# Patient Record
Sex: MALE | Race: WHITE | Employment: OTHER | URBAN - METROPOLITAN AREA
[De-identification: names, ages, dates, MRNs, and addresses within clinical notes are randomized per-mention and may not be internally consistent; named-entity substitution may affect disease eponyms.]

---

## 2017-05-08 ENCOUNTER — OFFICE VISIT (OUTPATIENT)
Dept: FAMILY MEDICINE CLINIC | Age: 67
End: 2017-05-08
Payer: MEDICARE

## 2017-05-08 ENCOUNTER — HOSPITAL ENCOUNTER (OUTPATIENT)
Age: 67
Setting detail: SPECIMEN
Discharge: HOME | End: 2017-05-08
Payer: MEDICARE

## 2017-05-08 VITALS
HEART RATE: 85 BPM | DIASTOLIC BLOOD PRESSURE: 82 MMHG | TEMPERATURE: 98.1 F | BODY MASS INDEX: 31.95 KG/M2 | SYSTOLIC BLOOD PRESSURE: 141 MMHG | WEIGHT: 192 LBS

## 2017-05-08 DIAGNOSIS — Z23 NEED FOR PNEUMOCOCCAL VACCINATION: ICD-10-CM

## 2017-05-08 DIAGNOSIS — E61.1 IRON DEFICIENCY: ICD-10-CM

## 2017-05-08 DIAGNOSIS — I10 ESSENTIAL HYPERTENSION: ICD-10-CM

## 2017-05-08 DIAGNOSIS — Z00.00 WELL ADULT EXAM: ICD-10-CM

## 2017-05-08 DIAGNOSIS — E03.9 HYPOTHYROIDISM, UNSPECIFIED TYPE: ICD-10-CM

## 2017-05-08 DIAGNOSIS — I10 ESSENTIAL HYPERTENSION: Primary | ICD-10-CM

## 2017-05-08 LAB
-: NORMAL
ABSOLUTE EOS #: 0.03 K/UL (ref 0–0.4)
ABSOLUTE LYMPH #: 1.56 K/UL (ref 1–4.8)
ABSOLUTE MONO #: 0.81 K/UL (ref 0.1–0.8)
ALBUMIN SERPL-MCNC: 3.8 G/DL (ref 3.5–5.2)
ALBUMIN/GLOBULIN RATIO: 1 (ref 1–2.5)
ALP BLD-CCNC: 88 U/L (ref 40–129)
ALT SERPL-CCNC: 13 U/L (ref 5–41)
AMORPHOUS: NORMAL
ANION GAP SERPL CALCULATED.3IONS-SCNC: 14 MMOL/L (ref 9–17)
AST SERPL-CCNC: 15 U/L
BACTERIA: NORMAL
BASOPHILS # BLD: 0 %
BASOPHILS ABSOLUTE: 0 K/UL (ref 0–0.2)
BILIRUB SERPL-MCNC: 0.49 MG/DL (ref 0.3–1.2)
BILIRUBIN URINE: NEGATIVE
BUN BLDV-MCNC: 16 MG/DL (ref 8–23)
BUN/CREAT BLD: ABNORMAL (ref 9–20)
CALCIUM SERPL-MCNC: 9.1 MG/DL (ref 8.6–10.4)
CASTS UA: NORMAL /LPF (ref 0–2)
CHLORIDE BLD-SCNC: 97 MMOL/L (ref 98–107)
CHOLESTEROL/HDL RATIO: 3.4
CHOLESTEROL: 128 MG/DL
CO2: 26 MMOL/L (ref 20–31)
COLOR: YELLOW
COMMENT UA: ABNORMAL
CREAT SERPL-MCNC: 0.74 MG/DL (ref 0.7–1.2)
CRYSTALS, UA: NORMAL /HPF
DIFFERENTIAL TYPE: ABNORMAL
EOSINOPHILS RELATIVE PERCENT: 1 %
EPITHELIAL CELLS UA: NORMAL /HPF (ref 0–5)
GFR AFRICAN AMERICAN: >60 ML/MIN
GFR NON-AFRICAN AMERICAN: >60 ML/MIN
GFR SERPL CREATININE-BSD FRML MDRD: ABNORMAL ML/MIN/{1.73_M2}
GFR SERPL CREATININE-BSD FRML MDRD: ABNORMAL ML/MIN/{1.73_M2}
GLUCOSE BLD-MCNC: 163 MG/DL (ref 70–99)
GLUCOSE URINE: NEGATIVE
HCT VFR BLD CALC: 37.5 % (ref 41–53)
HDLC SERPL-MCNC: 38 MG/DL
HEMOGLOBIN: 13 G/DL (ref 13.5–17.5)
KETONES, URINE: NEGATIVE
LDL CHOLESTEROL: 65 MG/DL (ref 0–130)
LEUKOCYTE ESTERASE, URINE: NEGATIVE
LYMPHOCYTES # BLD: 52 %
MCH RBC QN AUTO: 27.6 PG (ref 26–34)
MCHC RBC AUTO-ENTMCNC: 34.7 G/DL (ref 31–37)
MCV RBC AUTO: 79.5 FL (ref 80–100)
MONOCYTES # BLD: 27 %
MORPHOLOGY: ABNORMAL
MUCUS: NORMAL
NITRITE, URINE: NEGATIVE
OTHER OBSERVATIONS UA: NORMAL
PDW BLD-RTO: 17.1 % (ref 12.5–15.4)
PH UA: 7 (ref 5–8)
PLATELET # BLD: 169 K/UL (ref 140–450)
PLATELET ESTIMATE: ABNORMAL
PMV BLD AUTO: 9.2 FL (ref 6–12)
POTASSIUM SERPL-SCNC: 4.1 MMOL/L (ref 3.7–5.3)
PROSTATE SPECIFIC ANTIGEN: 1.37 UG/L
PROTEIN UA: ABNORMAL
RBC # BLD: 4.71 M/UL (ref 4.5–5.9)
RBC # BLD: ABNORMAL 10*6/UL
RBC UA: NORMAL /HPF (ref 0–2)
RENAL EPITHELIAL, UA: NORMAL /HPF
SEG NEUTROPHILS: 20 %
SEGMENTED NEUTROPHILS ABSOLUTE COUNT: 0.6 K/UL (ref 1.8–7.7)
SODIUM BLD-SCNC: 137 MMOL/L (ref 135–144)
SPECIFIC GRAVITY UA: <1.005 (ref 1–1.03)
THYROXINE, FREE: 1.61 NG/DL (ref 0.93–1.7)
TOTAL PROTEIN: 7.8 G/DL (ref 6.4–8.3)
TRICHOMONAS: NORMAL
TRIGL SERPL-MCNC: 125 MG/DL
TSH SERPL DL<=0.05 MIU/L-ACNC: 3.79 MIU/L (ref 0.3–5)
TURBIDITY: CLEAR
URINE HGB: NEGATIVE
UROBILINOGEN, URINE: NORMAL
VLDLC SERPL CALC-MCNC: ABNORMAL MG/DL (ref 1–30)
WBC # BLD: 3 K/UL (ref 3.5–11)
WBC # BLD: ABNORMAL 10*3/UL
WBC UA: NORMAL /HPF (ref 0–5)
YEAST: NORMAL

## 2017-05-08 PROCEDURE — 4040F PNEUMOC VAC/ADMIN/RCVD: CPT | Performed by: FAMILY MEDICINE

## 2017-05-08 PROCEDURE — G0009 ADMIN PNEUMOCOCCAL VACCINE: HCPCS | Performed by: FAMILY MEDICINE

## 2017-05-08 PROCEDURE — G8427 DOCREV CUR MEDS BY ELIG CLIN: HCPCS | Performed by: FAMILY MEDICINE

## 2017-05-08 PROCEDURE — 90670 PCV13 VACCINE IM: CPT | Performed by: FAMILY MEDICINE

## 2017-05-08 PROCEDURE — 4004F PT TOBACCO SCREEN RCVD TLK: CPT | Performed by: FAMILY MEDICINE

## 2017-05-08 PROCEDURE — 1123F ACP DISCUSS/DSCN MKR DOCD: CPT | Performed by: FAMILY MEDICINE

## 2017-05-08 PROCEDURE — 3017F COLORECTAL CA SCREEN DOC REV: CPT | Performed by: FAMILY MEDICINE

## 2017-05-08 PROCEDURE — G8417 CALC BMI ABV UP PARAM F/U: HCPCS | Performed by: FAMILY MEDICINE

## 2017-05-08 PROCEDURE — 99213 OFFICE O/P EST LOW 20 MIN: CPT | Performed by: FAMILY MEDICINE

## 2017-05-08 RX ORDER — ENALAPRIL MALEATE 20 MG/1
TABLET ORAL
Qty: 180 TABLET | Refills: 1 | Status: SHIPPED | OUTPATIENT
Start: 2017-05-08 | End: 2018-02-26 | Stop reason: SDUPTHER

## 2017-05-08 RX ORDER — HYDROCHLOROTHIAZIDE 25 MG/1
TABLET ORAL
Qty: 90 TABLET | Refills: 1 | Status: SHIPPED | OUTPATIENT
Start: 2017-05-08 | End: 2018-02-26 | Stop reason: SDUPTHER

## 2017-05-08 RX ORDER — LEVOTHYROXINE SODIUM 0.15 MG/1
TABLET ORAL
Qty: 100 TABLET | Refills: 1 | Status: SHIPPED | OUTPATIENT
Start: 2017-05-08 | End: 2018-02-26 | Stop reason: SDUPTHER

## 2017-05-09 DIAGNOSIS — D12.6 TUBULAR ADENOMA OF COLON: ICD-10-CM

## 2017-05-09 DIAGNOSIS — D64.9 ANEMIA, UNSPECIFIED TYPE: Primary | ICD-10-CM

## 2017-05-09 DIAGNOSIS — R73.9 HYPERGLYCEMIA: ICD-10-CM

## 2017-05-10 ENCOUNTER — HOSPITAL ENCOUNTER (OUTPATIENT)
Age: 67
Setting detail: SPECIMEN
Discharge: HOME | End: 2017-05-10
Payer: MEDICARE

## 2017-05-10 DIAGNOSIS — R73.9 HYPERGLYCEMIA: ICD-10-CM

## 2017-05-10 LAB
ESTIMATED AVERAGE GLUCOSE: 128 MG/DL
GLUCOSE BLD-MCNC: 172 MG/DL (ref 70–99)
HBA1C MFR BLD: 6.1 % (ref 4–6)

## 2017-05-11 RX ORDER — BLOOD-GLUCOSE METER
KIT MISCELLANEOUS
Qty: 1 KIT | Refills: 0 | Status: SHIPPED | OUTPATIENT
Start: 2017-05-11 | End: 2018-06-26 | Stop reason: ALTCHOICE

## 2017-05-11 RX ORDER — GLUCOSAMINE HCL/CHONDROITIN SU 500-400 MG
CAPSULE ORAL
Qty: 100 STRIP | Refills: 5 | Status: SHIPPED | OUTPATIENT
Start: 2017-05-11 | End: 2017-06-12 | Stop reason: SDUPTHER

## 2017-05-11 RX ORDER — LANCETS 30 GAUGE
EACH MISCELLANEOUS
Qty: 100 EACH | Refills: 5 | Status: SHIPPED | OUTPATIENT
Start: 2017-05-11 | End: 2018-05-29 | Stop reason: SDUPTHER

## 2017-05-13 ASSESSMENT — ENCOUNTER SYMPTOMS
DIARRHEA: 0
VOMITING: 0
WHEEZING: 0
TROUBLE SWALLOWING: 0
SHORTNESS OF BREATH: 0
SORE THROAT: 0
ABDOMINAL PAIN: 0

## 2017-06-05 ENCOUNTER — HOSPITAL ENCOUNTER (OUTPATIENT)
Age: 67
Discharge: HOME | End: 2017-06-05
Payer: MEDICARE

## 2017-06-05 ENCOUNTER — OFFICE VISIT (OUTPATIENT)
Dept: GASTROENTEROLOGY | Age: 67
End: 2017-06-05
Payer: MEDICARE

## 2017-06-05 VITALS
BODY MASS INDEX: 30.12 KG/M2 | WEIGHT: 180.8 LBS | HEIGHT: 65 IN | TEMPERATURE: 99.3 F | HEART RATE: 83 BPM | DIASTOLIC BLOOD PRESSURE: 78 MMHG | SYSTOLIC BLOOD PRESSURE: 132 MMHG | OXYGEN SATURATION: 98 %

## 2017-06-05 DIAGNOSIS — E61.1 IRON DEFICIENCY: Primary | ICD-10-CM

## 2017-06-05 DIAGNOSIS — E61.1 IRON DEFICIENCY: ICD-10-CM

## 2017-06-05 DIAGNOSIS — Z86.010 HISTORY OF COLON POLYPS: ICD-10-CM

## 2017-06-05 LAB
ABSOLUTE RETIC #: 0.12 M/UL (ref 0.02–0.1)
IRON: 42 UG/DL (ref 59–158)
RETIC %: 2.5 % (ref 0.5–2)

## 2017-06-05 PROCEDURE — G8417 CALC BMI ABV UP PARAM F/U: HCPCS | Performed by: INTERNAL MEDICINE

## 2017-06-05 PROCEDURE — 36415 COLL VENOUS BLD VENIPUNCTURE: CPT

## 2017-06-05 PROCEDURE — G8427 DOCREV CUR MEDS BY ELIG CLIN: HCPCS | Performed by: INTERNAL MEDICINE

## 2017-06-05 PROCEDURE — 4040F PNEUMOC VAC/ADMIN/RCVD: CPT | Performed by: INTERNAL MEDICINE

## 2017-06-05 PROCEDURE — 3017F COLORECTAL CA SCREEN DOC REV: CPT | Performed by: INTERNAL MEDICINE

## 2017-06-05 PROCEDURE — 99214 OFFICE O/P EST MOD 30 MIN: CPT | Performed by: INTERNAL MEDICINE

## 2017-06-05 PROCEDURE — 85045 AUTOMATED RETICULOCYTE COUNT: CPT

## 2017-06-05 PROCEDURE — 83540 ASSAY OF IRON: CPT

## 2017-06-05 PROCEDURE — 83516 IMMUNOASSAY NONANTIBODY: CPT

## 2017-06-05 PROCEDURE — 4004F PT TOBACCO SCREEN RCVD TLK: CPT | Performed by: INTERNAL MEDICINE

## 2017-06-05 ASSESSMENT — ENCOUNTER SYMPTOMS
FACIAL SWELLING: 0
CONSTIPATION: 0
VOMITING: 0
RHINORRHEA: 0
COUGH: 0
WHEEZING: 0
RECTAL PAIN: 0
DIARRHEA: 0
BACK PAIN: 0
SORE THROAT: 0
ANAL BLEEDING: 0
VOICE CHANGE: 0
ALLERGIC/IMMUNOLOGIC NEGATIVE: 1
BLOOD IN STOOL: 0
NAUSEA: 0
SINUS PRESSURE: 0
RESPIRATORY NEGATIVE: 1
GASTROINTESTINAL NEGATIVE: 1
ABDOMINAL PAIN: 0
ABDOMINAL DISTENTION: 0
TROUBLE SWALLOWING: 0
SHORTNESS OF BREATH: 0

## 2017-06-06 LAB — TISSUE TRANSGLUTAMINASE IGA: 0.6 U/ML

## 2017-06-12 ENCOUNTER — OFFICE VISIT (OUTPATIENT)
Dept: FAMILY MEDICINE CLINIC | Age: 67
End: 2017-06-12
Payer: MEDICARE

## 2017-06-12 VITALS
RESPIRATION RATE: 18 BRPM | SYSTOLIC BLOOD PRESSURE: 132 MMHG | HEART RATE: 76 BPM | TEMPERATURE: 98.6 F | BODY MASS INDEX: 29.99 KG/M2 | WEIGHT: 180 LBS | HEIGHT: 65 IN | OXYGEN SATURATION: 98 % | DIASTOLIC BLOOD PRESSURE: 64 MMHG

## 2017-06-12 DIAGNOSIS — R05.9 COUGH: ICD-10-CM

## 2017-06-12 DIAGNOSIS — E03.9 HYPOTHYROIDISM, UNSPECIFIED TYPE: ICD-10-CM

## 2017-06-12 DIAGNOSIS — E11.9 TYPE 2 DIABETES MELLITUS WITHOUT COMPLICATION, WITHOUT LONG-TERM CURRENT USE OF INSULIN (HCC): Primary | ICD-10-CM

## 2017-06-12 DIAGNOSIS — I10 ESSENTIAL HYPERTENSION: ICD-10-CM

## 2017-06-12 PROCEDURE — 3046F HEMOGLOBIN A1C LEVEL >9.0%: CPT | Performed by: FAMILY MEDICINE

## 2017-06-12 PROCEDURE — 4004F PT TOBACCO SCREEN RCVD TLK: CPT | Performed by: FAMILY MEDICINE

## 2017-06-12 PROCEDURE — 99213 OFFICE O/P EST LOW 20 MIN: CPT | Performed by: FAMILY MEDICINE

## 2017-06-12 PROCEDURE — 3017F COLORECTAL CA SCREEN DOC REV: CPT | Performed by: FAMILY MEDICINE

## 2017-06-12 PROCEDURE — G8417 CALC BMI ABV UP PARAM F/U: HCPCS | Performed by: FAMILY MEDICINE

## 2017-06-12 PROCEDURE — 4040F PNEUMOC VAC/ADMIN/RCVD: CPT | Performed by: FAMILY MEDICINE

## 2017-06-12 PROCEDURE — G8427 DOCREV CUR MEDS BY ELIG CLIN: HCPCS | Performed by: FAMILY MEDICINE

## 2017-06-12 PROCEDURE — 1123F ACP DISCUSS/DSCN MKR DOCD: CPT | Performed by: FAMILY MEDICINE

## 2017-06-12 RX ORDER — FERROUS SULFATE 325(65) MG
325 TABLET ORAL
COMMUNITY
End: 2019-02-25

## 2017-06-12 RX ORDER — GLUCOSAMINE HCL/CHONDROITIN SU 500-400 MG
CAPSULE ORAL
Qty: 100 STRIP | Refills: 2 | Status: SHIPPED | OUTPATIENT
Start: 2017-06-12 | End: 2018-05-29 | Stop reason: SDUPTHER

## 2017-06-12 RX ORDER — CLOBETASOL PROPIONATE 0.5 MG/G
EMULSION TOPICAL
COMMUNITY
Start: 2017-03-23 | End: 2018-07-05

## 2017-06-12 ASSESSMENT — ENCOUNTER SYMPTOMS
COUGH: 1
ABDOMINAL PAIN: 0
NAUSEA: 0
SHORTNESS OF BREATH: 0
CONSTIPATION: 0
SORE THROAT: 0

## 2017-06-12 ASSESSMENT — PATIENT HEALTH QUESTIONNAIRE - PHQ9
SUM OF ALL RESPONSES TO PHQ QUESTIONS 1-9: 0
SUM OF ALL RESPONSES TO PHQ9 QUESTIONS 1 & 2: 0
2. FEELING DOWN, DEPRESSED OR HOPELESS: 0
1. LITTLE INTEREST OR PLEASURE IN DOING THINGS: 0

## 2017-06-15 ENCOUNTER — TELEPHONE (OUTPATIENT)
Dept: FAMILY MEDICINE CLINIC | Age: 67
End: 2017-06-15

## 2017-07-11 PROBLEM — D17.5 PROMINENT ILEOCECAL VALVE: Status: ACTIVE | Noted: 2017-07-03

## 2017-07-17 ENCOUNTER — OFFICE VISIT (OUTPATIENT)
Dept: FAMILY MEDICINE CLINIC | Age: 67
End: 2017-07-17
Payer: MEDICARE

## 2017-07-17 VITALS
WEIGHT: 169 LBS | TEMPERATURE: 98.3 F | RESPIRATION RATE: 16 BRPM | OXYGEN SATURATION: 98 % | HEIGHT: 65 IN | DIASTOLIC BLOOD PRESSURE: 78 MMHG | HEART RATE: 97 BPM | BODY MASS INDEX: 28.16 KG/M2 | SYSTOLIC BLOOD PRESSURE: 128 MMHG

## 2017-07-17 DIAGNOSIS — E11.9 TYPE 2 DIABETES MELLITUS WITHOUT COMPLICATION, WITHOUT LONG-TERM CURRENT USE OF INSULIN (HCC): ICD-10-CM

## 2017-07-17 DIAGNOSIS — J02.9 PHARYNGITIS, UNSPECIFIED ETIOLOGY: Primary | ICD-10-CM

## 2017-07-17 PROCEDURE — G8417 CALC BMI ABV UP PARAM F/U: HCPCS | Performed by: FAMILY MEDICINE

## 2017-07-17 PROCEDURE — 1123F ACP DISCUSS/DSCN MKR DOCD: CPT | Performed by: FAMILY MEDICINE

## 2017-07-17 PROCEDURE — 99213 OFFICE O/P EST LOW 20 MIN: CPT | Performed by: FAMILY MEDICINE

## 2017-07-17 PROCEDURE — G8427 DOCREV CUR MEDS BY ELIG CLIN: HCPCS | Performed by: FAMILY MEDICINE

## 2017-07-17 PROCEDURE — 4004F PT TOBACCO SCREEN RCVD TLK: CPT | Performed by: FAMILY MEDICINE

## 2017-07-17 PROCEDURE — 4040F PNEUMOC VAC/ADMIN/RCVD: CPT | Performed by: FAMILY MEDICINE

## 2017-07-17 PROCEDURE — 3046F HEMOGLOBIN A1C LEVEL >9.0%: CPT | Performed by: FAMILY MEDICINE

## 2017-07-17 PROCEDURE — 3017F COLORECTAL CA SCREEN DOC REV: CPT | Performed by: FAMILY MEDICINE

## 2017-07-17 RX ORDER — AMOXICILLIN 500 MG/1
500 CAPSULE ORAL 3 TIMES DAILY
Qty: 30 CAPSULE | Refills: 0 | Status: SHIPPED | OUTPATIENT
Start: 2017-07-17 | End: 2017-07-27

## 2017-07-17 ASSESSMENT — ENCOUNTER SYMPTOMS
ABDOMINAL PAIN: 0
NAUSEA: 0
SORE THROAT: 0
CONSTIPATION: 0
SHORTNESS OF BREATH: 0

## 2017-08-29 ENCOUNTER — TELEPHONE (OUTPATIENT)
Dept: GASTROENTEROLOGY | Age: 67
End: 2017-08-29

## 2017-09-07 PROBLEM — K63.5 COLON POLYPS: Status: ACTIVE | Noted: 2017-07-03

## 2017-09-11 ENCOUNTER — OFFICE VISIT (OUTPATIENT)
Dept: GASTROENTEROLOGY | Age: 67
End: 2017-09-11
Payer: MEDICARE

## 2017-09-11 VITALS
BODY MASS INDEX: 27.32 KG/M2 | DIASTOLIC BLOOD PRESSURE: 78 MMHG | OXYGEN SATURATION: 98 % | HEIGHT: 65 IN | SYSTOLIC BLOOD PRESSURE: 114 MMHG | TEMPERATURE: 98.9 F | HEART RATE: 85 BPM | WEIGHT: 164 LBS

## 2017-09-11 DIAGNOSIS — E61.1 IRON DEFICIENCY: ICD-10-CM

## 2017-09-11 DIAGNOSIS — Z86.010 HISTORY OF COLON POLYPS: ICD-10-CM

## 2017-09-11 DIAGNOSIS — K63.5 POLYP OF TRANSVERSE COLON, UNSPECIFIED TYPE: Primary | ICD-10-CM

## 2017-09-11 PROCEDURE — 3017F COLORECTAL CA SCREEN DOC REV: CPT | Performed by: INTERNAL MEDICINE

## 2017-09-11 PROCEDURE — 1123F ACP DISCUSS/DSCN MKR DOCD: CPT | Performed by: INTERNAL MEDICINE

## 2017-09-11 PROCEDURE — 99213 OFFICE O/P EST LOW 20 MIN: CPT | Performed by: INTERNAL MEDICINE

## 2017-09-11 PROCEDURE — 4040F PNEUMOC VAC/ADMIN/RCVD: CPT | Performed by: INTERNAL MEDICINE

## 2017-09-11 PROCEDURE — 4004F PT TOBACCO SCREEN RCVD TLK: CPT | Performed by: INTERNAL MEDICINE

## 2017-09-11 PROCEDURE — G8417 CALC BMI ABV UP PARAM F/U: HCPCS | Performed by: INTERNAL MEDICINE

## 2017-09-11 PROCEDURE — G8427 DOCREV CUR MEDS BY ELIG CLIN: HCPCS | Performed by: INTERNAL MEDICINE

## 2017-09-11 ASSESSMENT — ENCOUNTER SYMPTOMS
ABDOMINAL DISTENTION: 0
VOICE CHANGE: 0
SHORTNESS OF BREATH: 0
NAUSEA: 0
FACIAL SWELLING: 0
DIARRHEA: 0
TROUBLE SWALLOWING: 0
SORE THROAT: 0
BACK PAIN: 0
CONSTIPATION: 0
RECTAL PAIN: 0
SINUS PRESSURE: 0
ABDOMINAL PAIN: 0
RESPIRATORY NEGATIVE: 1
VOMITING: 0
BLOOD IN STOOL: 0
GASTROINTESTINAL NEGATIVE: 1
RHINORRHEA: 0
ANAL BLEEDING: 0
WHEEZING: 0
COUGH: 0
ALLERGIC/IMMUNOLOGIC NEGATIVE: 1

## 2017-10-23 ENCOUNTER — OFFICE VISIT (OUTPATIENT)
Dept: FAMILY MEDICINE CLINIC | Age: 67
End: 2017-10-23
Payer: MEDICARE

## 2017-10-23 ENCOUNTER — HOSPITAL ENCOUNTER (OUTPATIENT)
Age: 67
Setting detail: SPECIMEN
Discharge: HOME | End: 2017-10-23
Payer: MEDICARE

## 2017-10-23 VITALS
OXYGEN SATURATION: 98 % | WEIGHT: 161.4 LBS | TEMPERATURE: 98.4 F | HEART RATE: 82 BPM | SYSTOLIC BLOOD PRESSURE: 138 MMHG | BODY MASS INDEX: 26.89 KG/M2 | DIASTOLIC BLOOD PRESSURE: 78 MMHG | HEIGHT: 65 IN

## 2017-10-23 DIAGNOSIS — Z23 NEED FOR INFLUENZA VACCINATION: ICD-10-CM

## 2017-10-23 DIAGNOSIS — E11.9 TYPE 2 DIABETES MELLITUS WITHOUT COMPLICATION, WITHOUT LONG-TERM CURRENT USE OF INSULIN (HCC): ICD-10-CM

## 2017-10-23 DIAGNOSIS — E11.9 TYPE 2 DIABETES MELLITUS WITHOUT COMPLICATION, WITHOUT LONG-TERM CURRENT USE OF INSULIN (HCC): Primary | ICD-10-CM

## 2017-10-23 DIAGNOSIS — I10 ESSENTIAL HYPERTENSION: ICD-10-CM

## 2017-10-23 DIAGNOSIS — E03.9 HYPOTHYROIDISM, UNSPECIFIED TYPE: ICD-10-CM

## 2017-10-23 DIAGNOSIS — E61.1 IRON DEFICIENCY: ICD-10-CM

## 2017-10-23 LAB
CREATININE URINE: 40 MG/DL (ref 39–259)
HBA1C MFR BLD: 4.8 %
MICROALBUMIN/CREAT 24H UR: <12 MG/L
MICROALBUMIN/CREAT UR-RTO: 30 MCG/MG CREAT

## 2017-10-23 PROCEDURE — 99214 OFFICE O/P EST MOD 30 MIN: CPT | Performed by: FAMILY MEDICINE

## 2017-10-23 PROCEDURE — 83036 HEMOGLOBIN GLYCOSYLATED A1C: CPT | Performed by: FAMILY MEDICINE

## 2017-10-23 PROCEDURE — 1123F ACP DISCUSS/DSCN MKR DOCD: CPT | Performed by: FAMILY MEDICINE

## 2017-10-23 PROCEDURE — 3017F COLORECTAL CA SCREEN DOC REV: CPT | Performed by: FAMILY MEDICINE

## 2017-10-23 PROCEDURE — 3044F HG A1C LEVEL LT 7.0%: CPT | Performed by: FAMILY MEDICINE

## 2017-10-23 PROCEDURE — G0008 ADMIN INFLUENZA VIRUS VAC: HCPCS | Performed by: FAMILY MEDICINE

## 2017-10-23 PROCEDURE — G8417 CALC BMI ABV UP PARAM F/U: HCPCS | Performed by: FAMILY MEDICINE

## 2017-10-23 PROCEDURE — 4040F PNEUMOC VAC/ADMIN/RCVD: CPT | Performed by: FAMILY MEDICINE

## 2017-10-23 PROCEDURE — 4004F PT TOBACCO SCREEN RCVD TLK: CPT | Performed by: FAMILY MEDICINE

## 2017-10-23 PROCEDURE — 90688 IIV4 VACCINE SPLT 0.5 ML IM: CPT | Performed by: FAMILY MEDICINE

## 2017-10-23 PROCEDURE — G8484 FLU IMMUNIZE NO ADMIN: HCPCS | Performed by: FAMILY MEDICINE

## 2017-10-23 PROCEDURE — G8427 DOCREV CUR MEDS BY ELIG CLIN: HCPCS | Performed by: FAMILY MEDICINE

## 2017-10-23 RX ORDER — ATORVASTATIN CALCIUM 10 MG/1
10 TABLET, FILM COATED ORAL DAILY
Qty: 30 TABLET | Refills: 3 | Status: SHIPPED | OUTPATIENT
Start: 2017-10-23 | End: 2018-02-26 | Stop reason: SDUPTHER

## 2017-10-23 ASSESSMENT — ENCOUNTER SYMPTOMS
NAUSEA: 0
BACK PAIN: 0
ABDOMINAL PAIN: 0
SHORTNESS OF BREATH: 0
COUGH: 0
SORE THROAT: 0
DIARRHEA: 0
CONSTIPATION: 0

## 2017-10-23 NOTE — PROGRESS NOTES
 Lipid screen  05/08/2018    Pneumococcal low/med risk (2 of 2 - PPSV23) 05/08/2018    Diabetic hemoglobin A1C test  05/10/2018    Colon cancer screen colonoscopy  07/03/2020    Zostavax vaccine  Addressed    AAA screen  Completed    Hepatitis C screen  Addressed     HPI  50-year-old male is seen in the Office. today for follow-up for his diabetes and hypertension his blood sugars are running between 101 and1 20 he is on metformin he also watches his diet and has lost weight. I also told him being a diabetic  going to start him on statin, his blood pressure is controlled he is on Vasotec and is on Synthroid for his hypothyroidism denies of any chest pain or shortness of  breath patient states he is following his diet  Review of Systems   Constitutional: Negative for appetite change and unexpected weight change. HENT: Negative for ear pain, postnasal drip, sneezing and sore throat. Eyes: Negative for visual disturbance. Respiratory: Negative for cough and shortness of breath. Cardiovascular: Negative for chest pain and leg swelling. Gastrointestinal: Negative for abdominal pain, constipation, diarrhea and nausea. Endocrine: Negative for polydipsia and polyuria. Genitourinary: Negative for frequency and urgency. Musculoskeletal: Negative for arthralgias and back pain. Neurological: Negative for dizziness, syncope and headaches. Objective:   Physical Exam   Constitutional: He is oriented to person, place, and time. He appears well-developed and well-nourished. /78   Pulse 82   Temp 98.4 °F (36.9 °C) (Oral)   Ht 5' 5\" (1.651 m)   Wt 161 lb 6.4 oz (73.2 kg)   SpO2 98%   BMI 26.86 kg/m²    HENT:   Head: Normocephalic. Mouth/Throat: Oropharynx is clear and moist.   Eyes: Conjunctivae are normal.   Cardiovascular: Normal rate and regular rhythm. Pulmonary/Chest: Breath sounds normal. He has no rales. Abdominal: Soft. There is no tenderness.    Musculoskeletal: He exhibits no edema. No pedal edema pedal pulses are palpable monofilament test is normal   Lymphadenopathy:     He has no cervical adenopathy. Neurological: He is alert and oriented to person, place, and time. Nursing note and vitals reviewed. hemoglobin A1c today was 4.8    Assessment:      1. Type 2 diabetes mellitus without complication, without long-term current use of insulin (HCC)  Controlled  POCT glycosylated hemoglobin (Hb A1C)    Microalbumin, Ur     DIABETES FOOT EXAM   2. Need for influenza vaccination  INFLUENZA, QUADV, 6-35 MO, IM, MDV, 0.25ML (Shoaib Becki)   3. Essential hypertension  Controlled  Hepatic Function Panel   4. Iron deficiency  On iron    5. Hypothyroidism, unspecified type  Controlled            Plan:        Orders Placed This Encounter   Procedures    INFLUENZA, QUADV, 6-35 MO, IM, MDV, 0.25ML (FLUZONE QUADV)    Microalbumin, Ur     Standing Status:   Future     Standing Expiration Date:   10/23/2018    Hepatic Function Panel     Standing Status:   Future     Standing Expiration Date:   10/23/2018    POCT glycosylated hemoglobin (Hb A1C)     DIABETES FOOT EXAM     Orders Placed This Encounter   Medications    atorvastatin (LIPITOR) 10 MG tablet     Sig: Take 1 tablet by mouth daily     Dispense:  30 tablet     Refill:  3     Return in about 4 months (around 2/23/2018) for diabetes.     Continue current medications reviewed from the chart     needs to see an ophthalmologist for his eye exam

## 2017-11-15 ENCOUNTER — HOSPITAL ENCOUNTER (OUTPATIENT)
Age: 67
Discharge: HOME | End: 2017-11-15
Payer: MEDICARE

## 2017-11-15 DIAGNOSIS — I10 ESSENTIAL HYPERTENSION: ICD-10-CM

## 2017-11-15 LAB
ALBUMIN SERPL-MCNC: 3.8 G/DL (ref 3.5–5.2)
ALBUMIN/GLOBULIN RATIO: NORMAL (ref 1–2.5)
ALP BLD-CCNC: 99 U/L (ref 40–129)
ALT SERPL-CCNC: 22 U/L (ref 5–41)
AST SERPL-CCNC: 22 U/L
BILIRUB SERPL-MCNC: 0.44 MG/DL (ref 0.3–1.2)
BILIRUBIN DIRECT: 0.13 MG/DL
BILIRUBIN, INDIRECT: 0.31 MG/DL (ref 0–1)
GLOBULIN: NORMAL G/DL (ref 1.5–3.8)
TOTAL PROTEIN: 8.3 G/DL (ref 6.4–8.3)

## 2017-11-15 PROCEDURE — 36415 COLL VENOUS BLD VENIPUNCTURE: CPT

## 2017-11-15 PROCEDURE — 80076 HEPATIC FUNCTION PANEL: CPT

## 2018-02-26 ENCOUNTER — OFFICE VISIT (OUTPATIENT)
Dept: FAMILY MEDICINE CLINIC | Age: 68
End: 2018-02-26
Payer: MEDICARE

## 2018-02-26 VITALS
TEMPERATURE: 97.6 F | WEIGHT: 171.2 LBS | OXYGEN SATURATION: 97 % | DIASTOLIC BLOOD PRESSURE: 78 MMHG | SYSTOLIC BLOOD PRESSURE: 128 MMHG | HEART RATE: 78 BPM | BODY MASS INDEX: 28.49 KG/M2

## 2018-02-26 DIAGNOSIS — I10 ESSENTIAL HYPERTENSION: ICD-10-CM

## 2018-02-26 DIAGNOSIS — E03.9 HYPOTHYROIDISM, UNSPECIFIED TYPE: ICD-10-CM

## 2018-02-26 DIAGNOSIS — E11.9 TYPE 2 DIABETES MELLITUS WITHOUT COMPLICATION, WITHOUT LONG-TERM CURRENT USE OF INSULIN (HCC): Primary | ICD-10-CM

## 2018-02-26 PROCEDURE — 4040F PNEUMOC VAC/ADMIN/RCVD: CPT | Performed by: FAMILY MEDICINE

## 2018-02-26 PROCEDURE — G8484 FLU IMMUNIZE NO ADMIN: HCPCS | Performed by: FAMILY MEDICINE

## 2018-02-26 PROCEDURE — 3046F HEMOGLOBIN A1C LEVEL >9.0%: CPT | Performed by: FAMILY MEDICINE

## 2018-02-26 PROCEDURE — 4004F PT TOBACCO SCREEN RCVD TLK: CPT | Performed by: FAMILY MEDICINE

## 2018-02-26 PROCEDURE — 3017F COLORECTAL CA SCREEN DOC REV: CPT | Performed by: FAMILY MEDICINE

## 2018-02-26 PROCEDURE — 99213 OFFICE O/P EST LOW 20 MIN: CPT | Performed by: FAMILY MEDICINE

## 2018-02-26 PROCEDURE — 1123F ACP DISCUSS/DSCN MKR DOCD: CPT | Performed by: FAMILY MEDICINE

## 2018-02-26 PROCEDURE — G8417 CALC BMI ABV UP PARAM F/U: HCPCS | Performed by: FAMILY MEDICINE

## 2018-02-26 PROCEDURE — G8427 DOCREV CUR MEDS BY ELIG CLIN: HCPCS | Performed by: FAMILY MEDICINE

## 2018-02-26 RX ORDER — HYDROCHLOROTHIAZIDE 25 MG/1
TABLET ORAL
Qty: 90 TABLET | Refills: 1 | Status: SHIPPED | OUTPATIENT
Start: 2018-02-26 | End: 2018-08-28 | Stop reason: SDUPTHER

## 2018-02-26 RX ORDER — ATORVASTATIN CALCIUM 10 MG/1
10 TABLET, FILM COATED ORAL DAILY
Qty: 90 TABLET | Refills: 3 | Status: SHIPPED | OUTPATIENT
Start: 2018-02-26 | End: 2018-08-28 | Stop reason: SDUPTHER

## 2018-02-26 RX ORDER — LEVOTHYROXINE SODIUM 0.15 MG/1
TABLET ORAL
Qty: 120 TABLET | Refills: 1 | Status: SHIPPED | OUTPATIENT
Start: 2018-02-26 | End: 2018-08-28 | Stop reason: SDUPTHER

## 2018-02-26 RX ORDER — ENALAPRIL MALEATE 20 MG/1
TABLET ORAL
Qty: 180 TABLET | Refills: 1 | Status: SHIPPED | OUTPATIENT
Start: 2018-02-26 | End: 2018-08-28 | Stop reason: SDUPTHER

## 2018-02-26 ASSESSMENT — ENCOUNTER SYMPTOMS
COUGH: 0
NAUSEA: 0
DIARRHEA: 0
ABDOMINAL PAIN: 0
SORE THROAT: 0
CONSTIPATION: 0
SHORTNESS OF BREATH: 0

## 2018-02-26 NOTE — PROGRESS NOTES
Subjective:      Patient ID: Sander York is a 78 y.o. male. Diabetes  Visit Information    Have you changed or started any medications since your last visit including any over-the-counter medicines, vitamins, or herbal medicines? no   Have you stopped taking any of your medications? Is so, why? -  no  Are you having any side effects from any of your medications? - no    Have you seen any other physician or provider since your last visit?  no   Have you had any other diagnostic tests since your last visit?  no   Have you been seen in the emergency room and/or had an admission in a hospital since we last saw you?  no   Have you had your routine dental cleaning in the past 6 months?  no     Do you have an active MyChart account? If no, what is the barrier? Yes    Patient Care Team:  Di Lombardo MD as PCP - General (Family Medicine)  iD Lombardo MD as PCP - S Attributed Provider  Paula Moreno MD as Consulting Physician (Gastroenterology)  Tila De La Cruz MD as Consulting Physician (Hematology and Oncology)    Medical History Review  Past Medical, Family, and Social History reviewed and does contribute to the patient presenting condition    Health Maintenance   Topic Date Due    Diabetic retinal exam  08/30/1961    Shingles Vaccine (1 of 2 - 2 Dose Series) 08/30/2001    DTaP/Tdap/Td vaccine (1 - Tdap) 12/05/2026 (Originally 8/30/1970)    Lipid screen  05/08/2018    Pneumococcal low/med risk (2 of 2 - PPSV23) 05/08/2018    TSH testing  05/08/2018    Potassium monitoring  05/08/2018    Creatinine monitoring  05/08/2018    Diabetic foot exam  10/23/2018    A1C test (Diabetic or Prediabetic)  10/23/2018    Diabetic microalbuminuria test  10/23/2018    Colon cancer screen colonoscopy  07/03/2020    Flu vaccine  Completed    AAA screen  Completed    Hepatitis C screen  Addressed             HPI  This 79-year-old male is seen in the Office.  today for follow-up for his  hypertension diabetes

## 2018-04-23 ENCOUNTER — HOSPITAL ENCOUNTER (OUTPATIENT)
Age: 68
Discharge: HOME | End: 2018-04-23
Payer: MEDICARE

## 2018-04-23 DIAGNOSIS — I10 ESSENTIAL HYPERTENSION: ICD-10-CM

## 2018-04-23 DIAGNOSIS — E03.9 HYPOTHYROIDISM, UNSPECIFIED TYPE: ICD-10-CM

## 2018-04-23 LAB
ANION GAP SERPL CALCULATED.3IONS-SCNC: 10 MMOL/L (ref 9–17)
BUN BLDV-MCNC: 20 MG/DL (ref 8–23)
BUN/CREAT BLD: ABNORMAL (ref 9–20)
CALCIUM SERPL-MCNC: 8.8 MG/DL (ref 8.6–10.4)
CHLORIDE BLD-SCNC: 100 MMOL/L (ref 98–107)
CHOLESTEROL/HDL RATIO: 2.5
CHOLESTEROL: 103 MG/DL
CO2: 28 MMOL/L (ref 20–31)
CREAT SERPL-MCNC: 0.82 MG/DL (ref 0.7–1.2)
GFR AFRICAN AMERICAN: >60 ML/MIN
GFR NON-AFRICAN AMERICAN: >60 ML/MIN
GFR SERPL CREATININE-BSD FRML MDRD: ABNORMAL ML/MIN/{1.73_M2}
GFR SERPL CREATININE-BSD FRML MDRD: ABNORMAL ML/MIN/{1.73_M2}
GLUCOSE BLD-MCNC: 114 MG/DL (ref 70–99)
HDLC SERPL-MCNC: 41 MG/DL
LDL CHOLESTEROL: 36 MG/DL (ref 0–130)
POTASSIUM SERPL-SCNC: 4 MMOL/L (ref 3.7–5.3)
SODIUM BLD-SCNC: 138 MMOL/L (ref 135–144)
TRIGL SERPL-MCNC: 132 MG/DL
TSH SERPL DL<=0.05 MIU/L-ACNC: 1.12 MIU/L (ref 0.3–5)
VLDLC SERPL CALC-MCNC: NORMAL MG/DL (ref 1–30)

## 2018-04-23 PROCEDURE — 80061 LIPID PANEL: CPT

## 2018-04-23 PROCEDURE — 36415 COLL VENOUS BLD VENIPUNCTURE: CPT

## 2018-04-23 PROCEDURE — 80048 BASIC METABOLIC PNL TOTAL CA: CPT

## 2018-04-23 PROCEDURE — 84443 ASSAY THYROID STIM HORMONE: CPT

## 2018-05-14 ENCOUNTER — OFFICE VISIT (OUTPATIENT)
Dept: FAMILY MEDICINE CLINIC | Age: 68
End: 2018-05-14
Payer: MEDICARE

## 2018-05-14 VITALS
DIASTOLIC BLOOD PRESSURE: 70 MMHG | OXYGEN SATURATION: 98 % | HEART RATE: 83 BPM | TEMPERATURE: 97.9 F | BODY MASS INDEX: 27.96 KG/M2 | WEIGHT: 168 LBS | SYSTOLIC BLOOD PRESSURE: 130 MMHG

## 2018-05-14 DIAGNOSIS — J06.9 UPPER RESPIRATORY TRACT INFECTION, UNSPECIFIED TYPE: Primary | ICD-10-CM

## 2018-05-14 DIAGNOSIS — D70.4 CYCLICAL NEUTROPENIA (HCC): ICD-10-CM

## 2018-05-14 DIAGNOSIS — E11.9 TYPE 2 DIABETES MELLITUS WITHOUT COMPLICATION, WITHOUT LONG-TERM CURRENT USE OF INSULIN (HCC): ICD-10-CM

## 2018-05-14 PROCEDURE — 3017F COLORECTAL CA SCREEN DOC REV: CPT | Performed by: FAMILY MEDICINE

## 2018-05-14 PROCEDURE — 4040F PNEUMOC VAC/ADMIN/RCVD: CPT | Performed by: FAMILY MEDICINE

## 2018-05-14 PROCEDURE — 2022F DILAT RTA XM EVC RTNOPTHY: CPT | Performed by: FAMILY MEDICINE

## 2018-05-14 PROCEDURE — G8417 CALC BMI ABV UP PARAM F/U: HCPCS | Performed by: FAMILY MEDICINE

## 2018-05-14 PROCEDURE — G8427 DOCREV CUR MEDS BY ELIG CLIN: HCPCS | Performed by: FAMILY MEDICINE

## 2018-05-14 PROCEDURE — 99213 OFFICE O/P EST LOW 20 MIN: CPT | Performed by: FAMILY MEDICINE

## 2018-05-14 PROCEDURE — 1123F ACP DISCUSS/DSCN MKR DOCD: CPT | Performed by: FAMILY MEDICINE

## 2018-05-14 PROCEDURE — 4004F PT TOBACCO SCREEN RCVD TLK: CPT | Performed by: FAMILY MEDICINE

## 2018-05-14 PROCEDURE — 3046F HEMOGLOBIN A1C LEVEL >9.0%: CPT | Performed by: FAMILY MEDICINE

## 2018-05-14 RX ORDER — AMOXICILLIN 500 MG/1
500 CAPSULE ORAL 3 TIMES DAILY
Qty: 30 CAPSULE | Refills: 0 | Status: SHIPPED | OUTPATIENT
Start: 2018-05-14 | End: 2018-05-24

## 2018-05-14 ASSESSMENT — ENCOUNTER SYMPTOMS
COUGH: 1
DIARRHEA: 0
CONSTIPATION: 0
NAUSEA: 0
ABDOMINAL PAIN: 0
WHEEZING: 1
SHORTNESS OF BREATH: 0
SORE THROAT: 1
SINUS PRESSURE: 0

## 2018-05-29 DIAGNOSIS — E11.9 TYPE 2 DIABETES MELLITUS WITHOUT COMPLICATION, WITHOUT LONG-TERM CURRENT USE OF INSULIN (HCC): ICD-10-CM

## 2018-05-29 RX ORDER — GLUCOSAMINE HCL/CHONDROITIN SU 500-400 MG
CAPSULE ORAL
Qty: 100 STRIP | Refills: 0 | Status: SHIPPED | OUTPATIENT
Start: 2018-05-29 | End: 2018-08-06 | Stop reason: SDUPTHER

## 2018-05-29 RX ORDER — LANCETS 30 GAUGE
EACH MISCELLANEOUS
Qty: 100 EACH | Refills: 0 | Status: SHIPPED | OUTPATIENT
Start: 2018-05-29 | End: 2018-08-12 | Stop reason: SDUPTHER

## 2018-05-30 ENCOUNTER — OFFICE VISIT (OUTPATIENT)
Dept: FAMILY MEDICINE CLINIC | Age: 68
End: 2018-05-30
Payer: MEDICARE

## 2018-05-30 ENCOUNTER — TELEPHONE (OUTPATIENT)
Dept: FAMILY MEDICINE CLINIC | Age: 68
End: 2018-05-30

## 2018-05-30 VITALS
OXYGEN SATURATION: 98 % | RESPIRATION RATE: 16 BRPM | DIASTOLIC BLOOD PRESSURE: 78 MMHG | HEART RATE: 76 BPM | WEIGHT: 165 LBS | TEMPERATURE: 98.8 F | HEIGHT: 63 IN | SYSTOLIC BLOOD PRESSURE: 134 MMHG | BODY MASS INDEX: 29.23 KG/M2

## 2018-05-30 DIAGNOSIS — J02.9 SORE THROAT: ICD-10-CM

## 2018-05-30 DIAGNOSIS — J36 PERITONSILLAR ABSCESS: Primary | ICD-10-CM

## 2018-05-30 PROCEDURE — 87880 STREP A ASSAY W/OPTIC: CPT | Performed by: PHYSICIAN ASSISTANT

## 2018-05-30 PROCEDURE — 99214 OFFICE O/P EST MOD 30 MIN: CPT | Performed by: PHYSICIAN ASSISTANT

## 2018-05-30 RX ORDER — PREDNISONE 50 MG/1
50 TABLET ORAL DAILY
Qty: 3 TABLET | Refills: 0 | Status: SHIPPED | OUTPATIENT
Start: 2018-05-30 | End: 2018-06-02

## 2018-05-30 RX ORDER — CLINDAMYCIN PHOSPHATE 150 MG/ML
600 INJECTION, SOLUTION INTRAVENOUS ONCE
Status: COMPLETED | OUTPATIENT
Start: 2018-05-30 | End: 2018-05-30

## 2018-05-30 RX ORDER — CLINDAMYCIN HYDROCHLORIDE 150 MG/1
150 CAPSULE ORAL 3 TIMES DAILY
Qty: 30 CAPSULE | Refills: 0 | Status: SHIPPED | OUTPATIENT
Start: 2018-05-30 | End: 2018-06-09

## 2018-05-30 RX ORDER — IBUPROFEN AND FAMOTIDINE 26.6; 8 MG/1; MG/1
1 TABLET, FILM COATED ORAL 2 TIMES DAILY
Qty: 90 TABLET | Refills: 2 | Status: SHIPPED | OUTPATIENT
Start: 2018-05-30 | End: 2018-08-06

## 2018-05-30 RX ADMIN — CLINDAMYCIN PHOSPHATE 600 MG: 150 INJECTION, SOLUTION INTRAVENOUS at 15:12

## 2018-05-30 ASSESSMENT — ENCOUNTER SYMPTOMS
SORE THROAT: 1
RESPIRATORY NEGATIVE: 1
EYES NEGATIVE: 1

## 2018-06-16 ENCOUNTER — OFFICE VISIT (OUTPATIENT)
Dept: FAMILY MEDICINE CLINIC | Age: 68
End: 2018-06-16
Payer: MEDICARE

## 2018-06-16 VITALS
HEART RATE: 86 BPM | BODY MASS INDEX: 28.34 KG/M2 | DIASTOLIC BLOOD PRESSURE: 72 MMHG | TEMPERATURE: 98 F | RESPIRATION RATE: 16 BRPM | OXYGEN SATURATION: 95 % | HEIGHT: 64 IN | SYSTOLIC BLOOD PRESSURE: 118 MMHG | WEIGHT: 166 LBS

## 2018-06-16 DIAGNOSIS — J02.9 ACUTE PHARYNGITIS, UNSPECIFIED ETIOLOGY: Primary | ICD-10-CM

## 2018-06-16 PROCEDURE — 4040F PNEUMOC VAC/ADMIN/RCVD: CPT | Performed by: INTERNAL MEDICINE

## 2018-06-16 PROCEDURE — G8427 DOCREV CUR MEDS BY ELIG CLIN: HCPCS | Performed by: INTERNAL MEDICINE

## 2018-06-16 PROCEDURE — 3017F COLORECTAL CA SCREEN DOC REV: CPT | Performed by: INTERNAL MEDICINE

## 2018-06-16 PROCEDURE — 4004F PT TOBACCO SCREEN RCVD TLK: CPT | Performed by: INTERNAL MEDICINE

## 2018-06-16 PROCEDURE — G8510 SCR DEP NEG, NO PLAN REQD: HCPCS | Performed by: INTERNAL MEDICINE

## 2018-06-16 PROCEDURE — 1123F ACP DISCUSS/DSCN MKR DOCD: CPT | Performed by: INTERNAL MEDICINE

## 2018-06-16 PROCEDURE — G8417 CALC BMI ABV UP PARAM F/U: HCPCS | Performed by: INTERNAL MEDICINE

## 2018-06-16 PROCEDURE — 99213 OFFICE O/P EST LOW 20 MIN: CPT | Performed by: INTERNAL MEDICINE

## 2018-06-16 RX ORDER — AZITHROMYCIN 250 MG/1
TABLET, FILM COATED ORAL
Qty: 1 PACKET | Refills: 0 | Status: SHIPPED | OUTPATIENT
Start: 2018-06-16 | End: 2018-06-20

## 2018-06-16 ASSESSMENT — ENCOUNTER SYMPTOMS
COUGH: 0
SORE THROAT: 1
SHORTNESS OF BREATH: 0

## 2018-06-16 ASSESSMENT — PATIENT HEALTH QUESTIONNAIRE - PHQ9
1. LITTLE INTEREST OR PLEASURE IN DOING THINGS: 0
SUM OF ALL RESPONSES TO PHQ QUESTIONS 1-9: 0
2. FEELING DOWN, DEPRESSED OR HOPELESS: 0
SUM OF ALL RESPONSES TO PHQ9 QUESTIONS 1 & 2: 0

## 2018-06-18 ENCOUNTER — HOSPITAL ENCOUNTER (OUTPATIENT)
Facility: CLINIC | Age: 68
Discharge: HOME | End: 2018-06-20
Payer: MEDICARE

## 2018-06-18 ENCOUNTER — HOSPITAL ENCOUNTER (OUTPATIENT)
Dept: GENERAL RADIOLOGY | Facility: CLINIC | Age: 68
Discharge: HOME | End: 2018-06-20
Payer: MEDICARE

## 2018-06-18 ENCOUNTER — OFFICE VISIT (OUTPATIENT)
Dept: FAMILY MEDICINE CLINIC | Age: 68
End: 2018-06-18
Payer: MEDICARE

## 2018-06-18 ENCOUNTER — HOSPITAL ENCOUNTER (OUTPATIENT)
Age: 68
Discharge: HOME | End: 2018-06-18
Payer: MEDICARE

## 2018-06-18 VITALS
HEART RATE: 73 BPM | SYSTOLIC BLOOD PRESSURE: 126 MMHG | TEMPERATURE: 98.8 F | RESPIRATION RATE: 16 BRPM | DIASTOLIC BLOOD PRESSURE: 79 MMHG | HEIGHT: 65 IN | WEIGHT: 166 LBS | BODY MASS INDEX: 27.66 KG/M2 | OXYGEN SATURATION: 98 %

## 2018-06-18 DIAGNOSIS — R49.0 HOARSENESS OF VOICE: ICD-10-CM

## 2018-06-18 DIAGNOSIS — R70.0 ELEVATED SEDIMENTATION RATE: ICD-10-CM

## 2018-06-18 DIAGNOSIS — J35.1 ENLARGED TONSILS: ICD-10-CM

## 2018-06-18 DIAGNOSIS — J02.9 PHARYNGITIS, UNSPECIFIED ETIOLOGY: ICD-10-CM

## 2018-06-18 DIAGNOSIS — R79.89 ABNORMAL C-REACTIVE PROTEIN: ICD-10-CM

## 2018-06-18 DIAGNOSIS — J02.9 PHARYNGITIS, UNSPECIFIED ETIOLOGY: Primary | ICD-10-CM

## 2018-06-18 DIAGNOSIS — R79.89 ABNORMAL CBC: ICD-10-CM

## 2018-06-18 LAB
C-REACTIVE PROTEIN: 93 MG/L (ref 0–5)
HCT VFR BLD CALC: 34 % (ref 41–53)
HEMOGLOBIN: 11.3 G/DL (ref 13.5–17.5)
MCH RBC QN AUTO: 27.5 PG (ref 26–34)
MCHC RBC AUTO-ENTMCNC: 33.3 G/DL (ref 31–37)
MCV RBC AUTO: 82.7 FL (ref 80–100)
NRBC AUTOMATED: ABNORMAL PER 100 WBC
PATHOLOGIST REVIEW: NORMAL
PDW BLD-RTO: 15.6 % (ref 11.5–14.9)
PLATELET # BLD: 109 K/UL (ref 150–450)
PMV BLD AUTO: 8.8 FL (ref 6–12)
RBC # BLD: 4.11 M/UL (ref 4.5–5.9)
SEDIMENTATION RATE, ERYTHROCYTE: 60 MM (ref 0–15)
WBC # BLD: 1.8 K/UL (ref 3.5–11)

## 2018-06-18 PROCEDURE — 4040F PNEUMOC VAC/ADMIN/RCVD: CPT | Performed by: PHYSICIAN ASSISTANT

## 2018-06-18 PROCEDURE — 85027 COMPLETE CBC AUTOMATED: CPT

## 2018-06-18 PROCEDURE — 99213 OFFICE O/P EST LOW 20 MIN: CPT | Performed by: PHYSICIAN ASSISTANT

## 2018-06-18 PROCEDURE — 85651 RBC SED RATE NONAUTOMATED: CPT

## 2018-06-18 PROCEDURE — 1123F ACP DISCUSS/DSCN MKR DOCD: CPT | Performed by: PHYSICIAN ASSISTANT

## 2018-06-18 PROCEDURE — 86140 C-REACTIVE PROTEIN: CPT

## 2018-06-18 PROCEDURE — 36415 COLL VENOUS BLD VENIPUNCTURE: CPT

## 2018-06-18 PROCEDURE — 3017F COLORECTAL CA SCREEN DOC REV: CPT | Performed by: PHYSICIAN ASSISTANT

## 2018-06-18 PROCEDURE — G8427 DOCREV CUR MEDS BY ELIG CLIN: HCPCS | Performed by: PHYSICIAN ASSISTANT

## 2018-06-18 PROCEDURE — 70360 X-RAY EXAM OF NECK: CPT

## 2018-06-18 PROCEDURE — G8417 CALC BMI ABV UP PARAM F/U: HCPCS | Performed by: PHYSICIAN ASSISTANT

## 2018-06-18 PROCEDURE — 4004F PT TOBACCO SCREEN RCVD TLK: CPT | Performed by: PHYSICIAN ASSISTANT

## 2018-06-18 RX ORDER — IBUPROFEN 800 MG/1
800 TABLET ORAL EVERY 6 HOURS PRN
Qty: 28 TABLET | Refills: 0 | Status: SHIPPED | OUTPATIENT
Start: 2018-06-18 | End: 2018-06-26 | Stop reason: SDUPTHER

## 2018-06-18 RX ORDER — METHYLPREDNISOLONE 4 MG/1
TABLET ORAL
Qty: 1 KIT | Refills: 0 | Status: SHIPPED | OUTPATIENT
Start: 2018-06-18 | End: 2018-06-26 | Stop reason: ALTCHOICE

## 2018-06-18 RX ORDER — CLINDAMYCIN HYDROCHLORIDE 150 MG/1
150 CAPSULE ORAL 3 TIMES DAILY
Qty: 30 CAPSULE | Refills: 0 | Status: SHIPPED | OUTPATIENT
Start: 2018-06-18 | End: 2018-06-28

## 2018-06-18 ASSESSMENT — PATIENT HEALTH QUESTIONNAIRE - PHQ9
1. LITTLE INTEREST OR PLEASURE IN DOING THINGS: 0
SUM OF ALL RESPONSES TO PHQ9 QUESTIONS 1 & 2: 0
2. FEELING DOWN, DEPRESSED OR HOPELESS: 0
SUM OF ALL RESPONSES TO PHQ QUESTIONS 1-9: 0

## 2018-06-18 ASSESSMENT — ENCOUNTER SYMPTOMS
GASTROINTESTINAL NEGATIVE: 1
EYES NEGATIVE: 1
RESPIRATORY NEGATIVE: 1
SORE THROAT: 1

## 2018-06-25 ENCOUNTER — OFFICE VISIT (OUTPATIENT)
Dept: FAMILY MEDICINE CLINIC | Age: 68
End: 2018-06-25
Payer: MEDICARE

## 2018-06-25 ENCOUNTER — HOSPITAL ENCOUNTER (OUTPATIENT)
Age: 68
Setting detail: SPECIMEN
Discharge: HOME | End: 2018-06-25
Payer: MEDICARE

## 2018-06-25 VITALS
SYSTOLIC BLOOD PRESSURE: 122 MMHG | WEIGHT: 157.8 LBS | DIASTOLIC BLOOD PRESSURE: 74 MMHG | HEIGHT: 64 IN | TEMPERATURE: 98.6 F | OXYGEN SATURATION: 94 % | HEART RATE: 98 BPM | BODY MASS INDEX: 26.94 KG/M2

## 2018-06-25 DIAGNOSIS — R53.83 FATIGUE, UNSPECIFIED TYPE: ICD-10-CM

## 2018-06-25 DIAGNOSIS — D70.4 CYCLICAL NEUTROPENIA (HCC): ICD-10-CM

## 2018-06-25 DIAGNOSIS — E61.1 IRON DEFICIENCY: ICD-10-CM

## 2018-06-25 DIAGNOSIS — D61.818 PANCYTOPENIA (HCC): Primary | ICD-10-CM

## 2018-06-25 DIAGNOSIS — D61.818 PANCYTOPENIA (HCC): ICD-10-CM

## 2018-06-25 LAB
FOLATE: 9.7 NG/ML
TSH SERPL DL<=0.05 MIU/L-ACNC: 1.87 MIU/L (ref 0.3–5)
VITAMIN B-12: 1088 PG/ML (ref 232–1245)

## 2018-06-25 PROCEDURE — 3017F COLORECTAL CA SCREEN DOC REV: CPT | Performed by: FAMILY MEDICINE

## 2018-06-25 PROCEDURE — 4004F PT TOBACCO SCREEN RCVD TLK: CPT | Performed by: FAMILY MEDICINE

## 2018-06-25 PROCEDURE — G8417 CALC BMI ABV UP PARAM F/U: HCPCS | Performed by: FAMILY MEDICINE

## 2018-06-25 PROCEDURE — 3288F FALL RISK ASSESSMENT DOCD: CPT | Performed by: FAMILY MEDICINE

## 2018-06-25 PROCEDURE — 4040F PNEUMOC VAC/ADMIN/RCVD: CPT | Performed by: FAMILY MEDICINE

## 2018-06-25 PROCEDURE — 1123F ACP DISCUSS/DSCN MKR DOCD: CPT | Performed by: FAMILY MEDICINE

## 2018-06-25 PROCEDURE — G8427 DOCREV CUR MEDS BY ELIG CLIN: HCPCS | Performed by: FAMILY MEDICINE

## 2018-06-25 PROCEDURE — 99214 OFFICE O/P EST MOD 30 MIN: CPT | Performed by: FAMILY MEDICINE

## 2018-06-25 ASSESSMENT — ENCOUNTER SYMPTOMS
VOMITING: 0
SORE THROAT: 1
SWOLLEN GLANDS: 1

## 2018-06-26 ENCOUNTER — APPOINTMENT (OUTPATIENT)
Dept: CT IMAGING | Age: 68
End: 2018-06-26
Payer: MEDICARE

## 2018-06-26 ENCOUNTER — HOSPITAL ENCOUNTER (EMERGENCY)
Age: 68
Discharge: HOME | End: 2018-06-26
Attending: EMERGENCY MEDICINE
Payer: MEDICARE

## 2018-06-26 ENCOUNTER — TELEPHONE (OUTPATIENT)
Dept: ONCOLOGY | Age: 68
End: 2018-06-26

## 2018-06-26 ENCOUNTER — OFFICE VISIT (OUTPATIENT)
Dept: ONCOLOGY | Age: 68
End: 2018-06-26
Payer: MEDICARE

## 2018-06-26 VITALS
BODY MASS INDEX: 25.96 KG/M2 | DIASTOLIC BLOOD PRESSURE: 77 MMHG | HEART RATE: 102 BPM | TEMPERATURE: 99.9 F | OXYGEN SATURATION: 99 % | RESPIRATION RATE: 14 BRPM | WEIGHT: 156 LBS | SYSTOLIC BLOOD PRESSURE: 120 MMHG

## 2018-06-26 VITALS
HEIGHT: 65 IN | HEART RATE: 100 BPM | DIASTOLIC BLOOD PRESSURE: 87 MMHG | WEIGHT: 156.44 LBS | TEMPERATURE: 99 F | SYSTOLIC BLOOD PRESSURE: 143 MMHG | BODY MASS INDEX: 26.06 KG/M2

## 2018-06-26 DIAGNOSIS — R07.0 THROAT PAIN: ICD-10-CM

## 2018-06-26 DIAGNOSIS — J02.9 SORE THROAT: Primary | ICD-10-CM

## 2018-06-26 DIAGNOSIS — D61.818 PANCYTOPENIA (HCC): Primary | ICD-10-CM

## 2018-06-26 LAB
ABSOLUTE EOS #: 0 K/UL (ref 0–0.4)
ABSOLUTE IMMATURE GRANULOCYTE: ABNORMAL K/UL (ref 0–0.3)
ABSOLUTE LYMPH #: 1.21 K/UL (ref 1–4.8)
ABSOLUTE MONO #: 0.63 K/UL (ref 0.1–1.3)
ANION GAP SERPL CALCULATED.3IONS-SCNC: 13 MMOL/L (ref 9–17)
BASOPHILS # BLD: 0 % (ref 0–2)
BASOPHILS ABSOLUTE: 0 K/UL (ref 0–0.2)
BUN BLDV-MCNC: 12 MG/DL (ref 8–23)
BUN/CREAT BLD: ABNORMAL (ref 9–20)
CALCIUM SERPL-MCNC: 9.1 MG/DL (ref 8.6–10.4)
CHLORIDE BLD-SCNC: 96 MMOL/L (ref 98–107)
CO2: 26 MMOL/L (ref 20–31)
CREAT SERPL-MCNC: 0.75 MG/DL (ref 0.7–1.2)
DIFFERENTIAL TYPE: ABNORMAL
EOSINOPHILS RELATIVE PERCENT: 0 % (ref 0–4)
GFR AFRICAN AMERICAN: >60 ML/MIN
GFR NON-AFRICAN AMERICAN: >60 ML/MIN
GFR SERPL CREATININE-BSD FRML MDRD: ABNORMAL ML/MIN/{1.73_M2}
GFR SERPL CREATININE-BSD FRML MDRD: ABNORMAL ML/MIN/{1.73_M2}
GLUCOSE BLD-MCNC: 112 MG/DL (ref 70–99)
HCT VFR BLD CALC: 37.2 % (ref 41–53)
HEMOGLOBIN: 12.8 G/DL (ref 13.5–17.5)
IMMATURE GRANULOCYTES: ABNORMAL %
LYMPHOCYTES # BLD: 64 % (ref 24–44)
MCH RBC QN AUTO: 27.8 PG (ref 26–34)
MCHC RBC AUTO-ENTMCNC: 34.4 G/DL (ref 31–37)
MCV RBC AUTO: 80.8 FL (ref 80–100)
MONOCYTES # BLD: 33 % (ref 1–7)
MORPHOLOGY: ABNORMAL
NRBC AUTOMATED: ABNORMAL PER 100 WBC
PATHOLOGIST REVIEW: NORMAL
PDW BLD-RTO: 15.8 % (ref 11.5–14.9)
PLATELET # BLD: 153 K/UL (ref 150–450)
PLATELET ESTIMATE: ABNORMAL
PMV BLD AUTO: 8 FL (ref 6–12)
POTASSIUM SERPL-SCNC: 3.4 MMOL/L (ref 3.7–5.3)
RBC # BLD: 4.6 M/UL (ref 4.5–5.9)
RBC # BLD: ABNORMAL 10*6/UL
SEG NEUTROPHILS: 3 % (ref 36–66)
SEGMENTED NEUTROPHILS ABSOLUTE COUNT: 0.06 K/UL (ref 1.3–9.1)
SODIUM BLD-SCNC: 135 MMOL/L (ref 135–144)
WBC # BLD: 1.9 K/UL (ref 3.5–11)
WBC # BLD: ABNORMAL 10*3/UL

## 2018-06-26 PROCEDURE — 85025 COMPLETE CBC W/AUTO DIFF WBC: CPT

## 2018-06-26 PROCEDURE — 99283 EMERGENCY DEPT VISIT LOW MDM: CPT

## 2018-06-26 PROCEDURE — 36415 COLL VENOUS BLD VENIPUNCTURE: CPT

## 2018-06-26 PROCEDURE — 99214 OFFICE O/P EST MOD 30 MIN: CPT | Performed by: INTERNAL MEDICINE

## 2018-06-26 PROCEDURE — 2580000003 HC RX 258: Performed by: EMERGENCY MEDICINE

## 2018-06-26 PROCEDURE — 80048 BASIC METABOLIC PNL TOTAL CA: CPT

## 2018-06-26 PROCEDURE — 4040F PNEUMOC VAC/ADMIN/RCVD: CPT | Performed by: INTERNAL MEDICINE

## 2018-06-26 PROCEDURE — 1036F TOBACCO NON-USER: CPT | Performed by: INTERNAL MEDICINE

## 2018-06-26 PROCEDURE — 70491 CT SOFT TISSUE NECK W/DYE: CPT

## 2018-06-26 PROCEDURE — 6360000004 HC RX CONTRAST MEDICATION: Performed by: EMERGENCY MEDICINE

## 2018-06-26 PROCEDURE — 1123F ACP DISCUSS/DSCN MKR DOCD: CPT | Performed by: INTERNAL MEDICINE

## 2018-06-26 PROCEDURE — G8417 CALC BMI ABV UP PARAM F/U: HCPCS | Performed by: INTERNAL MEDICINE

## 2018-06-26 PROCEDURE — G8427 DOCREV CUR MEDS BY ELIG CLIN: HCPCS | Performed by: INTERNAL MEDICINE

## 2018-06-26 PROCEDURE — 3017F COLORECTAL CA SCREEN DOC REV: CPT | Performed by: INTERNAL MEDICINE

## 2018-06-26 RX ORDER — 0.9 % SODIUM CHLORIDE 0.9 %
80 INTRAVENOUS SOLUTION INTRAVENOUS ONCE
Status: COMPLETED | OUTPATIENT
Start: 2018-06-26 | End: 2018-06-26

## 2018-06-26 RX ORDER — 0.9 % SODIUM CHLORIDE 0.9 %
1000 INTRAVENOUS SOLUTION INTRAVENOUS ONCE
Status: DISCONTINUED | OUTPATIENT
Start: 2018-06-26 | End: 2018-06-26 | Stop reason: HOSPADM

## 2018-06-26 RX ORDER — SODIUM CHLORIDE 0.9 % (FLUSH) 0.9 %
10 SYRINGE (ML) INJECTION 2 TIMES DAILY
Status: DISCONTINUED | OUTPATIENT
Start: 2018-06-26 | End: 2018-06-26 | Stop reason: HOSPADM

## 2018-06-26 RX ADMIN — Medication 10 ML: at 14:12

## 2018-06-26 RX ADMIN — IOPAMIDOL 75 ML: 755 INJECTION, SOLUTION INTRAVENOUS at 14:12

## 2018-06-26 RX ADMIN — SODIUM CHLORIDE 80 ML: 9 INJECTION, SOLUTION INTRAVENOUS at 14:12

## 2018-06-26 ASSESSMENT — ENCOUNTER SYMPTOMS
EYE REDNESS: 0
ABDOMINAL PAIN: 0
EYE PAIN: 0
COUGH: 0
SORE THROAT: 1
BACK PAIN: 0
SHORTNESS OF BREATH: 0
EYE DISCHARGE: 0
RHINORRHEA: 0
DIARRHEA: 0
VOMITING: 0

## 2018-06-26 ASSESSMENT — PAIN SCALES - GENERAL: PAINLEVEL_OUTOF10: 8

## 2018-06-26 ASSESSMENT — PAIN SCALES - WONG BAKER: WONGBAKER_NUMERICALRESPONSE: 8

## 2018-06-26 ASSESSMENT — PAIN DESCRIPTION - LOCATION: LOCATION: THROAT

## 2018-06-26 ASSESSMENT — PAIN DESCRIPTION - PAIN TYPE: TYPE: ACUTE PAIN

## 2018-07-05 PROBLEM — J02.9 PHARYNGITIS: Status: ACTIVE | Noted: 2018-07-05

## 2018-07-09 ENCOUNTER — HOSPITAL ENCOUNTER (OUTPATIENT)
Dept: GENERAL RADIOLOGY | Age: 68
Discharge: HOME | End: 2018-07-11
Payer: MEDICARE

## 2018-07-09 DIAGNOSIS — K21.00 GASTROESOPHAGEAL REFLUX DISEASE WITH ESOPHAGITIS: ICD-10-CM

## 2018-07-09 PROCEDURE — 2500000003 HC RX 250 WO HCPCS: Performed by: NURSE PRACTITIONER

## 2018-07-09 PROCEDURE — 74220 X-RAY XM ESOPHAGUS 1CNTRST: CPT

## 2018-07-09 RX ADMIN — BARIUM SULFATE 160 ML: 960 POWDER, FOR SUSPENSION ORAL at 09:30

## 2018-07-09 RX ADMIN — BARIUM SULFATE 140 ML: 980 POWDER, FOR SUSPENSION ORAL at 09:29

## 2018-07-20 ENCOUNTER — TELEPHONE (OUTPATIENT)
Dept: ONCOLOGY | Age: 68
End: 2018-07-20

## 2018-07-27 RX ORDER — SODIUM CHLORIDE 9 MG/ML
INJECTION, SOLUTION INTRAVENOUS CONTINUOUS
Status: CANCELLED | OUTPATIENT
Start: 2018-07-27 | End: 2019-07-27

## 2018-07-27 RX ORDER — SODIUM CHLORIDE 0.9 % (FLUSH) 0.9 %
10 SYRINGE (ML) INJECTION PRN
Status: CANCELLED | OUTPATIENT
Start: 2018-07-27 | End: 2019-07-27

## 2018-07-30 ENCOUNTER — HOSPITAL ENCOUNTER (OUTPATIENT)
Dept: CT IMAGING | Age: 68
Discharge: HOME | End: 2018-08-01
Payer: MEDICARE

## 2018-07-30 VITALS
OXYGEN SATURATION: 97 % | TEMPERATURE: 98.6 F | SYSTOLIC BLOOD PRESSURE: 128 MMHG | DIASTOLIC BLOOD PRESSURE: 68 MMHG | HEART RATE: 82 BPM | RESPIRATION RATE: 16 BRPM | HEIGHT: 65 IN | WEIGHT: 153 LBS | BODY MASS INDEX: 25.49 KG/M2

## 2018-07-30 DIAGNOSIS — D61.818 PANCYTOPENIA (HCC): ICD-10-CM

## 2018-07-30 LAB
ABSOLUTE EOS #: 0.03 K/UL (ref 0–0.4)
ABSOLUTE IMMATURE GRANULOCYTE: ABNORMAL K/UL (ref 0–0.3)
ABSOLUTE LYMPH #: 1.08 K/UL (ref 1–4.8)
ABSOLUTE MONO #: 0.2 K/UL (ref 0.1–1.3)
ABSOLUTE RETIC #: 0.12 M/UL (ref 0.02–0.1)
ATYPICAL LYMPHOCYTE ABSOLUTE COUNT: 0.03 K/UL
ATYPICAL LYMPHOCYTES: 2 %
BASOPHILS # BLD: 0 % (ref 0–2)
BASOPHILS ABSOLUTE: 0 K/UL (ref 0–0.2)
CELLS COUNTED: 50
DIFFERENTIAL TYPE: ABNORMAL
EOSINOPHILS RELATIVE PERCENT: 2 % (ref 0–4)
HCT VFR BLD CALC: 32.3 % (ref 41–53)
HEMOGLOBIN: 10.7 G/DL (ref 13.5–17.5)
IMMATURE GRANULOCYTES: ABNORMAL %
IMMATURE RETIC FRACT: ABNORMAL %
INR BLD: 1
LYMPHOCYTES # BLD: 78 % (ref 24–44)
MCH RBC QN AUTO: 27 PG (ref 26–34)
MCHC RBC AUTO-ENTMCNC: 33.3 G/DL (ref 31–37)
MCV RBC AUTO: 81.1 FL (ref 80–100)
MONOCYTES # BLD: 14 % (ref 1–7)
MORPHOLOGY: ABNORMAL
NRBC AUTOMATED: ABNORMAL PER 100 WBC
PARTIAL THROMBOPLASTIN TIME: 28.6 SEC (ref 23–31)
PATHOLOGIST REVIEW: NORMAL
PDW BLD-RTO: 17.2 % (ref 11.5–14.9)
PLATELET # BLD: 115 K/UL (ref 150–450)
PLATELET ESTIMATE: ABNORMAL
PMV BLD AUTO: 9.1 FL (ref 6–12)
PROTHROMBIN TIME: 10.4 SEC (ref 9.7–12)
RBC # BLD: 3.98 M/UL (ref 4.5–5.9)
RBC # BLD: ABNORMAL 10*6/UL
RETIC %: 3.1 % (ref 0.5–2)
RETIC HEMOGLOBIN: ABNORMAL PG (ref 28.2–35.7)
SEG NEUTROPHILS: 4 % (ref 36–66)
SEGMENTED NEUTROPHILS ABSOLUTE COUNT: 0.06 K/UL (ref 1.3–9.1)
WBC # BLD: 1.4 K/UL (ref 3.5–11)
WBC # BLD: ABNORMAL 10*3/UL

## 2018-07-30 PROCEDURE — 88185 FLOWCYTOMETRY/TC ADD-ON: CPT

## 2018-07-30 PROCEDURE — 81342 TRG GENE REARRANGEMENT ANAL: CPT

## 2018-07-30 PROCEDURE — 88275 CYTOGENETICS 100-300: CPT

## 2018-07-30 PROCEDURE — 88184 FLOWCYTOMETRY/ TC 1 MARKER: CPT

## 2018-07-30 PROCEDURE — 7100000030 HC ASPR PHASE II RECOVERY - FIRST 15 MIN

## 2018-07-30 PROCEDURE — 88305 TISSUE EXAM BY PATHOLOGIST: CPT

## 2018-07-30 PROCEDURE — 7100000031 HC ASPR PHASE II RECOVERY - ADDTL 15 MIN

## 2018-07-30 PROCEDURE — 36415 COLL VENOUS BLD VENIPUNCTURE: CPT

## 2018-07-30 PROCEDURE — 88264 CHROMOSOME ANALYSIS 20-25: CPT

## 2018-07-30 PROCEDURE — 88342 IMHCHEM/IMCYTCHM 1ST ANTB: CPT

## 2018-07-30 PROCEDURE — 88271 CYTOGENETICS DNA PROBE: CPT

## 2018-07-30 PROCEDURE — 88311 DECALCIFY TISSUE: CPT

## 2018-07-30 PROCEDURE — 38221 DX BONE MARROW BIOPSIES: CPT

## 2018-07-30 PROCEDURE — 88313 SPECIAL STAINS GROUP 2: CPT

## 2018-07-30 PROCEDURE — 85025 COMPLETE CBC W/AUTO DIFF WBC: CPT

## 2018-07-30 PROCEDURE — 85610 PROTHROMBIN TIME: CPT

## 2018-07-30 PROCEDURE — 85045 AUTOMATED RETICULOCYTE COUNT: CPT

## 2018-07-30 PROCEDURE — 2500000003 HC RX 250 WO HCPCS: Performed by: RADIOLOGY

## 2018-07-30 PROCEDURE — 2580000003 HC RX 258: Performed by: RADIOLOGY

## 2018-07-30 PROCEDURE — 6360000002 HC RX W HCPCS: Performed by: RADIOLOGY

## 2018-07-30 PROCEDURE — 88237 TISSUE CULTURE BONE MARROW: CPT

## 2018-07-30 PROCEDURE — 88341 IMHCHEM/IMCYTCHM EA ADD ANTB: CPT

## 2018-07-30 PROCEDURE — 88280 CHROMOSOME KARYOTYPE STUDY: CPT

## 2018-07-30 PROCEDURE — 85730 THROMBOPLASTIN TIME PARTIAL: CPT

## 2018-07-30 RX ORDER — SODIUM CHLORIDE 0.9 % (FLUSH) 0.9 %
10 SYRINGE (ML) INJECTION PRN
Status: DISCONTINUED | OUTPATIENT
Start: 2018-07-30 | End: 2018-08-02 | Stop reason: HOSPADM

## 2018-07-30 RX ORDER — ACETAMINOPHEN 325 MG/1
650 TABLET ORAL EVERY 4 HOURS PRN
Status: DISCONTINUED | OUTPATIENT
Start: 2018-07-30 | End: 2018-08-02 | Stop reason: HOSPADM

## 2018-07-30 RX ORDER — FENTANYL CITRATE 50 UG/ML
INJECTION, SOLUTION INTRAMUSCULAR; INTRAVENOUS
Status: COMPLETED | OUTPATIENT
Start: 2018-07-30 | End: 2018-07-30

## 2018-07-30 RX ORDER — SODIUM CHLORIDE 9 MG/ML
INJECTION, SOLUTION INTRAVENOUS CONTINUOUS
Status: DISCONTINUED | OUTPATIENT
Start: 2018-07-30 | End: 2018-08-02 | Stop reason: HOSPADM

## 2018-07-30 RX ORDER — MIDAZOLAM HYDROCHLORIDE 1 MG/ML
INJECTION INTRAMUSCULAR; INTRAVENOUS
Status: COMPLETED | OUTPATIENT
Start: 2018-07-30 | End: 2018-07-30

## 2018-07-30 RX ORDER — LIDOCAINE HYDROCHLORIDE 20 MG/ML
INJECTION, SOLUTION INFILTRATION; PERINEURAL
Status: COMPLETED | OUTPATIENT
Start: 2018-07-30 | End: 2018-07-30

## 2018-07-30 RX ADMIN — MIDAZOLAM 1 MG: 1 INJECTION INTRAMUSCULAR; INTRAVENOUS at 09:23

## 2018-07-30 RX ADMIN — LIDOCAINE HYDROCHLORIDE 5 ML: 20 INJECTION, SOLUTION INFILTRATION; PERINEURAL at 09:27

## 2018-07-30 RX ADMIN — SODIUM CHLORIDE: 9 INJECTION, SOLUTION INTRAVENOUS at 09:10

## 2018-07-30 RX ADMIN — FENTANYL CITRATE 50 MCG: 50 INJECTION, SOLUTION INTRAMUSCULAR; INTRAVENOUS at 09:29

## 2018-07-30 ASSESSMENT — PAIN SCALES - GENERAL
PAINLEVEL_OUTOF10: 0

## 2018-07-30 ASSESSMENT — PAIN - FUNCTIONAL ASSESSMENT: PAIN_FUNCTIONAL_ASSESSMENT: 0-10

## 2018-07-30 NOTE — H&P
Prior to Encounter   Medication Sig Dispense Refill    omeprazole (PRILOSEC) 40 MG delayed release capsule Take 1 capsule by mouth daily 30 capsule 0    famotidine (PEPCID) 20 MG tablet Take 1 tablet by mouth 2 times daily 60 tablet 3    fluticasone (FLONASE) 50 MCG/ACT nasal spray 2 sprays by Nasal route daily 1 Bottle 0    enalapril (VASOTEC) 20 MG tablet TAKE ONE TABLET BY MOUTH TWICE DAILY 180 tablet 1    hydrochlorothiazide (HYDRODIURIL) 25 MG tablet TAKE ONE TABLET BY MOUTH DAILY 90 tablet 1    levothyroxine (SYNTHROID) 150 MCG tablet TAKE ONE TABLET BY MOUTH DAILY EXCEPT ON MONDAY AND THURSDAY TAKE ONE AND ONE-HALF TABLETS 120 tablet 1    metFORMIN (GLUCOPHAGE) 500 MG tablet Take 1 tablet by mouth 2 times daily (with meals) 180 tablet 1    ferrous sulfate 325 (65 FE) MG tablet Take 325 mg by mouth daily (with breakfast)      ibuprofen-famotidine 800-26.6 MG TABS Take 1 tablet by mouth 2 times daily 90 tablet 2    Glucose Blood (BLOOD GLUCOSE TEST STRIPS) STRP monitor blood sugars daily. Fasting blood sugars and pre dinner blood sugars on alternate days. DX:  E11.9  Please fill for One Touch Ultra 100 strip 0    Lancets MISC monitor blood sugars daily. Fasting blood sugars and pre dinner blood sugars on alternate days. DX:  E11.9 100 each 0    atorvastatin (LIPITOR) 10 MG tablet Take 1 tablet by mouth daily 90 tablet 3     No current facility-administered medications on file prior to encounter. Negative except for what is mentioned in the HPI. GENERAL PHYSICAL EXAM     Vitals: /74   Pulse 82   Temp 97.9 °F (36.6 °C)   Resp 16   Ht 5' 5\" (1.651 m)   Wt 153 lb (69.4 kg)   SpO2 100%   BMI 25.46 kg/m²  Body mass index is 25.46 kg/m². GENERAL APPEARANCE:   Faina Silver is 78 y.o.,  male, mildly obese, nourished, conscious, alert. Does not appear to be distress or pain at this time. SKIN:  Warm, dry, no cyanosis or jaundice. HEAD:  Normocephalic, atraumatic, no swelling or tenderness. EYES:  Pupils equal, reactive to light. EARS:  No discharge, no marked hearing loss. NOSE:  No rhinorrhea, epistaxis or septal deformity. THROAT:  Not congested. No ulceration bleeding or discharge. NECK:  No stiffness, trachea central.  No palpable masses or L.N.                 CHEST:  Symmetrical and equal on expansion. HEART:  RRR S1 > S2. No audible murmurs or gallops. LUNGS:  Equal on expansion, normal breath sounds. No adventitious sounds. ABDOMEN:  Obese. Soft on palpation. No localized tenderness. No guarding or rigidity. No palpable hepatosplenomegaly. LYMPHATICS:  No palpable cervical lymphadenopathy. LOCOMOTOR, BACK AND SPINE:  No tenderness or deformities. EXTREMITIES:  Symmetrical, no pretibial edema. Oralias sign negative. No discoloration or ulcerations. NEUROLOGIC:  The patient is conscious, alert, oriented, No apparent focal sensory or motor deficits. PROVISIONAL DIAGNOSES / SURGERY:      Bone marrow Bx. Pancytopenia, Low WBC count.     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

## 2018-07-30 NOTE — PRE SEDATION
Sedation Pre-Procedure Note    Patient Name: Kody Vasquez   YOB: 1950  Room/Bed: Room/bed info not found  Medical Record Number: [id number]  Date: 7/30/2018   Time: 9:06 AM       Indication:  Neutropenia    Consent: I have discussed with the patient and/or the patient representative the indication, alternatives, and the possible risks and/or complications of the planned procedure and the anesthesia methods. The patient and/or patient representative appear to understand and agree to proceed. Vital Signs:   Vitals:    07/30/18 0817   BP: 135/74   Pulse: 82   Resp: 16   Temp: 97.9 °F (36.6 °C)   SpO2: 100%       Past Medical History:   has a past medical history of Arthritis; Colon polyps; History of colon polyps; Hypothyroidism; Iron deficiency; Neutropenia ([address] Osteoarthritis; Prediabetes; Prominent ileocecal valve; and Unspecified essential hypertension. Past Surgical History:   has a past surgical history that includes Colonoscopy (06-11-14); Upper gastrointestinal endoscopy (6/11/2014); and Colonoscopy (07/03/2017). Medications:   Scheduled Meds:   Continuous Infusions:    sodium chloride       PRN Meds: sodium chloride flush  Home Meds:   Prior to Admission medications    Medication Sig Start Date End Date Taking?  Authorizing Provider   omeprazole (PRILOSEC) 40 MG delayed release capsule Take 1 capsule by mouth daily 7/5/18 8/4/18 Yes MELISSA Schuster CNP   famotidine (PEPCID) 20 MG tablet Take 1 tablet by mouth 2 times daily 7/5/18  Yes MELISSA Schuster CNP   fluticasone [facility]) 50 MCG/ACT nasal spray 2 sprays by Nasal route daily 7/5/18  Yes MELISSA Schuster CNP   enalapril (VASOTEC) 20 MG tablet TAKE ONE TABLET BY MOUTH TWICE DAILY 2/26/18  Yes Vitaly Tran MD   hydrochlorothiazide (HYDRODIURIL) 25 MG tablet TAKE ONE TABLET BY MOUTH DAILY 2/26/18  Yes Vitaly Tran MD   levothyroxine (SYNTHROID) 150 MCG tablet TAKE ONE TABLET BY MOUTH DAILY EXCEPT ON MONDAY AND THURSDAY

## 2018-07-30 NOTE — POST SEDATION
Sedation Post Procedure Note    Patient Name: Rex Coats   YOB: 1950  Room/Bed: Room/bed info not found  Medical Record Number: [id number]  Date: 7/30/2018   Time: 9:44 AM         Physicians/Assistants: Daniel Smith MD    Procedure Performed:  CT guided random left iliac bone marrow biopsy and aspiration    Post-Sedation Vital Signs:  Vitals:    07/30/18 0940   BP: 115/70   Pulse: 88   Resp: 18   Temp:    SpO2: 94%      Vital signs were reviewed and were stable after the procedure (see flow sheet for vitals)            Complications: none    Electronically signed by Daniel Smith MD on 7/30/2018 at 9:44 AM

## 2018-08-01 LAB
FLOW CYTOMETRY, BM: NORMAL
SURGICAL PATHOLOGY REPORT: NORMAL

## 2018-08-03 LAB — CHROMOSOME STUDY: NORMAL

## 2018-08-06 ENCOUNTER — HOSPITAL ENCOUNTER (OUTPATIENT)
Age: 68
Setting detail: SPECIMEN
Discharge: HOME | End: 2018-08-06
Payer: MEDICARE

## 2018-08-06 DIAGNOSIS — R17 JAUNDICE: ICD-10-CM

## 2018-08-06 LAB
ALBUMIN SERPL-MCNC: 3.7 G/DL (ref 3.5–5.2)
ALBUMIN/GLOBULIN RATIO: 0.8 (ref 1–2.5)
ALP BLD-CCNC: 84 U/L (ref 40–129)
ALT SERPL-CCNC: 10 U/L (ref 5–41)
ANION GAP SERPL CALCULATED.3IONS-SCNC: 9 MMOL/L (ref 9–17)
AST SERPL-CCNC: 13 U/L
BILIRUB SERPL-MCNC: 0.34 MG/DL (ref 0.3–1.2)
BUN BLDV-MCNC: 19 MG/DL (ref 8–23)
BUN/CREAT BLD: ABNORMAL (ref 9–20)
CALCIUM SERPL-MCNC: 9.3 MG/DL (ref 8.6–10.4)
CHLORIDE BLD-SCNC: 98 MMOL/L (ref 98–107)
CO2: 28 MMOL/L (ref 20–31)
CREAT SERPL-MCNC: 0.81 MG/DL (ref 0.7–1.2)
GFR AFRICAN AMERICAN: >60 ML/MIN
GFR NON-AFRICAN AMERICAN: >60 ML/MIN
GFR SERPL CREATININE-BSD FRML MDRD: ABNORMAL ML/MIN/{1.73_M2}
GFR SERPL CREATININE-BSD FRML MDRD: ABNORMAL ML/MIN/{1.73_M2}
GLUCOSE BLD-MCNC: 108 MG/DL (ref 70–99)
LIPASE: 39 U/L (ref 13–60)
POTASSIUM SERPL-SCNC: 4 MMOL/L (ref 3.7–5.3)
SODIUM BLD-SCNC: 135 MMOL/L (ref 135–144)
TOTAL PROTEIN: 8.3 G/DL (ref 6.4–8.3)

## 2018-08-09 ENCOUNTER — TELEPHONE (OUTPATIENT)
Dept: ONCOLOGY | Age: 68
End: 2018-08-09

## 2018-08-16 ENCOUNTER — TELEPHONE (OUTPATIENT)
Dept: ONCOLOGY | Age: 68
End: 2018-08-16

## 2018-08-16 ENCOUNTER — OFFICE VISIT (OUTPATIENT)
Dept: ONCOLOGY | Age: 68
End: 2018-08-16
Payer: MEDICARE

## 2018-08-16 VITALS
HEART RATE: 83 BPM | BODY MASS INDEX: 27.18 KG/M2 | DIASTOLIC BLOOD PRESSURE: 69 MMHG | SYSTOLIC BLOOD PRESSURE: 112 MMHG | WEIGHT: 163.31 LBS | TEMPERATURE: 98.3 F

## 2018-08-16 DIAGNOSIS — D75.89 BONE MARROW HYPERPLASIA: Primary | ICD-10-CM

## 2018-08-16 DIAGNOSIS — R59.9 LYMPHOID HYPERPLASIA: ICD-10-CM

## 2018-08-16 DIAGNOSIS — C88.9: ICD-10-CM

## 2018-08-16 DIAGNOSIS — E61.1 IRON DEFICIENCY: ICD-10-CM

## 2018-08-16 PROCEDURE — 99214 OFFICE O/P EST MOD 30 MIN: CPT | Performed by: INTERNAL MEDICINE

## 2018-08-16 PROCEDURE — 1036F TOBACCO NON-USER: CPT | Performed by: INTERNAL MEDICINE

## 2018-08-16 PROCEDURE — 4040F PNEUMOC VAC/ADMIN/RCVD: CPT | Performed by: INTERNAL MEDICINE

## 2018-08-16 PROCEDURE — G8427 DOCREV CUR MEDS BY ELIG CLIN: HCPCS | Performed by: INTERNAL MEDICINE

## 2018-08-16 PROCEDURE — G8417 CALC BMI ABV UP PARAM F/U: HCPCS | Performed by: INTERNAL MEDICINE

## 2018-08-16 PROCEDURE — 1123F ACP DISCUSS/DSCN MKR DOCD: CPT | Performed by: INTERNAL MEDICINE

## 2018-08-16 PROCEDURE — 1101F PT FALLS ASSESS-DOCD LE1/YR: CPT | Performed by: INTERNAL MEDICINE

## 2018-08-16 PROCEDURE — 3017F COLORECTAL CA SCREEN DOC REV: CPT | Performed by: INTERNAL MEDICINE

## 2018-08-16 PROCEDURE — 99211 OFF/OP EST MAY X REQ PHY/QHP: CPT | Performed by: INTERNAL MEDICINE

## 2018-08-16 NOTE — PROGRESS NOTES
 enalapril (VASOTEC) 20 MG tablet TAKE ONE TABLET BY MOUTH TWICE DAILY 180 tablet 1    hydrochlorothiazide (HYDRODIURIL) 25 MG tablet TAKE ONE TABLET BY MOUTH DAILY 90 tablet 1    levothyroxine (SYNTHROID) 150 MCG tablet TAKE ONE TABLET BY MOUTH DAILY EXCEPT ON MONDAY AND THURSDAY TAKE ONE AND ONE-HALF TABLETS 120 tablet 1    metFORMIN (GLUCOPHAGE) 500 MG tablet Take 1 tablet by mouth 2 times daily (with meals) 180 tablet 1    ferrous sulfate 325 (65 FE) MG tablet Take 325 mg by mouth daily (with breakfast)      meclizine (ANTIVERT) 12.5 MG tablet Take 1 tablet by mouth 3 times daily as needed for Dizziness 20 tablet 0     No current facility-administered medications for this visit. REVIEW OF SYSTEM:     Constitutional: No fever or chills. No night sweats, no weight loss, some fatigue late during the day  Eyes: No eye discharge, double vision, or eye pain   HEENT: negative for sore mouth, sore throat, hoarseness and voice change   Respiratory: negative for cough , sputum, dyspnea, wheezing, hemoptysis, chest pain   Cardiovascular: negative for chest pain, dyspnea, palpitations, orthopnea, PND   Gastrointestinal: negative for nausea, vomiting, diarrhea, constipation, abdominal pain, Dysphagia, hematemesis and hematochezia, +rectal pain.   Genitourinary: negative for frequency, dysuria, nocturia, urinary incontinence, and hematuria   Integument: ++ Edematous macular skin rash over torso, upper and lower extremity  Hematologic/Lymphatic: negative for easy bruising, bleeding, lymphadenopathy, petechiae and swelling/edema   Endocrine: negative for heat or cold intolerance, tremor, weight changes, change in bowel habits and hair loss   Musculoskeletal: negative for myalgias, arthralgias, pain, joint swelling,and bone pain   Neurological: negative for headaches, dizziness, seizures, weakness, numbness     OBJECTIVE:         Vitals:    08/16/18 0859   BP: 112/69   Pulse: 83   Temp: 98.3 °F (36.8 °C) PHYSICAL EXAM:   General appearance - well appearing, no in pain or distress   Mental status - alert and cooperative   Eyes - pupils equal and reactive, extraocular eye movements intact   Ears - bilateral TM's and external ear canals normal   Mouth - mucous membranes moist, pharynx normal without lesions   Neck - supple, no significant adenopathy   Lymphatics - no palpable lymphadenopathy, no hepatosplenomegaly   Chest - clear to auscultation, no wheezes, rales or rhonchi, symmetric air entry   Heart - normal rate, regular rhythm, normal S1, S2, no murmurs, rubs, clicks or gallops   Abdomen - soft, nontender, nondistended, no masses or organomegaly   Neurological - alert, oriented, normal speech, no focal findings or movement disorder noted   Musculoskeletal - no joint tenderness, deformity or swelling   Extremities - peripheral pulses normal, no pedal edema, no clubbing or cyanosis   Skin - ++ Edematous macular skin rash over torso, upper and lower extremity    LABORATORY DATA:     Lab Results   Component Value Date    WBC 1.4 (LL) 07/30/2018    HGB 10.7 (L) 07/30/2018    HCT 32.3 (L) 07/30/2018    MCV 81.1 07/30/2018     (L) 07/30/2018       Chemistry        Component Value Date/Time     08/06/2018 0959    K 4.0 08/06/2018 0959    CL 98 08/06/2018 0959    CO2 28 08/06/2018 0959    BUN 19 08/06/2018 0959    CREATININE 0.81 08/06/2018 0959        Component Value Date/Time    CALCIUM 9.3 08/06/2018 0959    ALKPHOS 84 08/06/2018 0959    AST 13 08/06/2018 0959    ALT 10 08/06/2018 0959    BILITOT 0.34 08/06/2018 0959        PATHOLOGY DATA:   Final Diagnosis  SPECIMEN \"A\": DUODENUM, BIOPSY:    DUODENAL TYPE MUCOSA DEMONSTRATING NO SIGNIFICANT HISTOPATHOLOGIC  FEATURES    THE FEATURES OF SPRUE ARE NOT IDENTIFIED   SPECIMEN \"B\": LOWER RIGHT COLON, BIOPSY:  TUBULAR ADENOMA   SPECIMEN \"C\": ILEOCECAL VALVE, BIOPSY:    BENIGN SMALL INTESTINAL TYPE MUCOSA WITH FEATURES OF  PSEUDOLIPOMATOSIS  SCANT FRAGMENT OF

## 2018-08-16 NOTE — TELEPHONE ENCOUNTER
Pet scan scheduled for 08-27-18 at [facility]. Pet scan order, demographics, office notes, pathology and Radiology reports faxed to [facility] at [phone]/SC[facility] confirmation received. Follow up scheduled for 09-06-18.

## 2018-08-17 PROBLEM — D75.89: Status: ACTIVE | Noted: 2018-08-17

## 2018-08-17 PROBLEM — R59.9 LYMPHOID HYPERPLASIA: Status: ACTIVE | Noted: 2018-08-17

## 2018-08-17 PROBLEM — C88.9: Status: ACTIVE | Noted: 2018-08-17

## 2018-08-17 PROBLEM — C88.90: Status: ACTIVE | Noted: 2018-08-17

## 2018-08-27 ENCOUNTER — HOSPITAL ENCOUNTER (OUTPATIENT)
Dept: NUCLEAR MEDICINE | Age: 68
Discharge: HOME | End: 2018-08-29
Payer: MEDICARE

## 2018-08-27 DIAGNOSIS — C88.9: ICD-10-CM

## 2018-08-27 DIAGNOSIS — D75.89 BONE MARROW HYPERPLASIA: ICD-10-CM

## 2018-08-27 DIAGNOSIS — R59.9 LYMPHOID HYPERPLASIA: ICD-10-CM

## 2018-08-27 PROCEDURE — 78815 PET IMAGE W/CT SKULL-THIGH: CPT

## 2018-08-27 PROCEDURE — 3430000000 HC RX DIAGNOSTIC RADIOPHARMACEUTICAL: Performed by: INTERNAL MEDICINE

## 2018-08-27 PROCEDURE — A9552 F18 FDG: HCPCS | Performed by: INTERNAL MEDICINE

## 2018-08-27 RX ADMIN — FLUDEOXYGLUCOSE F 18 15.68 MILLICURIE: 200 INJECTION, SOLUTION INTRAVENOUS at 16:10

## 2018-08-28 RX ORDER — FLUDEOXYGLUCOSE F 18 200 MCI/ML
15.68 INJECTION, SOLUTION INTRAVENOUS
Status: COMPLETED | OUTPATIENT
Start: 2018-08-28 | End: 2018-08-27

## 2018-09-06 ENCOUNTER — OFFICE VISIT (OUTPATIENT)
Dept: ONCOLOGY | Age: 68
End: 2018-09-06
Payer: MEDICARE

## 2018-09-06 VITALS
HEART RATE: 66 BPM | DIASTOLIC BLOOD PRESSURE: 76 MMHG | WEIGHT: 167.7 LBS | TEMPERATURE: 97.9 F | SYSTOLIC BLOOD PRESSURE: 133 MMHG | BODY MASS INDEX: 27.91 KG/M2

## 2018-09-06 DIAGNOSIS — D47.9 LYMPHOPROLIFERATIVE DISORDER (HCC): Primary | ICD-10-CM

## 2018-09-06 DIAGNOSIS — D49.89 NEOPLASM OF UNSPECIFIED BEHAVIOR OF OTHER SPECIFIED SITES: ICD-10-CM

## 2018-09-06 DIAGNOSIS — C83.80 OTHER NON-FOLLICULAR LYMPHOMA, UNSPECIFIED BODY REGION (HCC): ICD-10-CM

## 2018-09-06 DIAGNOSIS — R63.4 ABNORMAL WEIGHT LOSS: ICD-10-CM

## 2018-09-06 DIAGNOSIS — D75.89 BONE MARROW HYPERPLASIA: ICD-10-CM

## 2018-09-06 DIAGNOSIS — C84.90 MATURE NK/T-CELL LYMPHOMA, UNSPECIFIED BODY REGION, UNSPECIFIED MATURE NK/T-CELL LYMPHOMA TYPE (HCC): ICD-10-CM

## 2018-09-06 PROCEDURE — 99215 OFFICE O/P EST HI 40 MIN: CPT | Performed by: INTERNAL MEDICINE

## 2018-09-06 PROCEDURE — G8417 CALC BMI ABV UP PARAM F/U: HCPCS | Performed by: INTERNAL MEDICINE

## 2018-09-06 PROCEDURE — 4040F PNEUMOC VAC/ADMIN/RCVD: CPT | Performed by: INTERNAL MEDICINE

## 2018-09-06 PROCEDURE — 3017F COLORECTAL CA SCREEN DOC REV: CPT | Performed by: INTERNAL MEDICINE

## 2018-09-06 PROCEDURE — 1101F PT FALLS ASSESS-DOCD LE1/YR: CPT | Performed by: INTERNAL MEDICINE

## 2018-09-06 PROCEDURE — 99211 OFF/OP EST MAY X REQ PHY/QHP: CPT | Performed by: INTERNAL MEDICINE

## 2018-09-06 PROCEDURE — 1123F ACP DISCUSS/DSCN MKR DOCD: CPT | Performed by: INTERNAL MEDICINE

## 2018-09-06 PROCEDURE — G8427 DOCREV CUR MEDS BY ELIG CLIN: HCPCS | Performed by: INTERNAL MEDICINE

## 2018-09-06 PROCEDURE — 1036F TOBACCO NON-USER: CPT | Performed by: INTERNAL MEDICINE

## 2018-09-06 NOTE — PROGRESS NOTES
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 He reports that he was seen by his PCP in October 2013, for routine follow up visit. His Lab work at that time revealed low WBC 3.0K. So his lab work was repeated by his PCP in  Jan 2014. Which again revealed WBC 2.6 and ANC of 0.5, so he was referred to our hematology clinic for further evaluation and recommendations. His WBC have been stable for last few months, however his iron levels were mildly low so I started him on PO iron. INTERVAL HISTORY:  Perla Gandara is returning for follow up visit for his chronic neutropenia. His bone marrow biopsy showed lymphoid hyperplasia. She is here to discuss the PET scan results which was negative for any lymphadenopathy. He denied any fever chills, night sweats. His WBC and plts have dropped recently. No bleeding symptoms, easy bruising. During this visit patient's allergy, social, medical, surgical history and medications were reviewed and updated. Current Outpatient Prescriptions   Medication Sig Dispense Refill    blood glucose monitor strips Check BS 2-3 times daily 100 strip 0    atorvastatin (LIPITOR) 10 MG tablet Take 1 tablet by mouth daily 90 tablet 3    enalapril (VASOTEC) 20 MG tablet TAKE ONE TABLET BY MOUTH TWICE DAILY 180 tablet 1    fluticasone (FLONASE) 50 MCG/ACT nasal spray 2 sprays by Nasal route daily 1 Bottle 3    hydrochlorothiazide (HYDRODIURIL) 25 MG tablet TAKE ONE TABLET BY MOUTH DAILY 90 tablet 1    Lancets MISC monitor blood sugars daily. Fasting blood sugars and pre dinner blood sugars on alternate days.     DX:  E11.9 100 each 0    levothyroxine (SYNTHROID) 150 MCG tablet TAKE ONE mucous membranes moist, pharynx normal without lesions   Neck - supple, no significant adenopathy   Lymphatics - no palpable lymphadenopathy, no hepatosplenomegaly   Chest - clear to auscultation, no wheezes, rales or rhonchi, symmetric air entry   Heart - normal rate, regular rhythm, normal S1, S2, no murmurs, rubs, clicks or gallops   Abdomen - soft, nontender, nondistended, no masses or organomegaly   Neurological - alert, oriented, normal speech, no focal findings or movement disorder noted   Musculoskeletal - no joint tenderness, deformity or swelling   Extremities - peripheral pulses normal, no pedal edema, no clubbing or cyanosis   Skin - ++ Edematous macular skin rash over torso, upper and lower extremity    LABORATORY DATA:     Lab Results   Component Value Date    WBC 1.4 (LL) 07/30/2018    HGB 10.7 (L) 07/30/2018    HCT 32.3 (L) 07/30/2018    MCV 81.1 07/30/2018     (L) 07/30/2018       Chemistry        Component Value Date/Time     08/06/2018 0959    K 4.0 08/06/2018 0959    CL 98 08/06/2018 0959    CO2 28 08/06/2018 0959    BUN 19 08/06/2018 0959    CREATININE 0.81 08/06/2018 0959        Component Value Date/Time    CALCIUM 9.3 08/06/2018 0959    ALKPHOS 84 08/06/2018 0959    AST 13 08/06/2018 0959    ALT 10 08/06/2018 0959    BILITOT 0.34 08/06/2018 0959        PATHOLOGY DATA:   Final Diagnosis  SPECIMEN \"A\": DUODENUM, BIOPSY:    DUODENAL TYPE MUCOSA DEMONSTRATING NO SIGNIFICANT HISTOPATHOLOGIC  FEATURES    THE FEATURES OF SPRUE ARE NOT IDENTIFIED   SPECIMEN \"B\": LOWER RIGHT COLON, BIOPSY:  TUBULAR ADENOMA   SPECIMEN \"C\": ILEOCECAL VALVE, BIOPSY:    BENIGN SMALL INTESTINAL TYPE MUCOSA WITH FEATURES OF  PSEUDOLIPOMATOSIS  SCANT FRAGMENT OF BENIGN FIBROADIPOSE TISSUE     Final Diagnosis   7/30/18 LEFT POSTERIOR ILIAC CREST, BONE NEEDLE BIOPSY, SEDIMENT AND REED   STAINED SMEARS OF BONE MARROW ASPIRATE SHOWING:         PATCHY MARKED HYPERCELLULARITY WITH PRESENCE OF IRON STORES     RELATIVE LYMPHOCYTOSIS (46%) WITH T-LYMPHOCYTE HYPERPLASIA     ERYTHROID HYPERPLASIA (ME RATIO IS 1.2:1)     MARKED DECREASE IN BANDS AND SEGMENTED NEUTROPHILS (5 AND 1% RESPECTIVELY)     MEGAKARYOCYTES ARE PRESENT     NO FOREIGN CELLS OR GRANULOMATA IDENTIFIED   ADDENDUM COMMENT   Additional immunostains are performed (CD4, CD8, CD5 and CD7).  These   immunostains fail to demonstrate significant aberrant staining (controls adequate).  There is a slight increased number of CD8 positive T-cells as compared to CD4 positive cells.  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 This is of uncertain clinical significance. A portion of the bone marrow was sent to [facility] for cytogenetic studies including FISH. [facility], [facility] Sim results are as follows. (Please see their separate report [id number] for complete results). Modal # of Chromosomes:     46          No. of Cells Counted:     20   Staining Method:          GTG;FISH     No. of Cells Analyzed:     20   No. and/or Type of Cultures:     24MF;48CM     Hypomodal Cells:     2   No. of Karyograms:     6          Hypermodal Cells:     1     Karyotype:   46,XY[20]. nuc   tiffany(D5S23,EGR1)x2[200],(CEP7,M0L547)x2[200],(T0Z427,CEP8)x2[200],   (TEL20p,G80P825)x2[200]     Cytogenetic Diagnosis   A.     Normal male chromosome complement   B.      FISH: No evidence of -5/del(5q), -7/del(7q) or -20/del(20q)   C.      FISH: No evidence of +8     Interpretation:   Cytogenetic examination of twenty metaphase cells showed a normal   karyotype with no consistent numerical or structural chromosome   aberrations observed.       Fluorescence in situ hybridization (FISH) analysis was performed using   1) the LSI 5q EGR1 (5q31, SpectrumOrange)/D5S23,W7F412 (5p15.2,   SpectrumGreen) Dual Color DNA probe set (Vysis) which detects deletions of 5q31 containing the EGR1 locus and can be used to identify loss of chromosome 5; 2) the LSI C1N831 (7q31,

## 2018-09-17 ENCOUNTER — TELEPHONE (OUTPATIENT)
Dept: ONCOLOGY | Age: 68
End: 2018-09-17

## 2018-10-25 ENCOUNTER — HOSPITAL ENCOUNTER (OUTPATIENT)
Age: 68
Discharge: HOME | End: 2018-10-25
Payer: MEDICARE

## 2018-10-25 ENCOUNTER — TELEPHONE (OUTPATIENT)
Dept: ONCOLOGY | Age: 68
End: 2018-10-25

## 2018-10-25 DIAGNOSIS — R63.4 ABNORMAL WEIGHT LOSS: ICD-10-CM

## 2018-10-25 DIAGNOSIS — D47.9 LYMPHOPROLIFERATIVE DISORDER (HCC): ICD-10-CM

## 2018-10-25 DIAGNOSIS — C83.80 OTHER NON-FOLLICULAR LYMPHOMA, UNSPECIFIED BODY REGION (HCC): ICD-10-CM

## 2018-10-25 LAB
ABSOLUTE BANDS #: 0.04 K/UL (ref 0–1)
ABSOLUTE EOS #: 0.01 K/UL (ref 0–0.4)
ABSOLUTE IMMATURE GRANULOCYTE: ABNORMAL K/UL (ref 0–0.3)
ABSOLUTE LYMPH #: 0.96 K/UL (ref 1–4.8)
ABSOLUTE MONO #: 0.28 K/UL (ref 0.1–1.3)
ALBUMIN SERPL-MCNC: 3.7 G/DL (ref 3.5–5.2)
ALBUMIN/GLOBULIN RATIO: ABNORMAL (ref 1–2.5)
ALP BLD-CCNC: 95 U/L (ref 40–129)
ALT SERPL-CCNC: 15 U/L (ref 5–41)
ANION GAP SERPL CALCULATED.3IONS-SCNC: 9 MMOL/L (ref 9–17)
AST SERPL-CCNC: 15 U/L
ATYPICAL LYMPHOCYTE ABSOLUTE COUNT: 0.01 K/UL
ATYPICAL LYMPHOCYTES: 1 %
BANDS: 3 % (ref 0–10)
BASOPHILS # BLD: 2 % (ref 0–2)
BASOPHILS ABSOLUTE: 0.03 K/UL (ref 0–0.2)
BILIRUB SERPL-MCNC: 0.43 MG/DL (ref 0.3–1.2)
BUN BLDV-MCNC: 17 MG/DL (ref 8–23)
BUN/CREAT BLD: ABNORMAL (ref 9–20)
CALCIUM SERPL-MCNC: 9.4 MG/DL (ref 8.6–10.4)
CHLORIDE BLD-SCNC: 101 MMOL/L (ref 98–107)
CO2: 27 MMOL/L (ref 20–31)
CREAT SERPL-MCNC: 0.97 MG/DL (ref 0.7–1.2)
DIFFERENTIAL TYPE: ABNORMAL
EOSINOPHILS RELATIVE PERCENT: 1 % (ref 0–4)
GFR AFRICAN AMERICAN: >60 ML/MIN
GFR NON-AFRICAN AMERICAN: >60 ML/MIN
GFR SERPL CREATININE-BSD FRML MDRD: ABNORMAL ML/MIN/{1.73_M2}
GFR SERPL CREATININE-BSD FRML MDRD: ABNORMAL ML/MIN/{1.73_M2}
GLUCOSE BLD-MCNC: 115 MG/DL (ref 70–99)
HAV IGM SER IA-ACNC: NONREACTIVE
HCT VFR BLD CALC: 40.1 % (ref 41–53)
HEMOGLOBIN: 13.5 G/DL (ref 13.5–17.5)
HEPATITIS B CORE IGM ANTIBODY: NONREACTIVE
HEPATITIS B SURFACE ANTIGEN: NONREACTIVE
HEPATITIS C ANTIBODY: NONREACTIVE
HIV AG/AB: REACTIVE
IMMATURE GRANULOCYTES: ABNORMAL %
LACTATE DEHYDROGENASE: 179 U/L (ref 135–225)
LYMPHOCYTES # BLD: 68 % (ref 24–44)
MCH RBC QN AUTO: 27.3 PG (ref 26–34)
MCHC RBC AUTO-ENTMCNC: 33.7 G/DL (ref 31–37)
MCV RBC AUTO: 81 FL (ref 80–100)
MONOCYTES # BLD: 20 % (ref 1–7)
MORPHOLOGY: ABNORMAL
NRBC AUTOMATED: ABNORMAL PER 100 WBC
PATHOLOGIST REVIEW: NORMAL
PDW BLD-RTO: 16.9 % (ref 11.5–14.9)
PLATELET # BLD: 126 K/UL (ref 150–450)
PLATELET ESTIMATE: ABNORMAL
PMV BLD AUTO: 8.1 FL (ref 6–12)
POTASSIUM SERPL-SCNC: 4 MMOL/L (ref 3.7–5.3)
RBC # BLD: 4.95 M/UL (ref 4.5–5.9)
RBC # BLD: ABNORMAL 10*6/UL
SEG NEUTROPHILS: 5 % (ref 36–66)
SEGMENTED NEUTROPHILS ABSOLUTE COUNT: 0.07 K/UL (ref 1.3–9.1)
SODIUM BLD-SCNC: 137 MMOL/L (ref 135–144)
TOTAL PROTEIN: 7.4 G/DL (ref 6.4–8.3)
WBC # BLD: 1.4 K/UL (ref 3.5–11)
WBC # BLD: ABNORMAL 10*3/UL

## 2018-10-25 PROCEDURE — 83615 LACTATE (LD) (LDH) ENZYME: CPT

## 2018-10-25 PROCEDURE — 36415 COLL VENOUS BLD VENIPUNCTURE: CPT

## 2018-10-25 PROCEDURE — 87389 HIV-1 AG W/HIV-1&-2 AB AG IA: CPT

## 2018-10-25 PROCEDURE — 80053 COMPREHEN METABOLIC PANEL: CPT

## 2018-10-25 PROCEDURE — 80074 ACUTE HEPATITIS PANEL: CPT

## 2018-10-25 PROCEDURE — 87536 HIV-1 QUANT&REVRSE TRNSCRPJ: CPT

## 2018-10-25 PROCEDURE — 86702 HIV-2 ANTIBODY: CPT

## 2018-10-25 PROCEDURE — 86701 HIV-1ANTIBODY: CPT

## 2018-10-25 PROCEDURE — 85025 COMPLETE CBC W/AUTO DIFF WBC: CPT

## 2018-10-25 NOTE — TELEPHONE ENCOUNTER
[facility] lab called with critical wbc 1.4 and anc 0.07. ANC improved from last draw. Notified Dr. Jimena Castro; no orders given. He did request that pt's appt be moved from 0820 to 1120 on Nov 1. Pt agreeable and will come in at 1120. Notified  to change the time. Discussed neutropenic precautions with pt and advised to go to er right away if fever or other s/s of infection occur. Pt verbalized understanding. Pt states he is afebrile at this time and asymptomatic.

## 2018-10-27 LAB
HIV INTERPRETATION: NORMAL
HIV-1 ANTIBODY: NEGATIVE
HIV-1/HIV-2 ANTIBODY DIFFERENTATION: NORMAL
HIV-2 AB: NEGATIVE

## 2018-10-29 LAB
HIV-1 COPIES/ML: <20 CPY/ML
HIV-1 RNA BY PCR, QN: <1.3 LOG
INTERPRETATION:: NOT DETECTED

## 2018-11-01 ENCOUNTER — OFFICE VISIT (OUTPATIENT)
Dept: ONCOLOGY | Age: 68
End: 2018-11-01
Payer: MEDICARE

## 2018-11-01 ENCOUNTER — TELEPHONE (OUTPATIENT)
Dept: ONCOLOGY | Age: 68
End: 2018-11-01

## 2018-11-01 VITALS — TEMPERATURE: 98 F | HEART RATE: 72 BPM | DIASTOLIC BLOOD PRESSURE: 84 MMHG | SYSTOLIC BLOOD PRESSURE: 154 MMHG

## 2018-11-01 DIAGNOSIS — D61.818 PANCYTOPENIA (HCC): ICD-10-CM

## 2018-11-01 DIAGNOSIS — D47.9 LYMPHOPROLIFERATIVE DISORDER (HCC): ICD-10-CM

## 2018-11-01 DIAGNOSIS — C88.9: Primary | ICD-10-CM

## 2018-11-01 PROCEDURE — G8484 FLU IMMUNIZE NO ADMIN: HCPCS | Performed by: INTERNAL MEDICINE

## 2018-11-01 PROCEDURE — 1123F ACP DISCUSS/DSCN MKR DOCD: CPT | Performed by: INTERNAL MEDICINE

## 2018-11-01 PROCEDURE — 1036F TOBACCO NON-USER: CPT | Performed by: INTERNAL MEDICINE

## 2018-11-01 PROCEDURE — 99214 OFFICE O/P EST MOD 30 MIN: CPT | Performed by: INTERNAL MEDICINE

## 2018-11-01 PROCEDURE — G8417 CALC BMI ABV UP PARAM F/U: HCPCS | Performed by: INTERNAL MEDICINE

## 2018-11-01 PROCEDURE — 4040F PNEUMOC VAC/ADMIN/RCVD: CPT | Performed by: INTERNAL MEDICINE

## 2018-11-01 PROCEDURE — 1101F PT FALLS ASSESS-DOCD LE1/YR: CPT | Performed by: INTERNAL MEDICINE

## 2018-11-01 PROCEDURE — G8427 DOCREV CUR MEDS BY ELIG CLIN: HCPCS | Performed by: INTERNAL MEDICINE

## 2018-11-01 PROCEDURE — 99211 OFF/OP EST MAY X REQ PHY/QHP: CPT | Performed by: INTERNAL MEDICINE

## 2018-11-01 PROCEDURE — 3017F COLORECTAL CA SCREEN DOC REV: CPT | Performed by: INTERNAL MEDICINE

## 2018-11-01 NOTE — PROGRESS NOTES
ONE-HALF TABLETS 120 tablet 1    metFORMIN (GLUCOPHAGE) 500 MG tablet Take 1 tablet by mouth 2 times daily (with meals) 180 tablet 1    omeprazole (PRILOSEC) 40 MG delayed release capsule Take 1 capsule by mouth daily 30 capsule 3    ferrous sulfate 325 (65 FE) MG tablet Take 325 mg by mouth daily (with breakfast)      atorvastatin (LIPITOR) 10 MG tablet Take 1 tablet by mouth daily 90 tablet 3     No current facility-administered medications for this visit. REVIEW OF SYSTEM:     Constitutional: No fever or chills. No night sweats, no weight loss, some fatigue late during the day  Eyes: No eye discharge, double vision, or eye pain   HEENT: negative for sore mouth, sore throat, hoarseness and voice change   Respiratory: negative for cough , sputum, dyspnea, wheezing, hemoptysis, chest pain   Cardiovascular: negative for chest pain, dyspnea, palpitations, orthopnea, PND   Gastrointestinal: negative for nausea, vomiting, diarrhea, constipation, abdominal pain, Dysphagia, hematemesis and hematochezia, +rectal pain.   Genitourinary: negative for frequency, dysuria, nocturia, urinary incontinence, and hematuria   Integument: ++ Edematous macular skin rash over torso, upper and lower extremity  Hematologic/Lymphatic: negative for easy bruising, bleeding, lymphadenopathy, petechiae and swelling/edema   Endocrine: negative for heat or cold intolerance, tremor, weight changes, change in bowel habits and hair loss   Musculoskeletal: negative for myalgias, arthralgias, pain, joint swelling,and bone pain   Neurological: negative for headaches, dizziness, seizures, weakness, numbness     OBJECTIVE:         Vitals:    11/01/18 1150   BP: (!) 154/84   Pulse: 72   Temp:       PHYSICAL EXAM:   General appearance - well appearing, no in pain or distress   Mental status - alert and cooperative   Eyes - pupils equal and reactive, extraocular eye movements intact   Ears - bilateral TM's and external ear canals normal   Mouth - not have clinical B symptoms and his hemoglobin has improved now. At this point he is not clinically different than what he was 5 years ago. I will send him to [facility] for second opinion. I discussed the option of observation at this point. Patient's questions were sought and answered to his satisfaction. He agreed with the plan. PLAN:   We will refer him to [facility]  Continue to monitor without treatment At this point    I spent more than 25 minutes examining, evaluating, reviewing data and counseling the patient. Greater than 50% of that time was spent face-to-face with the patient in counseling and coordinating her care.     Ki Narayanan MD  Hematology/Oncology    CC: Sonny Agosto, MELISSA - CNP

## 2018-11-06 ENCOUNTER — TELEPHONE (OUTPATIENT)
Dept: ONCOLOGY | Age: 68
End: 2018-11-06

## 2018-11-12 ENCOUNTER — HOSPITAL ENCOUNTER (OUTPATIENT)
Age: 68
Setting detail: SPECIMEN
Discharge: HOME | End: 2018-11-12
Payer: MEDICARE

## 2018-11-12 DIAGNOSIS — E11.9 TYPE 2 DIABETES MELLITUS WITHOUT COMPLICATION, WITHOUT LONG-TERM CURRENT USE OF INSULIN (HCC): ICD-10-CM

## 2018-11-12 DIAGNOSIS — E03.9 HYPOTHYROIDISM, UNSPECIFIED TYPE: ICD-10-CM

## 2018-11-12 LAB
ALBUMIN SERPL-MCNC: 3.9 G/DL (ref 3.5–5.2)
ALBUMIN/GLOBULIN RATIO: 1.1 (ref 1–2.5)
ALP BLD-CCNC: 92 U/L (ref 40–129)
ALT SERPL-CCNC: 14 U/L (ref 5–41)
ANION GAP SERPL CALCULATED.3IONS-SCNC: 14 MMOL/L (ref 9–17)
AST SERPL-CCNC: 16 U/L
BILIRUB SERPL-MCNC: 0.33 MG/DL (ref 0.3–1.2)
BUN BLDV-MCNC: 22 MG/DL (ref 8–23)
BUN/CREAT BLD: ABNORMAL (ref 9–20)
CALCIUM SERPL-MCNC: 9.5 MG/DL (ref 8.6–10.4)
CHLORIDE BLD-SCNC: 105 MMOL/L (ref 98–107)
CHOLESTEROL/HDL RATIO: 2.8
CHOLESTEROL: 138 MG/DL
CO2: 26 MMOL/L (ref 20–31)
CREAT SERPL-MCNC: 0.87 MG/DL (ref 0.7–1.2)
CREATININE URINE: 85.5 MG/DL (ref 39–259)
ESTIMATED AVERAGE GLUCOSE: 91 MG/DL
GFR AFRICAN AMERICAN: >60 ML/MIN
GFR NON-AFRICAN AMERICAN: >60 ML/MIN
GFR SERPL CREATININE-BSD FRML MDRD: ABNORMAL ML/MIN/{1.73_M2}
GFR SERPL CREATININE-BSD FRML MDRD: ABNORMAL ML/MIN/{1.73_M2}
GLUCOSE BLD-MCNC: 102 MG/DL (ref 70–99)
HBA1C MFR BLD: 4.8 % (ref 4–6)
HDLC SERPL-MCNC: 50 MG/DL
LDL CHOLESTEROL: 71 MG/DL (ref 0–130)
MICROALBUMIN/CREAT 24H UR: <12 MG/L
MICROALBUMIN/CREAT UR-RTO: NORMAL MCG/MG CREAT
POTASSIUM SERPL-SCNC: 4.3 MMOL/L (ref 3.7–5.3)
SODIUM BLD-SCNC: 145 MMOL/L (ref 135–144)
TOTAL PROTEIN: 7.4 G/DL (ref 6.4–8.3)
TRIGL SERPL-MCNC: 83 MG/DL
TSH SERPL DL<=0.05 MIU/L-ACNC: 2.03 MIU/L (ref 0.3–5)
VLDLC SERPL CALC-MCNC: NORMAL MG/DL (ref 1–30)

## 2018-11-15 LAB — BONE MARROW REPORT: NORMAL

## 2018-11-26 ENCOUNTER — HOSPITAL ENCOUNTER (OUTPATIENT)
Age: 68
Setting detail: SPECIMEN
Discharge: HOME | End: 2018-11-26
Payer: MEDICARE

## 2018-11-26 LAB — PROSTATE SPECIFIC ANTIGEN: 1.47 UG/L

## 2019-01-03 ENCOUNTER — OFFICE VISIT (OUTPATIENT)
Dept: ONCOLOGY | Age: 69
End: 2019-01-03
Payer: MEDICARE

## 2019-01-03 ENCOUNTER — TELEPHONE (OUTPATIENT)
Dept: ONCOLOGY | Age: 69
End: 2019-01-03

## 2019-01-03 VITALS
HEART RATE: 68 BPM | DIASTOLIC BLOOD PRESSURE: 85 MMHG | SYSTOLIC BLOOD PRESSURE: 145 MMHG | TEMPERATURE: 98.1 F | WEIGHT: 189.5 LBS | BODY MASS INDEX: 31.53 KG/M2

## 2019-01-03 DIAGNOSIS — C91.10 CHRONIC LARGE GRANULAR LYMPHOCYTIC LEUKEMIA (HCC): Primary | ICD-10-CM

## 2019-01-03 DIAGNOSIS — D75.89 BONE MARROW HYPERPLASIA: ICD-10-CM

## 2019-01-03 PROCEDURE — G8427 DOCREV CUR MEDS BY ELIG CLIN: HCPCS | Performed by: INTERNAL MEDICINE

## 2019-01-03 PROCEDURE — 99215 OFFICE O/P EST HI 40 MIN: CPT | Performed by: INTERNAL MEDICINE

## 2019-01-03 PROCEDURE — 1123F ACP DISCUSS/DSCN MKR DOCD: CPT | Performed by: INTERNAL MEDICINE

## 2019-01-03 PROCEDURE — G8417 CALC BMI ABV UP PARAM F/U: HCPCS | Performed by: INTERNAL MEDICINE

## 2019-01-03 PROCEDURE — 99211 OFF/OP EST MAY X REQ PHY/QHP: CPT | Performed by: INTERNAL MEDICINE

## 2019-01-03 PROCEDURE — 4040F PNEUMOC VAC/ADMIN/RCVD: CPT | Performed by: INTERNAL MEDICINE

## 2019-01-03 PROCEDURE — 1036F TOBACCO NON-USER: CPT | Performed by: INTERNAL MEDICINE

## 2019-01-03 PROCEDURE — G8484 FLU IMMUNIZE NO ADMIN: HCPCS | Performed by: INTERNAL MEDICINE

## 2019-01-03 PROCEDURE — 3017F COLORECTAL CA SCREEN DOC REV: CPT | Performed by: INTERNAL MEDICINE

## 2019-01-03 PROCEDURE — 1101F PT FALLS ASSESS-DOCD LE1/YR: CPT | Performed by: INTERNAL MEDICINE

## 2019-01-03 RX ORDER — PREDNISONE 10 MG/1
TABLET ORAL
Qty: 105 TABLET | Refills: 0 | Status: SHIPPED | OUTPATIENT
Start: 2019-01-03 | End: 2019-02-21 | Stop reason: ALTCHOICE

## 2019-01-07 DIAGNOSIS — C91.10 CHRONIC LARGE GRANULAR LYMPHOCYTIC LEUKEMIA (HCC): ICD-10-CM

## 2019-01-07 RX ORDER — METHOTREXATE 2.5 MG/1
20 TABLET ORAL WEEKLY
Qty: 32 TABLET | Refills: 1 | Status: SHIPPED | OUTPATIENT
Start: 2019-01-07 | End: 2019-03-01 | Stop reason: SDUPTHER

## 2019-01-09 ENCOUNTER — TELEPHONE (OUTPATIENT)
Dept: CASE MANAGEMENT | Age: 69
End: 2019-01-09

## 2019-01-10 ENCOUNTER — OFFICE VISIT (OUTPATIENT)
Dept: ONCOLOGY | Age: 69
End: 2019-01-10
Payer: MEDICARE

## 2019-01-10 ENCOUNTER — HOSPITAL ENCOUNTER (OUTPATIENT)
Age: 69
Discharge: HOME | End: 2019-01-10
Payer: MEDICARE

## 2019-01-10 ENCOUNTER — TELEPHONE (OUTPATIENT)
Dept: ONCOLOGY | Age: 69
End: 2019-01-10

## 2019-01-10 DIAGNOSIS — C84.90 MATURE NK/T-CELL LYMPHOMA, UNSPECIFIED BODY REGION, UNSPECIFIED MATURE NK/T-CELL LYMPHOMA TYPE (HCC): ICD-10-CM

## 2019-01-10 DIAGNOSIS — D47.9 LYMPHOPROLIFERATIVE DISORDER (HCC): Primary | ICD-10-CM

## 2019-01-10 DIAGNOSIS — C91.10 CHRONIC LARGE GRANULAR LYMPHOCYTIC LEUKEMIA (HCC): ICD-10-CM

## 2019-01-10 LAB
ABSOLUTE EOS #: 0.04 K/UL (ref 0–0.4)
ABSOLUTE IMMATURE GRANULOCYTE: ABNORMAL K/UL (ref 0–0.3)
ABSOLUTE LYMPH #: 1.15 K/UL (ref 1–4.8)
ABSOLUTE MONO #: 0.41 K/UL (ref 0.1–0.8)
BASOPHILS # BLD: 1 % (ref 0–2)
BASOPHILS ABSOLUTE: 0.02 K/UL (ref 0–0.2)
DIFFERENTIAL TYPE: ABNORMAL
EOSINOPHILS RELATIVE PERCENT: 2 % (ref 1–4)
HCT VFR BLD CALC: 39.5 % (ref 41–53)
HEMOGLOBIN: 13.4 G/DL (ref 13.5–17.5)
IMMATURE GRANULOCYTES: ABNORMAL %
LYMPHOCYTES # BLD: 64 % (ref 24–44)
MCH RBC QN AUTO: 28.4 PG (ref 26–34)
MCHC RBC AUTO-ENTMCNC: 34 G/DL (ref 31–37)
MCV RBC AUTO: 83.5 FL (ref 80–100)
MONOCYTES # BLD: 23 % (ref 1–7)
MORPHOLOGY: NORMAL
NRBC AUTOMATED: ABNORMAL PER 100 WBC
PDW BLD-RTO: 15.1 % (ref 12.5–15.4)
PLATELET # BLD: 134 K/UL (ref 140–450)
PLATELET ESTIMATE: ABNORMAL
PMV BLD AUTO: 8 FL (ref 6–12)
RBC # BLD: 4.73 M/UL (ref 4.5–5.9)
RBC # BLD: ABNORMAL 10*6/UL
SEG NEUTROPHILS: 10 % (ref 36–66)
SEGMENTED NEUTROPHILS ABSOLUTE COUNT: 0.18 K/UL (ref 1.8–7.7)
WBC # BLD: 1.8 K/UL (ref 3.5–11)
WBC # BLD: ABNORMAL 10*3/UL

## 2019-01-10 PROCEDURE — 99999 PR OFFICE/OUTPT VISIT,PROCEDURE ONLY: CPT | Performed by: INTERNAL MEDICINE

## 2019-01-10 PROCEDURE — 85025 COMPLETE CBC W/AUTO DIFF WBC: CPT

## 2019-01-10 PROCEDURE — 36415 COLL VENOUS BLD VENIPUNCTURE: CPT

## 2019-01-21 ENCOUNTER — TELEPHONE (OUTPATIENT)
Dept: CASE MANAGEMENT | Age: 69
End: 2019-01-21

## 2019-01-21 ENCOUNTER — HOSPITAL ENCOUNTER (OUTPATIENT)
Age: 69
Setting detail: SPECIMEN
Discharge: HOME | End: 2019-01-21
Payer: MEDICARE

## 2019-01-21 DIAGNOSIS — C91.10 CHRONIC LARGE GRANULAR LYMPHOCYTIC LEUKEMIA (HCC): ICD-10-CM

## 2019-01-21 LAB
ABSOLUTE EOS #: <0.03 K/UL (ref 0–0.44)
ABSOLUTE IMMATURE GRANULOCYTE: <0.03 K/UL (ref 0–0.3)
ABSOLUTE LYMPH #: 1.19 K/UL (ref 1.1–3.7)
ABSOLUTE MONO #: 0.49 K/UL (ref 0.1–1.2)
BASOPHILS # BLD: 0 % (ref 0–2)
BASOPHILS ABSOLUTE: <0.03 K/UL (ref 0–0.2)
DIFFERENTIAL TYPE: ABNORMAL
EOSINOPHILS RELATIVE PERCENT: 0 % (ref 1–4)
HCT VFR BLD CALC: 44.9 % (ref 40.7–50.3)
HEMOGLOBIN: 14.9 G/DL (ref 13–17)
IMMATURE GRANULOCYTES: 0 %
LYMPHOCYTES # BLD: 29 % (ref 24–43)
MCH RBC QN AUTO: 29.2 PG (ref 25.2–33.5)
MCHC RBC AUTO-ENTMCNC: 33.2 G/DL (ref 28.4–34.8)
MCV RBC AUTO: 87.9 FL (ref 82.6–102.9)
MONOCYTES # BLD: 12 % (ref 3–12)
NRBC AUTOMATED: 0 PER 100 WBC
PDW BLD-RTO: 15.7 % (ref 11.8–14.4)
PLATELET # BLD: 157 K/UL (ref 138–453)
PLATELET ESTIMATE: ABNORMAL
PMV BLD AUTO: 10.6 FL (ref 8.1–13.5)
RBC # BLD: 5.11 M/UL (ref 4.21–5.77)
RBC # BLD: ABNORMAL 10*6/UL
SEG NEUTROPHILS: 59 % (ref 36–65)
SEGMENTED NEUTROPHILS ABSOLUTE COUNT: 2.44 K/UL (ref 1.5–8.1)
WBC # BLD: 4.1 K/UL (ref 3.5–11.3)
WBC # BLD: ABNORMAL 10*3/UL

## 2019-01-24 ENCOUNTER — OFFICE VISIT (OUTPATIENT)
Dept: ONCOLOGY | Age: 69
End: 2019-01-24
Payer: MEDICARE

## 2019-01-24 VITALS
DIASTOLIC BLOOD PRESSURE: 80 MMHG | HEART RATE: 70 BPM | SYSTOLIC BLOOD PRESSURE: 125 MMHG | WEIGHT: 187.4 LBS | TEMPERATURE: 97.7 F | BODY MASS INDEX: 31.18 KG/M2

## 2019-01-24 DIAGNOSIS — C84.90 MATURE NK/T-CELL LYMPHOMA, UNSPECIFIED BODY REGION, UNSPECIFIED MATURE NK/T-CELL LYMPHOMA TYPE (HCC): Primary | ICD-10-CM

## 2019-01-24 DIAGNOSIS — E61.1 IRON DEFICIENCY: ICD-10-CM

## 2019-01-24 PROCEDURE — 99211 OFF/OP EST MAY X REQ PHY/QHP: CPT | Performed by: INTERNAL MEDICINE

## 2019-01-24 PROCEDURE — G8417 CALC BMI ABV UP PARAM F/U: HCPCS | Performed by: INTERNAL MEDICINE

## 2019-01-24 PROCEDURE — 1123F ACP DISCUSS/DSCN MKR DOCD: CPT | Performed by: INTERNAL MEDICINE

## 2019-01-24 PROCEDURE — G8484 FLU IMMUNIZE NO ADMIN: HCPCS | Performed by: INTERNAL MEDICINE

## 2019-01-24 PROCEDURE — 1101F PT FALLS ASSESS-DOCD LE1/YR: CPT | Performed by: INTERNAL MEDICINE

## 2019-01-24 PROCEDURE — 99215 OFFICE O/P EST HI 40 MIN: CPT | Performed by: INTERNAL MEDICINE

## 2019-01-24 PROCEDURE — 3017F COLORECTAL CA SCREEN DOC REV: CPT | Performed by: INTERNAL MEDICINE

## 2019-01-24 PROCEDURE — G8427 DOCREV CUR MEDS BY ELIG CLIN: HCPCS | Performed by: INTERNAL MEDICINE

## 2019-01-24 PROCEDURE — 4040F PNEUMOC VAC/ADMIN/RCVD: CPT | Performed by: INTERNAL MEDICINE

## 2019-01-24 PROCEDURE — 1036F TOBACCO NON-USER: CPT | Performed by: INTERNAL MEDICINE

## 2019-01-28 ENCOUNTER — HOSPITAL ENCOUNTER (OUTPATIENT)
Age: 69
Setting detail: SPECIMEN
Discharge: HOME | End: 2019-01-28
Payer: MEDICARE

## 2019-01-28 DIAGNOSIS — C91.10 CHRONIC LARGE GRANULAR LYMPHOCYTIC LEUKEMIA (HCC): ICD-10-CM

## 2019-01-28 LAB
ABSOLUTE EOS #: <0.03 K/UL (ref 0–0.44)
ABSOLUTE IMMATURE GRANULOCYTE: <0.03 K/UL (ref 0–0.3)
ABSOLUTE LYMPH #: 1.89 K/UL (ref 1.1–3.7)
ABSOLUTE MONO #: 0.42 K/UL (ref 0.1–1.2)
BASOPHILS # BLD: 0 % (ref 0–2)
BASOPHILS ABSOLUTE: <0.03 K/UL (ref 0–0.2)
DIFFERENTIAL TYPE: ABNORMAL
EOSINOPHILS RELATIVE PERCENT: 0 % (ref 1–4)
HCT VFR BLD CALC: 43.3 % (ref 40.7–50.3)
HEMOGLOBIN: 14.1 G/DL (ref 13–17)
IMMATURE GRANULOCYTES: 0 %
LYMPHOCYTES # BLD: 34 % (ref 24–43)
MCH RBC QN AUTO: 29.2 PG (ref 25.2–33.5)
MCHC RBC AUTO-ENTMCNC: 32.6 G/DL (ref 28.4–34.8)
MCV RBC AUTO: 89.6 FL (ref 82.6–102.9)
MONOCYTES # BLD: 8 % (ref 3–12)
NRBC AUTOMATED: 0 PER 100 WBC
PDW BLD-RTO: 15.5 % (ref 11.8–14.4)
PLATELET # BLD: 116 K/UL (ref 138–453)
PLATELET ESTIMATE: ABNORMAL
PMV BLD AUTO: 10.9 FL (ref 8.1–13.5)
RBC # BLD: 4.83 M/UL (ref 4.21–5.77)
RBC # BLD: ABNORMAL 10*6/UL
SEG NEUTROPHILS: 58 % (ref 36–65)
SEGMENTED NEUTROPHILS ABSOLUTE COUNT: 3.22 K/UL (ref 1.5–8.1)
WBC # BLD: 5.6 K/UL (ref 3.5–11.3)
WBC # BLD: ABNORMAL 10*3/UL

## 2019-01-29 ENCOUNTER — TELEPHONE (OUTPATIENT)
Dept: CASE MANAGEMENT | Age: 69
End: 2019-01-29

## 2019-02-04 ENCOUNTER — HOSPITAL ENCOUNTER (OUTPATIENT)
Age: 69
Setting detail: SPECIMEN
Discharge: HOME | End: 2019-02-04
Payer: MEDICARE

## 2019-02-04 DIAGNOSIS — C91.10 CHRONIC LARGE GRANULAR LYMPHOCYTIC LEUKEMIA (HCC): ICD-10-CM

## 2019-02-04 LAB
ABSOLUTE EOS #: 0.04 K/UL (ref 0–0.44)
ABSOLUTE IMMATURE GRANULOCYTE: <0.03 K/UL (ref 0–0.3)
ABSOLUTE LYMPH #: 2.01 K/UL (ref 1.1–3.7)
ABSOLUTE MONO #: 0.57 K/UL (ref 0.1–1.2)
BASOPHILS # BLD: 0 % (ref 0–2)
BASOPHILS ABSOLUTE: <0.03 K/UL (ref 0–0.2)
DIFFERENTIAL TYPE: ABNORMAL
EOSINOPHILS RELATIVE PERCENT: 1 % (ref 1–4)
HCT VFR BLD CALC: 45.8 % (ref 40.7–50.3)
HEMOGLOBIN: 15.2 G/DL (ref 13–17)
IMMATURE GRANULOCYTES: 0 %
LYMPHOCYTES # BLD: 34 % (ref 24–43)
MCH RBC QN AUTO: 29.2 PG (ref 25.2–33.5)
MCHC RBC AUTO-ENTMCNC: 33.2 G/DL (ref 28.4–34.8)
MCV RBC AUTO: 87.9 FL (ref 82.6–102.9)
MONOCYTES # BLD: 10 % (ref 3–12)
NRBC AUTOMATED: 0 PER 100 WBC
PDW BLD-RTO: 15.5 % (ref 11.8–14.4)
PLATELET # BLD: 120 K/UL (ref 138–453)
PLATELET ESTIMATE: ABNORMAL
PMV BLD AUTO: 10.9 FL (ref 8.1–13.5)
RBC # BLD: 5.21 M/UL (ref 4.21–5.77)
RBC # BLD: ABNORMAL 10*6/UL
SEG NEUTROPHILS: 55 % (ref 36–65)
SEGMENTED NEUTROPHILS ABSOLUTE COUNT: 3.2 K/UL (ref 1.5–8.1)
WBC # BLD: 5.9 K/UL (ref 3.5–11.3)
WBC # BLD: ABNORMAL 10*3/UL

## 2019-02-11 ENCOUNTER — HOSPITAL ENCOUNTER (OUTPATIENT)
Age: 69
Setting detail: SPECIMEN
Discharge: HOME | End: 2019-02-11
Payer: MEDICARE

## 2019-02-11 ENCOUNTER — TELEPHONE (OUTPATIENT)
Dept: ONCOLOGY | Age: 69
End: 2019-02-11

## 2019-02-11 DIAGNOSIS — C91.10 CHRONIC LARGE GRANULAR LYMPHOCYTIC LEUKEMIA (HCC): ICD-10-CM

## 2019-02-11 LAB
ABSOLUTE EOS #: 0.05 K/UL (ref 0–0.44)
ABSOLUTE IMMATURE GRANULOCYTE: <0.03 K/UL (ref 0–0.3)
ABSOLUTE LYMPH #: 1.88 K/UL (ref 1.1–3.7)
ABSOLUTE MONO #: 0.6 K/UL (ref 0.1–1.2)
BASOPHILS # BLD: 1 % (ref 0–2)
BASOPHILS ABSOLUTE: 0.03 K/UL (ref 0–0.2)
DIFFERENTIAL TYPE: ABNORMAL
EOSINOPHILS RELATIVE PERCENT: 1 % (ref 1–4)
HCT VFR BLD CALC: 44.9 % (ref 40.7–50.3)
HEMOGLOBIN: 14.9 G/DL (ref 13–17)
IMMATURE GRANULOCYTES: 0 %
LYMPHOCYTES # BLD: 40 % (ref 24–43)
MCH RBC QN AUTO: 29.3 PG (ref 25.2–33.5)
MCHC RBC AUTO-ENTMCNC: 33.2 G/DL (ref 28.4–34.8)
MCV RBC AUTO: 88.4 FL (ref 82.6–102.9)
MONOCYTES # BLD: 13 % (ref 3–12)
NRBC AUTOMATED: 0 PER 100 WBC
PDW BLD-RTO: 15.4 % (ref 11.8–14.4)
PLATELET # BLD: 123 K/UL (ref 138–453)
PLATELET ESTIMATE: ABNORMAL
PMV BLD AUTO: 10.8 FL (ref 8.1–13.5)
RBC # BLD: 5.08 M/UL (ref 4.21–5.77)
RBC # BLD: ABNORMAL 10*6/UL
SEG NEUTROPHILS: 45 % (ref 36–65)
SEGMENTED NEUTROPHILS ABSOLUTE COUNT: 2.09 K/UL (ref 1.5–8.1)
WBC # BLD: 4.7 K/UL (ref 3.5–11.3)
WBC # BLD: ABNORMAL 10*3/UL

## 2019-02-18 ENCOUNTER — HOSPITAL ENCOUNTER (OUTPATIENT)
Age: 69
Setting detail: SPECIMEN
Discharge: HOME | End: 2019-02-18
Payer: MEDICARE

## 2019-02-18 DIAGNOSIS — C91.10 CHRONIC LARGE GRANULAR LYMPHOCYTIC LEUKEMIA (HCC): ICD-10-CM

## 2019-02-18 LAB
ABSOLUTE EOS #: <0.03 K/UL (ref 0–0.44)
ABSOLUTE IMMATURE GRANULOCYTE: <0.03 K/UL (ref 0–0.3)
ABSOLUTE LYMPH #: 1.11 K/UL (ref 1.1–3.7)
ABSOLUTE MONO #: 0.5 K/UL (ref 0.1–1.2)
BASOPHILS # BLD: 1 % (ref 0–2)
BASOPHILS ABSOLUTE: 0.03 K/UL (ref 0–0.2)
DIFFERENTIAL TYPE: ABNORMAL
EOSINOPHILS RELATIVE PERCENT: 1 % (ref 1–4)
HCT VFR BLD CALC: 46.1 % (ref 40.7–50.3)
HEMOGLOBIN: 15.3 G/DL (ref 13–17)
IMMATURE GRANULOCYTES: 0 %
LYMPHOCYTES # BLD: 34 % (ref 24–43)
MCH RBC QN AUTO: 30.1 PG (ref 25.2–33.5)
MCHC RBC AUTO-ENTMCNC: 33.2 G/DL (ref 28.4–34.8)
MCV RBC AUTO: 90.7 FL (ref 82.6–102.9)
MONOCYTES # BLD: 15 % (ref 3–12)
NRBC AUTOMATED: 0 PER 100 WBC
PDW BLD-RTO: 15.7 % (ref 11.8–14.4)
PLATELET # BLD: 133 K/UL (ref 138–453)
PLATELET ESTIMATE: ABNORMAL
PMV BLD AUTO: 10.9 FL (ref 8.1–13.5)
RBC # BLD: 5.08 M/UL (ref 4.21–5.77)
RBC # BLD: ABNORMAL 10*6/UL
SEG NEUTROPHILS: 49 % (ref 36–65)
SEGMENTED NEUTROPHILS ABSOLUTE COUNT: 1.58 K/UL (ref 1.5–8.1)
WBC # BLD: 3.2 K/UL (ref 3.5–11.3)
WBC # BLD: ABNORMAL 10*3/UL

## 2019-02-21 ENCOUNTER — OFFICE VISIT (OUTPATIENT)
Dept: ONCOLOGY | Age: 69
End: 2019-02-21
Payer: MEDICARE

## 2019-02-21 VITALS
BODY MASS INDEX: 31.77 KG/M2 | WEIGHT: 190.9 LBS | DIASTOLIC BLOOD PRESSURE: 84 MMHG | TEMPERATURE: 98 F | SYSTOLIC BLOOD PRESSURE: 130 MMHG | HEART RATE: 83 BPM

## 2019-02-21 DIAGNOSIS — D75.89 BONE MARROW HYPERPLASIA: ICD-10-CM

## 2019-02-21 PROCEDURE — 1036F TOBACCO NON-USER: CPT | Performed by: INTERNAL MEDICINE

## 2019-02-21 PROCEDURE — 1101F PT FALLS ASSESS-DOCD LE1/YR: CPT | Performed by: INTERNAL MEDICINE

## 2019-02-21 PROCEDURE — 4040F PNEUMOC VAC/ADMIN/RCVD: CPT | Performed by: INTERNAL MEDICINE

## 2019-02-21 PROCEDURE — G8427 DOCREV CUR MEDS BY ELIG CLIN: HCPCS | Performed by: INTERNAL MEDICINE

## 2019-02-21 PROCEDURE — 3017F COLORECTAL CA SCREEN DOC REV: CPT | Performed by: INTERNAL MEDICINE

## 2019-02-21 PROCEDURE — G8417 CALC BMI ABV UP PARAM F/U: HCPCS | Performed by: INTERNAL MEDICINE

## 2019-02-21 PROCEDURE — G8484 FLU IMMUNIZE NO ADMIN: HCPCS | Performed by: INTERNAL MEDICINE

## 2019-02-21 PROCEDURE — 99211 OFF/OP EST MAY X REQ PHY/QHP: CPT | Performed by: INTERNAL MEDICINE

## 2019-02-21 PROCEDURE — 99215 OFFICE O/P EST HI 40 MIN: CPT | Performed by: INTERNAL MEDICINE

## 2019-02-21 PROCEDURE — 1123F ACP DISCUSS/DSCN MKR DOCD: CPT | Performed by: INTERNAL MEDICINE

## 2019-03-01 DIAGNOSIS — C91.10 CHRONIC LARGE GRANULAR LYMPHOCYTIC LEUKEMIA (HCC): ICD-10-CM

## 2019-03-01 RX ORDER — METHOTREXATE 2.5 MG/1
20 TABLET ORAL WEEKLY
Qty: 32 TABLET | Refills: 1 | Status: SHIPPED | OUTPATIENT
Start: 2019-03-01 | End: 2019-04-27 | Stop reason: SDUPTHER

## 2019-03-04 ENCOUNTER — HOSPITAL ENCOUNTER (OUTPATIENT)
Age: 69
Setting detail: SPECIMEN
Discharge: HOME | End: 2019-03-04
Payer: MEDICARE

## 2019-03-04 DIAGNOSIS — D75.89 BONE MARROW HYPERPLASIA: ICD-10-CM

## 2019-03-04 LAB
ABSOLUTE EOS #: 0.06 K/UL (ref 0–0.44)
ABSOLUTE IMMATURE GRANULOCYTE: <0.03 K/UL (ref 0–0.3)
ABSOLUTE LYMPH #: 1.26 K/UL (ref 1.1–3.7)
ABSOLUTE MONO #: 0.52 K/UL (ref 0.1–1.2)
BASOPHILS # BLD: 1 % (ref 0–2)
BASOPHILS ABSOLUTE: <0.03 K/UL (ref 0–0.2)
DIFFERENTIAL TYPE: ABNORMAL
EOSINOPHILS RELATIVE PERCENT: 2 % (ref 1–4)
HCT VFR BLD CALC: 42 % (ref 40.7–50.3)
HEMOGLOBIN: 14.3 G/DL (ref 13–17)
IMMATURE GRANULOCYTES: 0 %
LYMPHOCYTES # BLD: 43 % (ref 24–43)
MCH RBC QN AUTO: 29.6 PG (ref 25.2–33.5)
MCHC RBC AUTO-ENTMCNC: 34 G/DL (ref 28.4–34.8)
MCV RBC AUTO: 87 FL (ref 82.6–102.9)
MONOCYTES # BLD: 18 % (ref 3–12)
NRBC AUTOMATED: 0 PER 100 WBC
PDW BLD-RTO: 15.7 % (ref 11.8–14.4)
PLATELET # BLD: 142 K/UL (ref 138–453)
PLATELET ESTIMATE: ABNORMAL
PMV BLD AUTO: 10.9 FL (ref 8.1–13.5)
RBC # BLD: 4.83 M/UL (ref 4.21–5.77)
RBC # BLD: ABNORMAL 10*6/UL
SEG NEUTROPHILS: 36 % (ref 36–65)
SEGMENTED NEUTROPHILS ABSOLUTE COUNT: 1.05 K/UL (ref 1.5–8.1)
WBC # BLD: 2.9 K/UL (ref 3.5–11.3)
WBC # BLD: ABNORMAL 10*3/UL

## 2019-03-18 ENCOUNTER — HOSPITAL ENCOUNTER (OUTPATIENT)
Age: 69
Setting detail: SPECIMEN
Discharge: HOME | End: 2019-03-18
Payer: MEDICARE

## 2019-03-18 DIAGNOSIS — D75.89 BONE MARROW HYPERPLASIA: ICD-10-CM

## 2019-03-18 LAB
ABSOLUTE EOS #: 0.06 K/UL (ref 0–0.44)
ABSOLUTE IMMATURE GRANULOCYTE: <0.03 K/UL (ref 0–0.3)
ABSOLUTE LYMPH #: 1.09 K/UL (ref 1.1–3.7)
ABSOLUTE MONO #: 0.56 K/UL (ref 0.1–1.2)
BASOPHILS # BLD: 1 % (ref 0–2)
BASOPHILS ABSOLUTE: <0.03 K/UL (ref 0–0.2)
DIFFERENTIAL TYPE: ABNORMAL
EOSINOPHILS RELATIVE PERCENT: 2 % (ref 1–4)
HCT VFR BLD CALC: 38.4 % (ref 40.7–50.3)
HEMOGLOBIN: 13.3 G/DL (ref 13–17)
IMMATURE GRANULOCYTES: 0 %
LYMPHOCYTES # BLD: 31 % (ref 24–43)
MCH RBC QN AUTO: 30.7 PG (ref 25.2–33.5)
MCHC RBC AUTO-ENTMCNC: 34.6 G/DL (ref 28.4–34.8)
MCV RBC AUTO: 88.7 FL (ref 82.6–102.9)
MONOCYTES # BLD: 16 % (ref 3–12)
NRBC AUTOMATED: 0 PER 100 WBC
PDW BLD-RTO: 16.9 % (ref 11.8–14.4)
PLATELET # BLD: 141 K/UL (ref 138–453)
PLATELET ESTIMATE: ABNORMAL
PMV BLD AUTO: 11.1 FL (ref 8.1–13.5)
RBC # BLD: 4.33 M/UL (ref 4.21–5.77)
RBC # BLD: ABNORMAL 10*6/UL
SEG NEUTROPHILS: 50 % (ref 36–65)
SEGMENTED NEUTROPHILS ABSOLUTE COUNT: 1.8 K/UL (ref 1.5–8.1)
WBC # BLD: 3.5 K/UL (ref 3.5–11.3)
WBC # BLD: ABNORMAL 10*3/UL

## 2019-04-01 ENCOUNTER — HOSPITAL ENCOUNTER (OUTPATIENT)
Age: 69
Setting detail: SPECIMEN
Discharge: HOME | End: 2019-04-01
Payer: MEDICARE

## 2019-04-01 DIAGNOSIS — D75.89 BONE MARROW HYPERPLASIA: ICD-10-CM

## 2019-04-01 LAB
ABSOLUTE EOS #: 0.09 K/UL (ref 0–0.44)
ABSOLUTE IMMATURE GRANULOCYTE: <0.03 K/UL (ref 0–0.3)
ABSOLUTE LYMPH #: 1.16 K/UL (ref 1.1–3.7)
ABSOLUTE MONO #: 0.68 K/UL (ref 0.1–1.2)
BASOPHILS # BLD: 1 % (ref 0–2)
BASOPHILS ABSOLUTE: <0.03 K/UL (ref 0–0.2)
DIFFERENTIAL TYPE: ABNORMAL
EOSINOPHILS RELATIVE PERCENT: 2 % (ref 1–4)
HCT VFR BLD CALC: 40.3 % (ref 40.7–50.3)
HEMOGLOBIN: 13.7 G/DL (ref 13–17)
IMMATURE GRANULOCYTES: 0 %
LYMPHOCYTES # BLD: 28 % (ref 24–43)
MCH RBC QN AUTO: 31.4 PG (ref 25.2–33.5)
MCHC RBC AUTO-ENTMCNC: 34 G/DL (ref 28.4–34.8)
MCV RBC AUTO: 92.2 FL (ref 82.6–102.9)
MONOCYTES # BLD: 16 % (ref 3–12)
NRBC AUTOMATED: 0 PER 100 WBC
PDW BLD-RTO: 17.3 % (ref 11.8–14.4)
PLATELET # BLD: 151 K/UL (ref 138–453)
PLATELET ESTIMATE: ABNORMAL
PMV BLD AUTO: 11.4 FL (ref 8.1–13.5)
RBC # BLD: 4.37 M/UL (ref 4.21–5.77)
RBC # BLD: ABNORMAL 10*6/UL
SEG NEUTROPHILS: 53 % (ref 36–65)
SEGMENTED NEUTROPHILS ABSOLUTE COUNT: 2.24 K/UL (ref 1.5–8.1)
WBC # BLD: 4.2 K/UL (ref 3.5–11.3)
WBC # BLD: ABNORMAL 10*3/UL

## 2019-04-02 ENCOUNTER — OFFICE VISIT (OUTPATIENT)
Dept: ONCOLOGY | Age: 69
End: 2019-04-02
Payer: MEDICARE

## 2019-04-02 VITALS
HEART RATE: 81 BPM | BODY MASS INDEX: 31.92 KG/M2 | SYSTOLIC BLOOD PRESSURE: 137 MMHG | DIASTOLIC BLOOD PRESSURE: 78 MMHG | TEMPERATURE: 97.6 F | WEIGHT: 191.8 LBS

## 2019-04-02 DIAGNOSIS — E61.1 IRON DEFICIENCY: ICD-10-CM

## 2019-04-02 DIAGNOSIS — C84.90 MATURE NK/T-CELL LYMPHOMA, UNSPECIFIED BODY REGION, UNSPECIFIED MATURE NK/T-CELL LYMPHOMA TYPE (HCC): Primary | ICD-10-CM

## 2019-04-02 PROCEDURE — G8417 CALC BMI ABV UP PARAM F/U: HCPCS | Performed by: INTERNAL MEDICINE

## 2019-04-02 PROCEDURE — G8427 DOCREV CUR MEDS BY ELIG CLIN: HCPCS | Performed by: INTERNAL MEDICINE

## 2019-04-02 PROCEDURE — 99211 OFF/OP EST MAY X REQ PHY/QHP: CPT | Performed by: INTERNAL MEDICINE

## 2019-04-02 PROCEDURE — 1123F ACP DISCUSS/DSCN MKR DOCD: CPT | Performed by: INTERNAL MEDICINE

## 2019-04-02 PROCEDURE — 1036F TOBACCO NON-USER: CPT | Performed by: INTERNAL MEDICINE

## 2019-04-02 PROCEDURE — 4040F PNEUMOC VAC/ADMIN/RCVD: CPT | Performed by: INTERNAL MEDICINE

## 2019-04-02 PROCEDURE — 99215 OFFICE O/P EST HI 40 MIN: CPT | Performed by: INTERNAL MEDICINE

## 2019-04-02 PROCEDURE — 3017F COLORECTAL CA SCREEN DOC REV: CPT | Performed by: INTERNAL MEDICINE

## 2019-04-02 NOTE — PROGRESS NOTES
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 He reports that he was seen by his PCP in October 2013, for routine follow up visit. His Lab work at that time revealed low WBC 3.0K. So his lab work was repeated by his PCP in  Jan 2014. Which again revealed WBC 2.6 and ANC of 0.5, so he was referred to our hematology clinic for further evaluation and recommendations. His WBC have been stable for last few months, however his iron levels were mildly low so I started him on PO iron. INTERVAL HISTORY:  Gaurav Washington is returning for follow up visit for his T cell LGL. His WBC are within normal limits now. He is tolerating methotrexate well. He denied any fever chills, night sweats. No bleeding symptoms, easy bruising. Prednisone completd last week    During this visit patient's allergy, social, medical, surgical history and medications were reviewed and updated.     Current Outpatient Medications   Medication Sig Dispense Refill    levothyroxine (SYNTHROID) 150 MCG tablet TAKE ONE TABLET BY MOUTH DAILY EXCEPT ON MONDAY AND THURSDAY TAKE ONE AND ONE-HALF TABLETS 120 tablet 1    hydrochlorothiazide (HYDRODIURIL) 25 MG tablet TAKE 1 TABLET BY MOUTH ONCE DAILY 90 tablet 1    enalapril (VASOTEC) 20 MG tablet TAKE 1 TABLET BY MOUTH TWICE DAILY 180 tablet 1    metFORMIN (GLUCOPHAGE) 500 MG tablet TAKE 1 TABLET BY MOUTH TWICE DAILY WITH MEALS 180 tablet 1    methotrexate (RHEUMATREX) 2.5 MG chemo tablet Take 8 tablets by mouth once a week 32 tablet 1    ONETOUCH DELICA LANCETS 27X MISC To test daily and PRN for hypogycemia 90 each 1    blood glucose test strips (ONE TOUCH ULTRA TEST) strip 1 each by Other route daily As needed. 90 strip 1     No current facility-administered medications for this visit. REVIEW OF SYSTEM:     Constitutional: No fever or chills. No night sweats, no weight loss, some fatigue late during the day  Eyes: No eye discharge, double vision, or eye pain   HEENT: negative for sore mouth, sore throat, hoarseness and voice change   Respiratory: negative for cough , sputum, dyspnea, wheezing, hemoptysis, chest pain   Cardiovascular: negative for chest pain, dyspnea, palpitations, orthopnea, PND   Gastrointestinal: negative for nausea, vomiting, diarrhea, constipation, abdominal pain, Dysphagia, hematemesis and hematochezia, +rectal pain.   Genitourinary: negative for frequency, dysuria, nocturia, urinary incontinence, and hematuria   Integument: ++ Edematous macular skin rash over torso, upper and lower extremity  Hematologic/Lymphatic: negative for easy bruising, bleeding, lymphadenopathy, petechiae and swelling/edema   Endocrine: negative for heat or cold intolerance, tremor, weight changes, change in bowel habits and hair loss   Musculoskeletal: negative for myalgias, arthralgias, pain, joint swelling,and bone pain   Neurological: negative for headaches, dizziness, seizures, weakness, numbness     OBJECTIVE:         Vitals:    04/02/19 0848   BP: 137/78   Pulse: 81   Temp: 97.6 °F (36.4 °C)      PHYSICAL EXAM:   General appearance - well appearing, no in pain or distress   Mental status - alert and cooperative   Eyes - pupils equal and reactive, extraocular eye movements intact   Ears - bilateral TM's and external ear canals normal   Mouth - mucous membranes moist, pharynx normal without lesions   Neck - supple, no significant adenopathy   Lymphatics - no palpable lymphadenopathy, no hepatosplenomegaly   Chest - clear to auscultation, no wheezes, rales or rhonchi, symmetric air entry   Heart - normal rate, regular rhythm, normal S1, S2, no murmurs, rubs, clicks or gallops   Abdomen - soft, nontender, nondistended, no masses or organomegaly   Neurological - alert, oriented, normal speech, no focal findings or movement disorder noted   Musculoskeletal - no joint tenderness, deformity or swelling   Extremities - peripheral pulses normal, no pedal edema, no clubbing or cyanosis   Skin - ++ Edematous macular skin rash over torso, upper and lower extremity    LABORATORY DATA:     Lab Results   Component Value Date    WBC 4.2 04/01/2019    HGB 13.7 04/01/2019    HCT 40.3 (L) 04/01/2019    MCV 92.2 04/01/2019     04/01/2019       Chemistry        Component Value Date/Time     (H) 11/12/2018 1018    K 4.3 11/12/2018 1018     11/12/2018 1018    CO2 26 11/12/2018 1018    BUN 22 11/12/2018 1018    CREATININE 0.87 11/12/2018 1018        Component Value Date/Time    CALCIUM 9.5 11/12/2018 1018    ALKPHOS 92 11/12/2018 1018    AST 16 11/12/2018 1018    ALT 14 11/12/2018 1018    BILITOT 0.33 11/12/2018 1018        PATHOLOGY DATA:   Final Diagnosis  SPECIMEN \"A\": DUODENUM, BIOPSY:    DUODENAL TYPE MUCOSA DEMONSTRATING NO SIGNIFICANT HISTOPATHOLOGIC  FEATURES    THE FEATURES OF SPRUE ARE NOT IDENTIFIED   SPECIMEN \"B\": LOWER RIGHT COLON, BIOPSY:  TUBULAR ADENOMA   SPECIMEN \"C\": ILEOCECAL VALVE, BIOPSY:    BENIGN SMALL INTESTINAL TYPE MUCOSA WITH FEATURES OF  PSEUDOLIPOMATOSIS  SCANT FRAGMENT OF BENIGN FIBROADIPOSE TISSUE     Final Diagnosis   7/30/18 LEFT POSTERIOR ILIAC CREST, BONE NEEDLE BIOPSY, SEDIMENT AND REED   STAINED SMEARS OF BONE MARROW ASPIRATE SHOWING:         PATCHY MARKED HYPERCELLULARITY WITH PRESENCE OF IRON STORES     RELATIVE LYMPHOCYTOSIS (46%) WITH T-LYMPHOCYTE HYPERPLASIA     ERYTHROID HYPERPLASIA (ME RATIO IS 1.2:1)     MARKED DECREASE IN BANDS AND SEGMENTED NEUTROPHILS (5 AND 1% RESPECTIVELY)     MEGAKARYOCYTES ARE PRESENT     NO FOREIGN CELLS OR GRANULOMATA IDENTIFIED   ADDENDUM COMMENT   Additional immunostains are performed (CD4, CD8, CD5 and CD7).  These   immunostains fail to demonstrate significant aberrant staining (controls adequate).  There is a slight increased number of CD8 positive T-cells as compared to CD4 positive cells.  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 This is of uncertain clinical significance. A portion of the bone marrow was sent to [facility] for cytogenetic studies including FISH. [facility] results are as follows. (Please see their separate report [id number] for complete results). Modal # of Chromosomes:     46          No. of Cells Counted:     20   Staining Method:          GTG;FISH     No. of Cells Analyzed:     20   No. and/or Type of Cultures:     24MF;48CM     Hypomodal Cells:     2   No. of Karyograms:     6          Hypermodal Cells:     1     Karyotype:   46,XY[20]. nuc   tiffany(D5S23,EGR1)x2[200],(CEP7,D0N955)x2[200],(C2P930,CEP8)x2[200],   (TEL20p,X42Z986)x2[200]     Cytogenetic Diagnosis   A.     Normal male chromosome complement   B.      FISH: No evidence of -5/del(5q), -7/del(7q) or -20/del(20q)   C.      FISH: No evidence of +8     Interpretation:   Cytogenetic examination of twenty metaphase cells showed a normal   karyotype with no consistent numerical or structural chromosome   aberrations observed.       Fluorescence in situ hybridization (FISH) analysis was performed using   1) the LSI 5q EGR1 (5q31, SpectrumOrange)/D5S23,U1J351 (5p15.2,   SpectrumGreen) Dual Color DNA probe set (Vysis) which detects deletions of 5q31 containing the EGR1 locus and can be used to identify loss of chromosome 5; 2) the LSI H4W211 (7q31, SpectrumOrange)/CEP7 (7 centromere, SpectrumGreen) Dual Color DNA probe set (Vysis) which detects deletions of 7q31 and may be used to identify loss of chromosome 7;  3) the LSI CEP 8 (SpectrumOrange)/P8H793 (8p telomere, SpectrumGreen) probe combination which allows enumeration of chromosome 8 centromere and may be used to detect gain or loss of chromosome 8; and 4) the 20p TEL (20p13, SpectrumGreen)/LSI [id number] (20q12, SpectrumOrange) probe combination which detects deletion of 20q. Examination of 200 interphase nuclei with each probe revealed normal hybridization patterns. Please note that this analysis does not eliminate the possibility of single gene defects, chromosomal mosaicism involving abnormal cell lines of low frequency, small structural chromosome abnormalities, or failure to sample any malignant clone(s) that may be present.     IMAGING DATA:    Reviewed  PET scan 8/27/18  Impression   1. No evidence of metabolically active lymphadenopathy. 2. Mild generalized uptake within the osseous structures without   corresponding CT abnormality as well as splenomegaly which are likely due to   history of  immunoproliferative disease. 3. Focal uptake in the right perianal region with associated mild soft tissue   prominence, please correlate with direct physical examination. 4. Chronic findings as described including cholelithiasis. ASSESSMENT:    Mr Franki Bear is 59 YOM with chronic neutropenia. Recently his platelets and hemoglobindropped from baseline. So had a BM biopsy. This showed increased lymphoid population withPositive for TCR. He had negative PET scan for lymphadenopathy. 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 I discussed the plan of systemic chemotherapy. Patient's case was discussed at tumor Board's. As per pathologist cyclic neutropenia can be assisted with T-cell lymphoid hyperplasia and bone marrow. He does not have clinical B symptoms and his hemoglobin has improved now. At He was seen at [facility] and he was thought to have T-cell LGL. He is on methotrexate. Tolerating well. Patient's questions were sought and answered to his satisfaction.   He agreed with the plan.  PLAN:   Continue methotrexate   We will get CBC in 8 weeks  Return to clinic in 8 weeks    I spent more than 40 minutes examining, evaluating, reviewing data and counseling the patient. Greater than 50% of that time was spent face-to-face with the patient in counseling and coordinating her care.     Benedicto Narayanan MD  Hematology/Oncology    CC: MELISSA Christianson - CNP

## 2019-04-27 DIAGNOSIS — C91.10 CHRONIC LARGE GRANULAR LYMPHOCYTIC LEUKEMIA (HCC): ICD-10-CM

## 2019-04-29 RX ORDER — METHOTREXATE 2.5 MG/1
20 TABLET ORAL WEEKLY
Qty: 32 TABLET | Refills: 1 | Status: SHIPPED | OUTPATIENT
Start: 2019-04-29 | End: 2019-06-25 | Stop reason: SDUPTHER

## 2019-06-24 ENCOUNTER — HOSPITAL ENCOUNTER (OUTPATIENT)
Age: 69
Setting detail: SPECIMEN
Discharge: HOME | End: 2019-06-24
Payer: MEDICARE

## 2019-06-24 DIAGNOSIS — C84.90 MATURE NK/T-CELL LYMPHOMA, UNSPECIFIED BODY REGION, UNSPECIFIED MATURE NK/T-CELL LYMPHOMA TYPE (HCC): ICD-10-CM

## 2019-06-25 DIAGNOSIS — C91.10 CHRONIC LARGE GRANULAR LYMPHOCYTIC LEUKEMIA (HCC): ICD-10-CM

## 2019-06-25 LAB
ALBUMIN SERPL-MCNC: 4.2 G/DL (ref 3.5–5.2)
ALBUMIN/GLOBULIN RATIO: 1.4 (ref 1–2.5)
ALP BLD-CCNC: 88 U/L (ref 40–129)
ALT SERPL-CCNC: 32 U/L (ref 5–41)
ANION GAP SERPL CALCULATED.3IONS-SCNC: 11 MMOL/L (ref 9–17)
AST SERPL-CCNC: 21 U/L
BILIRUB SERPL-MCNC: 0.49 MG/DL (ref 0.3–1.2)
BUN BLDV-MCNC: 18 MG/DL (ref 8–23)
BUN/CREAT BLD: ABNORMAL (ref 9–20)
CALCIUM SERPL-MCNC: 9.7 MG/DL (ref 8.6–10.4)
CHLORIDE BLD-SCNC: 101 MMOL/L (ref 98–107)
CO2: 26 MMOL/L (ref 20–31)
CREAT SERPL-MCNC: 0.81 MG/DL (ref 0.7–1.2)
GFR AFRICAN AMERICAN: >60 ML/MIN
GFR NON-AFRICAN AMERICAN: >60 ML/MIN
GFR SERPL CREATININE-BSD FRML MDRD: ABNORMAL ML/MIN/{1.73_M2}
GFR SERPL CREATININE-BSD FRML MDRD: ABNORMAL ML/MIN/{1.73_M2}
GLUCOSE BLD-MCNC: 68 MG/DL (ref 70–99)
POTASSIUM SERPL-SCNC: 4.3 MMOL/L (ref 3.7–5.3)
SODIUM BLD-SCNC: 138 MMOL/L (ref 135–144)
TOTAL PROTEIN: 7.1 G/DL (ref 6.4–8.3)

## 2019-06-25 RX ORDER — METHOTREXATE 2.5 MG/1
20 TABLET ORAL WEEKLY
Qty: 32 TABLET | Refills: 1 | Status: SHIPPED | OUTPATIENT
Start: 2019-06-25 | End: 2019-08-20 | Stop reason: SDUPTHER

## 2019-07-03 DIAGNOSIS — E61.1 IRON DEFICIENCY: Primary | ICD-10-CM

## 2019-07-09 ENCOUNTER — TELEPHONE (OUTPATIENT)
Dept: ONCOLOGY | Age: 69
End: 2019-07-09

## 2019-07-09 ENCOUNTER — HOSPITAL ENCOUNTER (OUTPATIENT)
Age: 69
Discharge: HOME | End: 2019-07-09
Payer: MEDICARE

## 2019-07-09 ENCOUNTER — OFFICE VISIT (OUTPATIENT)
Dept: ONCOLOGY | Age: 69
End: 2019-07-09
Payer: MEDICARE

## 2019-07-09 VITALS
RESPIRATION RATE: 18 BRPM | DIASTOLIC BLOOD PRESSURE: 75 MMHG | WEIGHT: 185.4 LBS | BODY MASS INDEX: 30.85 KG/M2 | HEART RATE: 78 BPM | SYSTOLIC BLOOD PRESSURE: 144 MMHG | TEMPERATURE: 98.2 F

## 2019-07-09 DIAGNOSIS — D75.89 BONE MARROW HYPERPLASIA: ICD-10-CM

## 2019-07-09 DIAGNOSIS — E61.1 IRON DEFICIENCY: ICD-10-CM

## 2019-07-09 DIAGNOSIS — C91.10 CHRONIC LARGE GRANULAR LYMPHOCYTIC LEUKEMIA (HCC): Primary | ICD-10-CM

## 2019-07-09 LAB
ABSOLUTE EOS #: 0 K/UL (ref 0–0.4)
ABSOLUTE IMMATURE GRANULOCYTE: NORMAL K/UL (ref 0–0.3)
ABSOLUTE LYMPH #: 1 K/UL (ref 1–4.8)
ABSOLUTE MONO #: 0.4 K/UL (ref 0.1–1.2)
ALBUMIN SERPL-MCNC: 4.3 G/DL (ref 3.5–5.2)
ALBUMIN/GLOBULIN RATIO: 1.5 (ref 1–2.5)
ALP BLD-CCNC: 77 U/L (ref 40–129)
ALT SERPL-CCNC: 63 U/L (ref 5–41)
ANION GAP SERPL CALCULATED.3IONS-SCNC: 11 MMOL/L (ref 9–17)
AST SERPL-CCNC: 37 U/L
BASOPHILS # BLD: 0 % (ref 0–2)
BASOPHILS ABSOLUTE: 0 K/UL (ref 0–0.2)
BILIRUB SERPL-MCNC: 0.45 MG/DL (ref 0.3–1.2)
BUN BLDV-MCNC: 19 MG/DL (ref 8–23)
BUN/CREAT BLD: ABNORMAL (ref 9–20)
CALCIUM SERPL-MCNC: 10 MG/DL (ref 8.6–10.4)
CHLORIDE BLD-SCNC: 101 MMOL/L (ref 98–107)
CO2: 29 MMOL/L (ref 20–31)
CREAT SERPL-MCNC: 1 MG/DL (ref 0.7–1.2)
DIFFERENTIAL TYPE: NORMAL
EOSINOPHILS RELATIVE PERCENT: 1 % (ref 1–4)
GFR AFRICAN AMERICAN: >60 ML/MIN
GFR NON-AFRICAN AMERICAN: >60 ML/MIN
GFR SERPL CREATININE-BSD FRML MDRD: ABNORMAL ML/MIN/{1.73_M2}
GFR SERPL CREATININE-BSD FRML MDRD: ABNORMAL ML/MIN/{1.73_M2}
GLUCOSE BLD-MCNC: 201 MG/DL (ref 70–99)
HCT VFR BLD CALC: 42.3 % (ref 41–53)
HEMOGLOBIN: 14.6 G/DL (ref 13.5–17.5)
IMMATURE GRANULOCYTES: NORMAL %
LYMPHOCYTES # BLD: 24 % (ref 24–44)
MCH RBC QN AUTO: 31.4 PG (ref 26–34)
MCHC RBC AUTO-ENTMCNC: 34.5 G/DL (ref 31–37)
MCV RBC AUTO: 90.8 FL (ref 80–100)
MONOCYTES # BLD: 9 % (ref 2–11)
NRBC AUTOMATED: NORMAL PER 100 WBC
PDW BLD-RTO: 14.5 % (ref 12.5–15.4)
PLATELET # BLD: 152 K/UL (ref 140–450)
PLATELET ESTIMATE: NORMAL
PMV BLD AUTO: 8.8 FL (ref 6–12)
POTASSIUM SERPL-SCNC: 3.8 MMOL/L (ref 3.7–5.3)
RBC # BLD: 4.66 M/UL (ref 4.5–5.9)
RBC # BLD: NORMAL 10*6/UL
SEG NEUTROPHILS: 66 % (ref 36–66)
SEGMENTED NEUTROPHILS ABSOLUTE COUNT: 2.8 K/UL (ref 1.8–7.7)
SODIUM BLD-SCNC: 141 MMOL/L (ref 135–144)
TOTAL PROTEIN: 7.2 G/DL (ref 6.4–8.3)
WBC # BLD: 4.3 K/UL (ref 3.5–11)
WBC # BLD: NORMAL 10*3/UL

## 2019-07-09 PROCEDURE — 1123F ACP DISCUSS/DSCN MKR DOCD: CPT | Performed by: INTERNAL MEDICINE

## 2019-07-09 PROCEDURE — 99211 OFF/OP EST MAY X REQ PHY/QHP: CPT

## 2019-07-09 PROCEDURE — 80053 COMPREHEN METABOLIC PANEL: CPT

## 2019-07-09 PROCEDURE — 85025 COMPLETE CBC W/AUTO DIFF WBC: CPT

## 2019-07-09 PROCEDURE — 4040F PNEUMOC VAC/ADMIN/RCVD: CPT | Performed by: INTERNAL MEDICINE

## 2019-07-09 PROCEDURE — 99215 OFFICE O/P EST HI 40 MIN: CPT | Performed by: INTERNAL MEDICINE

## 2019-07-09 PROCEDURE — 1036F TOBACCO NON-USER: CPT | Performed by: INTERNAL MEDICINE

## 2019-07-09 PROCEDURE — 3017F COLORECTAL CA SCREEN DOC REV: CPT | Performed by: INTERNAL MEDICINE

## 2019-07-09 PROCEDURE — 36415 COLL VENOUS BLD VENIPUNCTURE: CPT

## 2019-07-09 PROCEDURE — G8427 DOCREV CUR MEDS BY ELIG CLIN: HCPCS | Performed by: INTERNAL MEDICINE

## 2019-07-09 PROCEDURE — G8417 CALC BMI ABV UP PARAM F/U: HCPCS | Performed by: INTERNAL MEDICINE

## 2019-07-09 NOTE — PROGRESS NOTES
DELICA LANCETS 70Z MISC To test daily and PRN for hypogycemia 90 each 1    blood glucose test strips (ONE TOUCH ULTRA TEST) strip 1 each by Other route daily As needed. 90 strip 1     No current facility-administered medications for this visit. REVIEW OF SYSTEM:     Constitutional: No fever or chills. No night sweats, no weight loss, some fatigue late during the day  Eyes: No eye discharge, double vision, or eye pain   HEENT: negative for sore mouth, sore throat, hoarseness and voice change   Respiratory: negative for cough , sputum, dyspnea, wheezing, hemoptysis, chest pain   Cardiovascular: negative for chest pain, dyspnea, palpitations, orthopnea, PND   Gastrointestinal: negative for nausea, vomiting, diarrhea, constipation, abdominal pain, Dysphagia, hematemesis and hematochezia, +rectal pain.   Genitourinary: negative for frequency, dysuria, nocturia, urinary incontinence, and hematuria   Integument: ++ Edematous macular skin rash over torso, upper and lower extremity  Hematologic/Lymphatic: negative for easy bruising, bleeding, lymphadenopathy, petechiae and swelling/edema   Endocrine: negative for heat or cold intolerance, tremor, weight changes, change in bowel habits and hair loss   Musculoskeletal: negative for myalgias, arthralgias, pain, joint swelling,and bone pain   Neurological: negative for headaches, dizziness, seizures, weakness, numbness     OBJECTIVE:         Vitals:    07/09/19 0845   BP: (!) 144/75   Pulse: 78   Resp: 18   Temp: 98.2 °F (36.8 °C)      PHYSICAL EXAM:   General appearance - well appearing, no in pain or distress   Mental status - alert and cooperative   Eyes - pupils equal and reactive, extraocular eye movements intact   Ears - bilateral TM's and external ear canals normal   Mouth - mucous membranes moist, pharynx normal without lesions   Neck - supple, no significant adenopathy   Lymphatics - no palpable lymphadenopathy, no hepatosplenomegaly   Chest - clear to (SpectrumOrange)/X1V083 (8p telomere, SpectrumGreen) probe combination which allows enumeration of chromosome 8 centromere and may be used to detect gain or loss of chromosome 8; and 4) the 20p TEL (20p13, SpectrumGreen)/LSI [id number] (20q12, SpectrumOrange) probe combination which detects deletion of 20q. Examination of 200 interphase nuclei with each probe revealed normal hybridization patterns. Please note that this analysis does not eliminate the possibility of single gene defects, chromosomal mosaicism involving abnormal cell lines of low frequency, small structural chromosome abnormalities, or failure to sample any malignant clone(s) that may be present.     IMAGING DATA:    Reviewed  PET scan 8/27/18  Impression   1. No evidence of metabolically active lymphadenopathy. 2. Mild generalized uptake within the osseous structures without   corresponding CT abnormality as well as splenomegaly which are likely due to   history of  immunoproliferative disease. 3. Focal uptake in the right perianal region with associated mild soft tissue   prominence, please correlate with direct physical examination. 4. Chronic findings as described including cholelithiasis. ASSESSMENT:    Mr Sha Puckett is 59 YOM with chronic neutropenia. Recently his platelets and hemoglobindropped from baseline. So had a BM biopsy. This showed increased lymphoid population withPositive for TCR. He had negative PET scan for lymphadenopathy. 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 I discussed the plan of systemic chemotherapy. Patient's case was discussed at tumor Board's. As per pathologist cyclic neutropenia can be assisted with T-cell lymphoid hyperplasia and bone marrow. He does not have clinical B symptoms and his hemoglobin has improved now. At He was seen at [facility] and he was thought to have T-cell LGL. He is on methotrexate.  Tolerating

## 2019-08-20 DIAGNOSIS — C91.10 CHRONIC LARGE GRANULAR LYMPHOCYTIC LEUKEMIA (HCC): ICD-10-CM

## 2019-09-06 ENCOUNTER — TELEPHONE (OUTPATIENT)
Dept: ONCOLOGY | Age: 69
End: 2019-09-06

## 2019-10-01 ENCOUNTER — HOSPITAL ENCOUNTER (OUTPATIENT)
Age: 69
Setting detail: SPECIMEN
Discharge: HOME | End: 2019-10-01
Payer: MEDICARE

## 2019-10-01 DIAGNOSIS — E55.9 VITAMIN D DEFICIENCY: ICD-10-CM

## 2019-10-01 DIAGNOSIS — E11.9 TYPE 2 DIABETES MELLITUS WITHOUT COMPLICATION, WITHOUT LONG-TERM CURRENT USE OF INSULIN (HCC): ICD-10-CM

## 2019-10-01 DIAGNOSIS — E03.9 HYPOTHYROIDISM, UNSPECIFIED TYPE: ICD-10-CM

## 2019-10-01 DIAGNOSIS — Z12.5 SCREENING PSA (PROSTATE SPECIFIC ANTIGEN): ICD-10-CM

## 2019-10-01 LAB
ABSOLUTE EOS #: 0.04 K/UL (ref 0–0.44)
ABSOLUTE IMMATURE GRANULOCYTE: <0.03 K/UL (ref 0–0.3)
ABSOLUTE LYMPH #: 1.43 K/UL (ref 1.1–3.7)
ABSOLUTE MONO #: 0.58 K/UL (ref 0.1–1.2)
ALBUMIN SERPL-MCNC: 4.1 G/DL (ref 3.5–5.2)
ALBUMIN/GLOBULIN RATIO: 1.3 (ref 1–2.5)
ALP BLD-CCNC: 79 U/L (ref 40–129)
ALT SERPL-CCNC: 65 U/L (ref 5–41)
ANION GAP SERPL CALCULATED.3IONS-SCNC: 12 MMOL/L (ref 9–17)
AST SERPL-CCNC: 45 U/L
BASOPHILS # BLD: 1 % (ref 0–2)
BASOPHILS ABSOLUTE: 0.04 K/UL (ref 0–0.2)
BILIRUB SERPL-MCNC: 0.38 MG/DL (ref 0.3–1.2)
BUN BLDV-MCNC: 20 MG/DL (ref 8–23)
BUN/CREAT BLD: ABNORMAL (ref 9–20)
CALCIUM SERPL-MCNC: 9.8 MG/DL (ref 8.6–10.4)
CHLORIDE BLD-SCNC: 103 MMOL/L (ref 98–107)
CHOLESTEROL/HDL RATIO: 2.3
CHOLESTEROL: 125 MG/DL
CO2: 26 MMOL/L (ref 20–31)
CREAT SERPL-MCNC: 0.76 MG/DL (ref 0.7–1.2)
DIFFERENTIAL TYPE: NORMAL
EOSINOPHILS RELATIVE PERCENT: 1 % (ref 1–4)
ESTIMATED AVERAGE GLUCOSE: 108 MG/DL
GFR AFRICAN AMERICAN: >60 ML/MIN
GFR NON-AFRICAN AMERICAN: >60 ML/MIN
GFR SERPL CREATININE-BSD FRML MDRD: ABNORMAL ML/MIN/{1.73_M2}
GFR SERPL CREATININE-BSD FRML MDRD: ABNORMAL ML/MIN/{1.73_M2}
GLUCOSE BLD-MCNC: 106 MG/DL (ref 70–99)
HBA1C MFR BLD: 5.4 % (ref 4–6)
HCT VFR BLD CALC: 44.1 % (ref 40.7–50.3)
HDLC SERPL-MCNC: 54 MG/DL
HEMOGLOBIN: 14.8 G/DL (ref 13–17)
IMMATURE GRANULOCYTES: 0 %
LDL CHOLESTEROL: 55 MG/DL (ref 0–130)
LYMPHOCYTES # BLD: 27 % (ref 24–43)
MCH RBC QN AUTO: 31 PG (ref 25.2–33.5)
MCHC RBC AUTO-ENTMCNC: 33.6 G/DL (ref 28.4–34.8)
MCV RBC AUTO: 92.3 FL (ref 82.6–102.9)
MONOCYTES # BLD: 11 % (ref 3–12)
NRBC AUTOMATED: 0 PER 100 WBC
PDW BLD-RTO: 14.3 % (ref 11.8–14.4)
PLATELET # BLD: 192 K/UL (ref 138–453)
PLATELET ESTIMATE: NORMAL
PMV BLD AUTO: 11.2 FL (ref 8.1–13.5)
POTASSIUM SERPL-SCNC: 4.1 MMOL/L (ref 3.7–5.3)
PROSTATE SPECIFIC ANTIGEN: 1.79 UG/L
RBC # BLD: 4.78 M/UL (ref 4.21–5.77)
RBC # BLD: NORMAL 10*6/UL
SEG NEUTROPHILS: 60 % (ref 36–65)
SEGMENTED NEUTROPHILS ABSOLUTE COUNT: 3.28 K/UL (ref 1.5–8.1)
SODIUM BLD-SCNC: 141 MMOL/L (ref 135–144)
TOTAL PROTEIN: 7.2 G/DL (ref 6.4–8.3)
TRIGL SERPL-MCNC: 79 MG/DL
TSH SERPL DL<=0.05 MIU/L-ACNC: 0.55 MIU/L (ref 0.3–5)
VITAMIN D 25-HYDROXY: 33.4 NG/ML (ref 30–100)
VLDLC SERPL CALC-MCNC: NORMAL MG/DL (ref 1–30)
WBC # BLD: 5.4 K/UL (ref 3.5–11.3)
WBC # BLD: NORMAL 10*3/UL

## 2019-10-08 ENCOUNTER — TELEPHONE (OUTPATIENT)
Dept: ONCOLOGY | Age: 69
End: 2019-10-08

## 2019-10-08 ENCOUNTER — OFFICE VISIT (OUTPATIENT)
Dept: ONCOLOGY | Age: 69
End: 2019-10-08
Payer: MEDICARE

## 2019-10-08 VITALS
WEIGHT: 189.9 LBS | BODY MASS INDEX: 31.6 KG/M2 | SYSTOLIC BLOOD PRESSURE: 129 MMHG | TEMPERATURE: 97.8 F | HEART RATE: 72 BPM | DIASTOLIC BLOOD PRESSURE: 81 MMHG

## 2019-10-08 DIAGNOSIS — D75.89 BONE MARROW HYPERPLASIA: ICD-10-CM

## 2019-10-08 DIAGNOSIS — C84.90 MATURE NK/T-CELL LYMPHOMA, UNSPECIFIED BODY REGION, UNSPECIFIED MATURE NK/T-CELL LYMPHOMA TYPE (HCC): Primary | ICD-10-CM

## 2019-10-08 PROCEDURE — 1036F TOBACCO NON-USER: CPT | Performed by: INTERNAL MEDICINE

## 2019-10-08 PROCEDURE — 99214 OFFICE O/P EST MOD 30 MIN: CPT | Performed by: INTERNAL MEDICINE

## 2019-10-08 PROCEDURE — 4040F PNEUMOC VAC/ADMIN/RCVD: CPT | Performed by: INTERNAL MEDICINE

## 2019-10-08 PROCEDURE — 3017F COLORECTAL CA SCREEN DOC REV: CPT | Performed by: INTERNAL MEDICINE

## 2019-10-08 PROCEDURE — G8427 DOCREV CUR MEDS BY ELIG CLIN: HCPCS | Performed by: INTERNAL MEDICINE

## 2019-10-08 PROCEDURE — G8484 FLU IMMUNIZE NO ADMIN: HCPCS | Performed by: INTERNAL MEDICINE

## 2019-10-08 PROCEDURE — 99211 OFF/OP EST MAY X REQ PHY/QHP: CPT | Performed by: INTERNAL MEDICINE

## 2019-10-08 PROCEDURE — G8417 CALC BMI ABV UP PARAM F/U: HCPCS | Performed by: INTERNAL MEDICINE

## 2019-10-08 PROCEDURE — 1123F ACP DISCUSS/DSCN MKR DOCD: CPT | Performed by: INTERNAL MEDICINE

## 2019-10-18 DIAGNOSIS — C91.10 CHRONIC LARGE GRANULAR LYMPHOCYTIC LEUKEMIA (HCC): ICD-10-CM

## 2019-10-18 RX ORDER — METHOTREXATE 2.5 MG/1
TABLET ORAL
Qty: 32 TABLET | Refills: 1 | Status: SHIPPED | OUTPATIENT
Start: 2019-10-18 | End: 2019-12-05 | Stop reason: SDUPTHER

## 2019-11-07 ENCOUNTER — HOSPITAL ENCOUNTER (OUTPATIENT)
Dept: GENERAL RADIOLOGY | Facility: CLINIC | Age: 69
Discharge: HOME | End: 2019-11-09
Payer: MEDICARE

## 2019-11-07 ENCOUNTER — HOSPITAL ENCOUNTER (OUTPATIENT)
Facility: CLINIC | Age: 69
Discharge: HOME | End: 2019-11-09
Payer: MEDICARE

## 2019-11-07 DIAGNOSIS — R04.2 HEMOPTYSIS: ICD-10-CM

## 2019-11-07 DIAGNOSIS — R09.89 RALES 1/4 WAY UP POSTERIOR CHEST WALL ON LEFT SIDE: ICD-10-CM

## 2019-11-07 DIAGNOSIS — R06.2 WHEEZING: ICD-10-CM

## 2019-11-07 PROCEDURE — 71046 X-RAY EXAM CHEST 2 VIEWS: CPT

## 2019-12-05 DIAGNOSIS — C91.10 CHRONIC LARGE GRANULAR LYMPHOCYTIC LEUKEMIA (HCC): ICD-10-CM

## 2019-12-05 RX ORDER — METHOTREXATE 2.5 MG/1
TABLET ORAL
Qty: 32 TABLET | Refills: 1 | Status: SHIPPED | OUTPATIENT
Start: 2019-12-05 | End: 2020-02-10

## 2020-01-14 ENCOUNTER — TELEPHONE (OUTPATIENT)
Dept: ONCOLOGY | Age: 70
End: 2020-01-14

## 2020-01-14 ENCOUNTER — HOSPITAL ENCOUNTER (OUTPATIENT)
Age: 70
Discharge: HOME | End: 2020-01-14
Payer: MEDICARE

## 2020-01-14 ENCOUNTER — OFFICE VISIT (OUTPATIENT)
Dept: ONCOLOGY | Age: 70
End: 2020-01-14
Payer: MEDICARE

## 2020-01-14 VITALS
BODY MASS INDEX: 32.2 KG/M2 | HEART RATE: 83 BPM | TEMPERATURE: 97.6 F | WEIGHT: 193.5 LBS | DIASTOLIC BLOOD PRESSURE: 69 MMHG | SYSTOLIC BLOOD PRESSURE: 108 MMHG

## 2020-01-14 DIAGNOSIS — C84.90 MATURE NK/T-CELL LYMPHOMA, UNSPECIFIED BODY REGION, UNSPECIFIED MATURE NK/T-CELL LYMPHOMA TYPE (HCC): ICD-10-CM

## 2020-01-14 LAB
ABSOLUTE EOS #: 0.1 K/UL (ref 0–0.4)
ABSOLUTE IMMATURE GRANULOCYTE: ABNORMAL K/UL (ref 0–0.3)
ABSOLUTE LYMPH #: 1.5 K/UL (ref 1–4.8)
ABSOLUTE MONO #: 0.7 K/UL (ref 0.1–1.2)
ALBUMIN SERPL-MCNC: 4.2 G/DL (ref 3.5–5.2)
ALBUMIN/GLOBULIN RATIO: 1.4 (ref 1–2.5)
ALP BLD-CCNC: 85 U/L (ref 40–129)
ALT SERPL-CCNC: 57 U/L (ref 5–41)
ANION GAP SERPL CALCULATED.3IONS-SCNC: 10 MMOL/L (ref 9–17)
AST SERPL-CCNC: 31 U/L
BASOPHILS # BLD: 1 % (ref 0–2)
BASOPHILS ABSOLUTE: 0 K/UL (ref 0–0.2)
BILIRUB SERPL-MCNC: 0.29 MG/DL (ref 0.3–1.2)
BUN BLDV-MCNC: 17 MG/DL (ref 8–23)
BUN/CREAT BLD: ABNORMAL (ref 9–20)
CALCIUM SERPL-MCNC: 9.4 MG/DL (ref 8.6–10.4)
CHLORIDE BLD-SCNC: 97 MMOL/L (ref 98–107)
CO2: 31 MMOL/L (ref 20–31)
CREAT SERPL-MCNC: 0.92 MG/DL (ref 0.7–1.2)
DIFFERENTIAL TYPE: ABNORMAL
EOSINOPHILS RELATIVE PERCENT: 1 % (ref 1–4)
GFR AFRICAN AMERICAN: >60 ML/MIN
GFR NON-AFRICAN AMERICAN: >60 ML/MIN
GFR SERPL CREATININE-BSD FRML MDRD: ABNORMAL ML/MIN/{1.73_M2}
GFR SERPL CREATININE-BSD FRML MDRD: ABNORMAL ML/MIN/{1.73_M2}
GLUCOSE BLD-MCNC: 156 MG/DL (ref 70–99)
HCT VFR BLD CALC: 43.5 % (ref 41–53)
HEMOGLOBIN: 14.7 G/DL (ref 13.5–17.5)
IMMATURE GRANULOCYTES: ABNORMAL %
LYMPHOCYTES # BLD: 20 % (ref 24–44)
MCH RBC QN AUTO: 30.6 PG (ref 26–34)
MCHC RBC AUTO-ENTMCNC: 33.8 G/DL (ref 31–37)
MCV RBC AUTO: 90.5 FL (ref 80–100)
MONOCYTES # BLD: 9 % (ref 2–11)
NRBC AUTOMATED: ABNORMAL PER 100 WBC
PDW BLD-RTO: 14.8 % (ref 12.5–15.4)
PLATELET # BLD: 179 K/UL (ref 140–450)
PLATELET ESTIMATE: ABNORMAL
PMV BLD AUTO: 8.5 FL (ref 6–12)
POTASSIUM SERPL-SCNC: 4.2 MMOL/L (ref 3.7–5.3)
RBC # BLD: 4.8 M/UL (ref 4.5–5.9)
RBC # BLD: ABNORMAL 10*6/UL
SEG NEUTROPHILS: 69 % (ref 36–66)
SEGMENTED NEUTROPHILS ABSOLUTE COUNT: 5.1 K/UL (ref 1.8–7.7)
SODIUM BLD-SCNC: 138 MMOL/L (ref 135–144)
TOTAL PROTEIN: 7.3 G/DL (ref 6.4–8.3)
WBC # BLD: 7.3 K/UL (ref 3.5–11)
WBC # BLD: ABNORMAL 10*3/UL

## 2020-01-14 PROCEDURE — 99215 OFFICE O/P EST HI 40 MIN: CPT | Performed by: INTERNAL MEDICINE

## 2020-01-14 PROCEDURE — 80053 COMPREHEN METABOLIC PANEL: CPT

## 2020-01-14 PROCEDURE — G8484 FLU IMMUNIZE NO ADMIN: HCPCS | Performed by: INTERNAL MEDICINE

## 2020-01-14 PROCEDURE — 99211 OFF/OP EST MAY X REQ PHY/QHP: CPT | Performed by: INTERNAL MEDICINE

## 2020-01-14 PROCEDURE — 1123F ACP DISCUSS/DSCN MKR DOCD: CPT | Performed by: INTERNAL MEDICINE

## 2020-01-14 PROCEDURE — 85025 COMPLETE CBC W/AUTO DIFF WBC: CPT

## 2020-01-14 PROCEDURE — G8427 DOCREV CUR MEDS BY ELIG CLIN: HCPCS | Performed by: INTERNAL MEDICINE

## 2020-01-14 PROCEDURE — 3017F COLORECTAL CA SCREEN DOC REV: CPT | Performed by: INTERNAL MEDICINE

## 2020-01-14 PROCEDURE — G8417 CALC BMI ABV UP PARAM F/U: HCPCS | Performed by: INTERNAL MEDICINE

## 2020-01-14 PROCEDURE — 36415 COLL VENOUS BLD VENIPUNCTURE: CPT

## 2020-01-14 PROCEDURE — 4040F PNEUMOC VAC/ADMIN/RCVD: CPT | Performed by: INTERNAL MEDICINE

## 2020-01-14 PROCEDURE — 1036F TOBACCO NON-USER: CPT | Performed by: INTERNAL MEDICINE

## 2020-01-14 NOTE — PROGRESS NOTES
systemic chemotherapy. Patient's case was discussed at tumor Board's. As per pathologist cyclic neutropenia can be assisted with T-cell lymphoid hyperplasia and bone marrow. He does not have clinical B symptoms and his hemoglobin has improved now. At He was seen at [facility] clinic and he was thought to have T-cell LGL. He is on methotrexate. Tolerating well. Patient's questions were sought and answered to his satisfaction. He agreed with the plan. PLAN:   Continue methotrexate. I reviewed the recent lab work  His liver enzymes continue to improve  We will get bone marrow biopsy in February  Return to clinic after bone marrow biopsy to discuss results and further recommendations      I spent more than 40 minutes examining, evaluating, reviewing data and counseling the patient. Greater than 50% of that time was spent face-to-face with the patient in counseling and coordinating her care.     Sheela Narayanan MD  Hematology/Oncology    CC: Gayathri Valerio, MELISSA - CNP

## 2020-01-15 ENCOUNTER — TELEPHONE (OUTPATIENT)
Dept: INTERVENTIONAL RADIOLOGY/VASCULAR | Age: 70
End: 2020-01-15

## 2020-01-29 ENCOUNTER — HOSPITAL ENCOUNTER (OUTPATIENT)
Age: 70
Setting detail: SPECIMEN
Discharge: HOME | End: 2020-01-29
Payer: MEDICARE

## 2020-01-29 LAB
ALBUMIN SERPL-MCNC: 4.3 G/DL (ref 3.5–5.2)
ALBUMIN/GLOBULIN RATIO: 1.4 (ref 1–2.5)
ALP BLD-CCNC: 97 U/L (ref 40–129)
ALT SERPL-CCNC: 54 U/L (ref 5–41)
ANION GAP SERPL CALCULATED.3IONS-SCNC: 15 MMOL/L (ref 9–17)
AST SERPL-CCNC: 28 U/L
BILIRUB SERPL-MCNC: 0.38 MG/DL (ref 0.3–1.2)
BUN BLDV-MCNC: 15 MG/DL (ref 8–23)
BUN/CREAT BLD: ABNORMAL (ref 9–20)
CALCIUM SERPL-MCNC: 10.1 MG/DL (ref 8.6–10.4)
CHLORIDE BLD-SCNC: 99 MMOL/L (ref 98–107)
CO2: 27 MMOL/L (ref 20–31)
CREAT SERPL-MCNC: 0.76 MG/DL (ref 0.7–1.2)
GFR AFRICAN AMERICAN: >60 ML/MIN
GFR NON-AFRICAN AMERICAN: >60 ML/MIN
GFR SERPL CREATININE-BSD FRML MDRD: ABNORMAL ML/MIN/{1.73_M2}
GFR SERPL CREATININE-BSD FRML MDRD: ABNORMAL ML/MIN/{1.73_M2}
GLUCOSE BLD-MCNC: 87 MG/DL (ref 70–99)
POTASSIUM SERPL-SCNC: 3.6 MMOL/L (ref 3.7–5.3)
SODIUM BLD-SCNC: 141 MMOL/L (ref 135–144)
TOTAL PROTEIN: 7.4 G/DL (ref 6.4–8.3)

## 2020-02-01 ENCOUNTER — HOSPITAL ENCOUNTER (OUTPATIENT)
Dept: ULTRASOUND IMAGING | Age: 70
Discharge: HOME | End: 2020-02-03
Payer: MEDICARE

## 2020-02-01 PROCEDURE — 76705 ECHO EXAM OF ABDOMEN: CPT

## 2020-02-04 RX ORDER — SODIUM CHLORIDE 9 MG/ML
INJECTION, SOLUTION INTRAVENOUS CONTINUOUS
Status: CANCELLED | OUTPATIENT
Start: 2020-02-04

## 2020-02-05 ENCOUNTER — HOSPITAL ENCOUNTER (OUTPATIENT)
Dept: CT IMAGING | Age: 70
Discharge: HOME | End: 2020-02-07
Payer: MEDICARE

## 2020-02-05 VITALS
HEART RATE: 68 BPM | TEMPERATURE: 96.9 F | WEIGHT: 192 LBS | DIASTOLIC BLOOD PRESSURE: 82 MMHG | BODY MASS INDEX: 31.99 KG/M2 | RESPIRATION RATE: 16 BRPM | OXYGEN SATURATION: 97 % | HEIGHT: 65 IN | SYSTOLIC BLOOD PRESSURE: 150 MMHG

## 2020-02-05 LAB
INR BLD: 0.9
PARTIAL THROMBOPLASTIN TIME: 27.8 SEC (ref 24–36)
PLATELET # BLD: 152 K/UL (ref 150–450)
PROTHROMBIN TIME: 12.4 SEC (ref 11.8–14.6)

## 2020-02-05 PROCEDURE — 38221 DX BONE MARROW BIOPSIES: CPT

## 2020-02-05 PROCEDURE — 81342 TRG GENE REARRANGEMENT ANAL: CPT

## 2020-02-05 PROCEDURE — 7100000010 HC PHASE II RECOVERY - FIRST 15 MIN

## 2020-02-05 PROCEDURE — 88184 FLOWCYTOMETRY/ TC 1 MARKER: CPT

## 2020-02-05 PROCEDURE — 85610 PROTHROMBIN TIME: CPT

## 2020-02-05 PROCEDURE — 88342 IMHCHEM/IMCYTCHM 1ST ANTB: CPT

## 2020-02-05 PROCEDURE — 2500000003 HC RX 250 WO HCPCS: Performed by: RADIOLOGY

## 2020-02-05 PROCEDURE — 7100000031 HC ASPR PHASE II RECOVERY - ADDTL 15 MIN

## 2020-02-05 PROCEDURE — 88313 SPECIAL STAINS GROUP 2: CPT

## 2020-02-05 PROCEDURE — 7100000030 HC ASPR PHASE II RECOVERY - FIRST 15 MIN

## 2020-02-05 PROCEDURE — 88311 DECALCIFY TISSUE: CPT

## 2020-02-05 PROCEDURE — 85049 AUTOMATED PLATELET COUNT: CPT

## 2020-02-05 PROCEDURE — 88305 TISSUE EXAM BY PATHOLOGIST: CPT

## 2020-02-05 PROCEDURE — 88185 FLOWCYTOMETRY/TC ADD-ON: CPT

## 2020-02-05 PROCEDURE — 88341 IMHCHEM/IMCYTCHM EA ADD ANTB: CPT

## 2020-02-05 PROCEDURE — 85730 THROMBOPLASTIN TIME PARTIAL: CPT

## 2020-02-05 PROCEDURE — 7100000011 HC PHASE II RECOVERY - ADDTL 15 MIN

## 2020-02-05 RX ORDER — ACETAMINOPHEN 325 MG/1
650 TABLET ORAL EVERY 4 HOURS PRN
Status: DISCONTINUED | OUTPATIENT
Start: 2020-02-05 | End: 2020-02-08 | Stop reason: HOSPADM

## 2020-02-05 RX ORDER — LIDOCAINE HYDROCHLORIDE 10 MG/ML
INJECTION, SOLUTION INFILTRATION; PERINEURAL
Status: COMPLETED | OUTPATIENT
Start: 2020-02-05 | End: 2020-02-05

## 2020-02-05 RX ORDER — SODIUM CHLORIDE 9 MG/ML
INJECTION, SOLUTION INTRAVENOUS CONTINUOUS
Status: DISCONTINUED | OUTPATIENT
Start: 2020-02-05 | End: 2020-02-08 | Stop reason: HOSPADM

## 2020-02-05 RX ADMIN — LIDOCAINE HYDROCHLORIDE 5 ML: 10 INJECTION, SOLUTION INFILTRATION; PERINEURAL at 13:20

## 2020-02-05 ASSESSMENT — PAIN SCALES - GENERAL
PAINLEVEL_OUTOF10: 0

## 2020-02-05 ASSESSMENT — PAIN - FUNCTIONAL ASSESSMENT: PAIN_FUNCTIONAL_ASSESSMENT: 0-10

## 2020-02-05 NOTE — H&P
HISTORY and [id number]       NAME:  Tessa Phoenix  MRN: [id number]   YOB: 1950   Date: 2/5/2020   Age: 77 y.o. Gender: male       COMPLAINT AND PRESENT HISTORY:     Tessa Phoenix is 77 y.o.,  male, here for IR BONE MARROW BIOPSY. Patient has a hx of leukopenia and neutropenia beginning in 2013. Pt also suffered with low iron. Pt has been on Iron supplements and is on Methotrexate . Patient is being managed by hematology. Patient complains of recent right sided pain that occurs in the right back and travels around to [facility]. Pt has had imaging of right kidney, GB and liver. Pt states they are all negative. Pt states the pain is like pressure, he rates at 5/10. Pt denies any nausea, vomiting or diarrhea. Pt denies any fatigue, states energy level is normal. Pt does admit to easy bruising. Pt is having bone marrow for further evaluation. No fever or chills.      PAST MEDICAL HISTORY     Past Medical History:   Diagnosis Date    Arthritis     Cancer 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    Unspecified essential hypertension 11/2/2012       SURGICAL HISTORY       Past Surgical History:   Procedure Laterality Date    COLONOSCOPY  06-11-14    tubular adenoma, prominent IC valve    COLONOSCOPY  07/03/2017    very prominent IC valve-negative pathology, polyps transverse colon-tubular adenoma, and lower sigmoid colon-hyperplastic polyp    UPPER GASTROINTESTINAL ENDOSCOPY  6/11/2014    normal       FAMILY HISTORY       Family History   Problem Relation Age of Onset    Heart Disease Mother     Diabetes Mother     Heart Disease Father     Diabetes Brother     Cancer Brother         prostate       SOCIAL HISTORY WITH MEALS 180 tablet 1    enalapril (VASOTEC) 20 MG tablet TAKE 1 TABLET BY MOUTH TWICE DAILY 180 tablet 1    hydrochlorothiazide (HYDRODIURIL) 25 MG tablet TAKE 1 TABLET BY MOUTH ONCE DAILY 90 tablet 1    omeprazole (PRILOSEC) 40 MG delayed release capsule Take 1 capsule by mouth daily 90 capsule 1    levothyroxine (SYNTHROID) 150 MCG tablet TAKE ONE TABLET BY MOUTH DAILY EXCEPT ON MONDAY AND THURSDAY TAKE ONE AND ONE-HALF TABLETS 120 tablet 1    ONETOUCH DELICA LANCETS 25C MISC To test daily and PRN for hypogycemia 90 each 1    hydrocortisone 2.5 % ointment Apply topically 2 times daily. 28.35 g 0    fluticasone (FLONASE) 50 MCG/ACT nasal spray 2 sprays by Nasal route daily (Patient taking differently: 2 sprays by Nasal route daily Using PRN) 1 Bottle 3    blood glucose test strips (ONE TOUCH ULTRA TEST) strip 1 each by Other route daily As needed. 90 strip 1     No current facility-administered medications on file prior to encounter. Negative except for what is mentioned in the HPI. GENERAL PHYSICAL EXAM     Vitals: BP (!) 158/87   Pulse 75   Temp 96.5 °F (35.8 °C) (Temporal)   Resp 18   Ht 5' 5\" (1.651 m)   Wt 192 lb (87.1 kg)   SpO2 98%   BMI 31.95 kg/m²  Body mass index is 31.95 kg/m². GENERAL APPEARANCE:   Rush Simpson is 77 y.o.,  male, moderately obese, nourished, conscious, alert. Does not appear to be distress or pain at this time. SKIN:  Warm, dry, no cyanosis or jaundice. HEAD:  Normocephalic, atraumatic, no swelling or tenderness. EYES:  Pupils equal, reactive to light. EARS:  No discharge, no marked hearing loss. NOSE:  No rhinorrhea, epistaxis or septal deformity. THROAT:  Not congested. No ulceration bleeding or discharge. NECK:  No stiffness, trachea central.  No palpable masses or L.N.                 CHEST:  Symmetrical and equal on expansion.

## 2020-02-10 LAB — BONE MARROW REPORT: NORMAL

## 2020-02-10 RX ORDER — METHOTREXATE 2.5 MG/1
TABLET ORAL
Qty: 32 TABLET | Refills: 0 | Status: SHIPPED | OUTPATIENT
Start: 2020-02-10 | End: 2020-03-05 | Stop reason: SDUPTHER

## 2020-03-05 ENCOUNTER — OFFICE VISIT (OUTPATIENT)
Dept: ONCOLOGY | Age: 70
End: 2020-03-05
Payer: MEDICARE

## 2020-03-05 ENCOUNTER — TELEPHONE (OUTPATIENT)
Dept: ONCOLOGY | Age: 70
End: 2020-03-05

## 2020-03-05 VITALS
WEIGHT: 195 LBS | DIASTOLIC BLOOD PRESSURE: 80 MMHG | SYSTOLIC BLOOD PRESSURE: 129 MMHG | HEART RATE: 83 BPM | BODY MASS INDEX: 32.45 KG/M2 | TEMPERATURE: 98 F

## 2020-03-05 PROCEDURE — G8482 FLU IMMUNIZE ORDER/ADMIN: HCPCS | Performed by: INTERNAL MEDICINE

## 2020-03-05 PROCEDURE — 99214 OFFICE O/P EST MOD 30 MIN: CPT | Performed by: INTERNAL MEDICINE

## 2020-03-05 PROCEDURE — G8417 CALC BMI ABV UP PARAM F/U: HCPCS | Performed by: INTERNAL MEDICINE

## 2020-03-05 PROCEDURE — 1123F ACP DISCUSS/DSCN MKR DOCD: CPT | Performed by: INTERNAL MEDICINE

## 2020-03-05 PROCEDURE — 4040F PNEUMOC VAC/ADMIN/RCVD: CPT | Performed by: INTERNAL MEDICINE

## 2020-03-05 PROCEDURE — 3017F COLORECTAL CA SCREEN DOC REV: CPT | Performed by: INTERNAL MEDICINE

## 2020-03-05 PROCEDURE — G8427 DOCREV CUR MEDS BY ELIG CLIN: HCPCS | Performed by: INTERNAL MEDICINE

## 2020-03-05 PROCEDURE — 1036F TOBACCO NON-USER: CPT | Performed by: INTERNAL MEDICINE

## 2020-03-05 RX ORDER — METHOTREXATE 2.5 MG/1
TABLET ORAL
Qty: 32 TABLET | Refills: 5 | Status: SHIPPED | OUTPATIENT
Start: 2020-03-05 | End: 2020-08-24

## 2020-03-06 NOTE — PROGRESS NOTES
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 He reports that he was seen by his PCP in October 2013, for routine follow up visit. His Lab work at that time revealed low WBC 3.0K. So his lab work was repeated by his PCP in  Jan 2014. Which again revealed WBC 2.6 and ANC of 0.5, so he was referred to our hematology clinic for further evaluation and recommendations. His WBC have been stable for last few months, however his iron levels were mildly low so I started him on PO iron. INTERVAL HISTORY:  Minh Starr is returning for follow up visit for his T cell LGL. He is here to discuss lab results, BM biopsy, and further recommendations. His WBC has completely normalized. His hemoglobin and platelets are within normal limits. He is tolerating methotrexate well. He denies any nausea vomiting. During this visit patient's allergy, social, medical, surgical history and medications were reviewed and updated.     Current Outpatient Medications   Medication Sig Dispense Refill    methotrexate (RHEUMATREX) 2.5 MG chemo tablet TAKE 8 TABLETS BY MOUTH ONCE A WEEK 32 tablet 5    enalapril (VASOTEC) 20 MG tablet TAKE 1 TABLET TWICE A  tablet 1    levothyroxine (SYNTHROID) 150 MCG tablet TAKE 1 TABLET DAILY EXCEPT ON MONDAY AND THURSDAY TAKE1 AND 1/2 TABLETS 104 tablet 1    hydrochlorothiazide (HYDRODIURIL) 25 MG tablet TAKE 1 TABLET ONCE DAILY 90 tablet 1    OneTouch Delica Lancets 16F MISC USE TO TEST BLOOD SUGAR THREE TO FOUR TIMES DAILY AND AS NEEDED 200 each 0    ONE TOUCH ULTRA BIOPSY:    BENIGN SMALL INTESTINAL TYPE MUCOSA WITH FEATURES OF  PSEUDOLIPOMATOSIS  SCANT FRAGMENT OF BENIGN FIBROADIPOSE TISSUE     Final Diagnosis   7/30/18 LEFT POSTERIOR ILIAC CREST, BONE NEEDLE BIOPSY, SEDIMENT AND REED   STAINED SMEARS OF BONE MARROW ASPIRATE SHOWING:         PATCHY MARKED HYPERCELLULARITY WITH PRESENCE OF IRON STORES     RELATIVE LYMPHOCYTOSIS (46%) WITH T-LYMPHOCYTE HYPERPLASIA     ERYTHROID HYPERPLASIA (ME RATIO IS 1.2:1)     MARKED DECREASE IN BANDS AND SEGMENTED NEUTROPHILS (5 AND 1% RESPECTIVELY)     MEGAKARYOCYTES ARE PRESENT     NO FOREIGN CELLS OR GRANULOMATA IDENTIFIED   ADDENDUM COMMENT   Additional immunostains are performed (CD4, CD8, CD5 and CD7).  These   immunostains fail to demonstrate significant aberrant staining (controls adequate).  There is a slight increased number of CD8 positive T-cells as compared to CD4 positive cells.  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 This is of uncertain clinical significance. A portion of the bone marrow was sent to [facility] for cytogenetic studies including FISH. [facility] results are as follows. (Please see their separate report [id number] for complete results). Modal # of Chromosomes:     46          No. of Cells Counted:     20   Staining Method:          GTG;FISH     No. of Cells Analyzed:     20   No. and/or Type of Cultures:     24MF;48CM     Hypomodal Cells:     2   No. of Karyograms:     6          Hypermodal Cells:     1     Karyotype:   46,XY[20]. nuc   tiffany(D5S23,EGR1)x2[200],(CEP7,F8T335)x2[200],(I5O356,CEP8)x2[200],   (TEL20p,X61R003)x2[200]     Cytogenetic Diagnosis   A.     Normal male chromosome complement   B.      FISH: No evidence of -5/del(5q), -7/del(7q) or -20/del(20q)   C.      FISH: No evidence of +8     Interpretation:   Cytogenetic examination of twenty metaphase cells showed a normal   karyotype with no consistent numerical or structural without   corresponding CT abnormality as well as splenomegaly which are likely due to   history of  immunoproliferative disease. 3. Focal uptake in the right perianal region with associated mild soft tissue   prominence, please correlate with direct physical examination. 4. Chronic findings as described including cholelithiasis. ASSESSMENT:    Mr Gibran Wei is 59 YOM with chronic neutropenia. Recently his platelets and hemoglobindropped from baseline. So had a BM biopsy. This showed increased lymphoid population withPositive for TCR. He had negative PET scan for lymphadenopathy. 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 I discussed the plan of systemic chemotherapy. Patient's case was discussed at tumor Board's. As per pathologist cyclic neutropenia can be assisted with T-cell lymphoid hyperplasia and bone marrow. He does not have clinical B symptoms and his hemoglobin has improved now. At He was seen at [facility] and he was thought to have T-cell LGL. He is on methotrexate. Tolerating well. Patient's questions were sought and answered to his satisfaction. He agreed with the plan. PLAN:   Continue methotrexate. I reviewed the recent lab work and recent BM biopsy  RTc 3 months with labs      I spent more than 40 minutes examining, evaluating, reviewing data and counseling the patient. Greater than 50% of that time was spent face-to-face with the patient in counseling and coordinating her care.     Judith Narayanan MD  Hematology/Oncology    CC: MELISSA Uribe - CNP

## 2020-06-23 ENCOUNTER — OFFICE VISIT (OUTPATIENT)
Dept: ONCOLOGY | Age: 70
End: 2020-06-23
Payer: MEDICARE

## 2020-06-23 ENCOUNTER — HOSPITAL ENCOUNTER (OUTPATIENT)
Age: 70
Discharge: HOME | End: 2020-06-23
Payer: MEDICARE

## 2020-06-23 ENCOUNTER — TELEPHONE (OUTPATIENT)
Dept: ONCOLOGY | Age: 70
End: 2020-06-23

## 2020-06-23 VITALS
SYSTOLIC BLOOD PRESSURE: 127 MMHG | BODY MASS INDEX: 31.45 KG/M2 | DIASTOLIC BLOOD PRESSURE: 82 MMHG | HEART RATE: 80 BPM | WEIGHT: 189 LBS | TEMPERATURE: 98 F

## 2020-06-23 DIAGNOSIS — C91.10 CHRONIC LARGE GRANULAR LYMPHOCYTIC LEUKEMIA (HCC): ICD-10-CM

## 2020-06-23 LAB
ABSOLUTE EOS #: 0.1 K/UL (ref 0–0.4)
ABSOLUTE IMMATURE GRANULOCYTE: ABNORMAL K/UL (ref 0–0.3)
ABSOLUTE LYMPH #: 1.4 K/UL (ref 1–4.8)
ABSOLUTE MONO #: 0.6 K/UL (ref 0.1–1.2)
ALBUMIN SERPL-MCNC: 4.1 G/DL (ref 3.5–5.2)
ALBUMIN/GLOBULIN RATIO: 1.5 (ref 1–2.5)
ALP BLD-CCNC: 91 U/L (ref 40–129)
ALT SERPL-CCNC: 30 U/L (ref 5–41)
ANION GAP SERPL CALCULATED.3IONS-SCNC: 9 MMOL/L (ref 9–17)
AST SERPL-CCNC: 24 U/L
BASOPHILS # BLD: 0 % (ref 0–2)
BASOPHILS ABSOLUTE: 0 K/UL (ref 0–0.2)
BILIRUB SERPL-MCNC: 0.45 MG/DL (ref 0.3–1.2)
BUN BLDV-MCNC: 16 MG/DL (ref 8–23)
BUN/CREAT BLD: ABNORMAL (ref 9–20)
CALCIUM SERPL-MCNC: 9.8 MG/DL (ref 8.6–10.4)
CHLORIDE BLD-SCNC: 99 MMOL/L (ref 98–107)
CO2: 28 MMOL/L (ref 20–31)
CREAT SERPL-MCNC: 0.82 MG/DL (ref 0.7–1.2)
DIFFERENTIAL TYPE: ABNORMAL
EOSINOPHILS RELATIVE PERCENT: 1 % (ref 1–4)
GFR AFRICAN AMERICAN: >60 ML/MIN
GFR NON-AFRICAN AMERICAN: >60 ML/MIN
GFR SERPL CREATININE-BSD FRML MDRD: ABNORMAL ML/MIN/{1.73_M2}
GFR SERPL CREATININE-BSD FRML MDRD: ABNORMAL ML/MIN/{1.73_M2}
GLUCOSE BLD-MCNC: 129 MG/DL (ref 70–99)
HCT VFR BLD CALC: 43.9 % (ref 41–53)
HEMOGLOBIN: 14.8 G/DL (ref 13.5–17.5)
IMMATURE GRANULOCYTES: ABNORMAL %
LYMPHOCYTES # BLD: 20 % (ref 24–44)
MCH RBC QN AUTO: 30.7 PG (ref 26–34)
MCHC RBC AUTO-ENTMCNC: 33.7 G/DL (ref 31–37)
MCV RBC AUTO: 90.9 FL (ref 80–100)
MONOCYTES # BLD: 9 % (ref 2–11)
NRBC AUTOMATED: ABNORMAL PER 100 WBC
PDW BLD-RTO: 14.2 % (ref 12.5–15.4)
PLATELET # BLD: 172 K/UL (ref 140–450)
PLATELET ESTIMATE: ABNORMAL
PMV BLD AUTO: 8.7 FL (ref 6–12)
POTASSIUM SERPL-SCNC: 3.7 MMOL/L (ref 3.7–5.3)
RBC # BLD: 4.83 M/UL (ref 4.5–5.9)
RBC # BLD: ABNORMAL 10*6/UL
SEG NEUTROPHILS: 70 % (ref 36–66)
SEGMENTED NEUTROPHILS ABSOLUTE COUNT: 4.7 K/UL (ref 1.8–7.7)
SODIUM BLD-SCNC: 136 MMOL/L (ref 135–144)
TOTAL PROTEIN: 6.9 G/DL (ref 6.4–8.3)
WBC # BLD: 6.8 K/UL (ref 3.5–11)
WBC # BLD: ABNORMAL 10*3/UL

## 2020-06-23 PROCEDURE — 36415 COLL VENOUS BLD VENIPUNCTURE: CPT

## 2020-06-23 PROCEDURE — 99214 OFFICE O/P EST MOD 30 MIN: CPT | Performed by: INTERNAL MEDICINE

## 2020-06-23 PROCEDURE — 80053 COMPREHEN METABOLIC PANEL: CPT

## 2020-06-23 PROCEDURE — G8417 CALC BMI ABV UP PARAM F/U: HCPCS | Performed by: INTERNAL MEDICINE

## 2020-06-23 PROCEDURE — 1123F ACP DISCUSS/DSCN MKR DOCD: CPT | Performed by: INTERNAL MEDICINE

## 2020-06-23 PROCEDURE — 1036F TOBACCO NON-USER: CPT | Performed by: INTERNAL MEDICINE

## 2020-06-23 PROCEDURE — 3017F COLORECTAL CA SCREEN DOC REV: CPT | Performed by: INTERNAL MEDICINE

## 2020-06-23 PROCEDURE — 85025 COMPLETE CBC W/AUTO DIFF WBC: CPT

## 2020-06-23 PROCEDURE — 4040F PNEUMOC VAC/ADMIN/RCVD: CPT | Performed by: INTERNAL MEDICINE

## 2020-06-23 PROCEDURE — G8427 DOCREV CUR MEDS BY ELIG CLIN: HCPCS | Performed by: INTERNAL MEDICINE

## 2020-06-23 PROCEDURE — 99211 OFF/OP EST MAY X REQ PHY/QHP: CPT | Performed by: INTERNAL MEDICINE

## 2020-06-23 NOTE — PROGRESS NOTES
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 He reports that he was seen by his PCP in October 2013, for routine follow up visit. His Lab work at that time revealed low WBC 3.0K. So his lab work was repeated by his PCP in  Jan 2014. Which again revealed WBC 2.6 and ANC of 0.5, so he was referred to our hematology clinic for further evaluation and recommendations. His WBC have been stable for last few months, however his iron levels were mildly low so I started him on PO iron. INTERVAL HISTORY:  Liliane Howard is returning for follow up visit for his T cell LGL. He is here to discuss lab results, and further recommendations. He is tolerating oral methotrexate really well. His blood counts are within normal limits and clinically he is doing so well. During this visit patient's allergy, social, medical, surgical history and medications were reviewed and updated.     Current Outpatient Medications   Medication Sig Dispense Refill    ONETOUCH ULTRA strip USE STRIP TO CHECK GLUCOSE ONCE DAILY AS NEEDED 50 each 3    omeprazole (PRILOSEC) 40 MG delayed release capsule TAKE 1 CAPSULE DAILY 90 capsule 1    OneTouch Delica Lancets 59B MISC USE TO TEST BLOOD SUGAR THREE TO FOUR TIMES DAILY AND AS NEEDED 200 each 0    methotrexate (RHEUMATREX) 2.5 MG chemo tablet TAKE 8 TABLETS BY MOUTH ONCE A WEEK 32 tablet 5    enalapril (VASOTEC) 20 MG tablet TAKE 1 TABLET TWICE A  tablet 1    levothyroxine (SYNTHROID) 150 MCG tablet TAKE 1 TABLET DAILY EXCEPT ON MONDAY AND THURSDAY TAKE1 AND 1/2 OF  PSEUDOLIPOMATOSIS  SCANT FRAGMENT OF BENIGN FIBROADIPOSE TISSUE     Final Diagnosis   7/30/18 LEFT POSTERIOR ILIAC CREST, BONE NEEDLE BIOPSY, SEDIMENT AND REED   STAINED SMEARS OF BONE MARROW ASPIRATE SHOWING:         PATCHY MARKED HYPERCELLULARITY WITH PRESENCE OF IRON STORES     RELATIVE LYMPHOCYTOSIS (46%) WITH T-LYMPHOCYTE HYPERPLASIA     ERYTHROID HYPERPLASIA (ME RATIO IS 1.2:1)     MARKED DECREASE IN BANDS AND SEGMENTED NEUTROPHILS (5 AND 1% RESPECTIVELY)     MEGAKARYOCYTES ARE PRESENT     NO FOREIGN CELLS OR GRANULOMATA IDENTIFIED   ADDENDUM COMMENT   Additional immunostains are performed (CD4, CD8, CD5 and CD7).  These   immunostains fail to demonstrate significant aberrant staining (controls adequate).  There is a slight increased number of CD8 positive T-cells as compared to CD4 positive cells.  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 This is of uncertain clinical significance. A portion of the bone marrow was sent to [facility] for cytogenetic studies including FISH. Francoise Sky results are as follows. (Please see their separate report [id number] for complete results). Modal # of Chromosomes:     46          No. of Cells Counted:     20   Staining Method:          GTG;FISH     No. of Cells Analyzed:     20   No. and/or Type of Cultures:     24MF;48CM     Hypomodal Cells:     2   No. of Karyograms:     6          Hypermodal Cells:     1     Karyotype:   46,XY[20]. nuc   tiffany(D5S23,EGR1)x2[200],(CEP7,T3V387)x2[200],(O5A081,CEP8)x2[200],   (TEL20p,J89S318)x2[200]     Cytogenetic Diagnosis   A.     Normal male chromosome complement   B.      FISH: No evidence of -5/del(5q), -7/del(7q) or -20/del(20q)   C.      FISH: No evidence of +8     Interpretation:   Cytogenetic examination of twenty metaphase cells showed a normal   karyotype with no consistent numerical or structural chromosome   aberrations observed.       Fluorescence in situ hybridization (FISH) analysis was performed using   1) the LSI 5q EGR1 (5q31, SpectrumOrange)/D5S23,L9G678 (5p15.2,   SpectrumGreen) Dual Color DNA probe set (Vysis) which detects deletions of 5q31 containing the EGR1 locus and can be used to identify loss of chromosome 5; 2) the LSI E6P606 (7q31, SpectrumOrange)/CEP7 (7 centromere, SpectrumGreen) Dual Color DNA probe set (Vysis) which detects deletions of 7q31 and may be used to identify loss of chromosome 7;  3) the LSI CEP 8 (SpectrumOrange)/P8N152 (8p telomere, SpectrumGreen) probe combination which allows enumeration of chromosome 8 centromere and may be used to detect gain or loss of chromosome 8; and 4) the 20p TEL (20p13, SpectrumGreen)/LSI H10M902 (20q12, SpectrumOrange) probe combination which detects deletion of 20q. Examination of 200 interphase nuclei with each probe revealed normal hybridization patterns. Please note that this analysis does not eliminate the possibility of single gene defects, chromosomal mosaicism involving abnormal cell lines of low frequency, small structural chromosome abnormalities, or failure to sample any malignant clone(s) that may be present.     FLOW CYTOMETRIC ANALYSIS OF PERIPHERAL BLOOD PERFORMED AT [facility] (T-CELL CLONALITY BY FLOW CYTOMETRY OF TCR V-BETA)   REPORTS NO EVIDENCE OF A MONOCLONAL T-CELL POPULATION.      BONE MARROW, ASPIRATE, BIOPSY, AND CLOT SECTIONS DEMONSTRATING:          HYPERCELLULAR HEMATOPOIETIC MARROW (80%) WITH INCREASED   T-LYMPHOCYTES (20-40%)     FLOW CYTOMETRIC ANALYSIS OF THE BONE MARROW FOR T-CELL CLONALITY   PERFORMED AT [facility] (T-CELL CLONALITY BY FLOW CYTOMETRY TCR   V-BETA) REPORTS NO EVIDENCE OF A MONOCLONAL T-CELL POPULATION   IMAGING DATA:    Reviewed  PET scan 8/27/18  Impression   1. No evidence of metabolically active lymphadenopathy.    2. Mild generalized uptake within the osseous structures without   corresponding CT abnormality as well as splenomegaly which are likely due to   history of  immunoproliferative disease. 3. Focal uptake in the right perianal region with associated mild soft tissue   prominence, please correlate with direct physical examination. 4. Chronic findings as described including cholelithiasis. ASSESSMENT:    Mr Power Keating is 59 YOM with chronic neutropenia. Recently his platelets and hemoglobindropped from baseline. So had a BM biopsy. This showed increased lymphoid population withPositive for TCR. He had negative PET scan for lymphadenopathy. 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 I discussed the plan of systemic chemotherapy. Patient's case was discussed at tumor Board's. As per pathologist cyclic neutropenia can be assisted with T-cell lymphoid hyperplasia and bone marrow. He does not have clinical B symptoms and his hemoglobin has improved now. At He was seen at [facility] clinic and he was thought to have T-cell LGL. He is on methotrexate. Tolerating well. Patient's questions were sought and answered to his satisfaction. He agreed with the plan. PLAN:   Continue methotrexate. I reviewed the recent lab work   Clinically he is doing really well  RTc 6 months with labs      I spent more than 40 minutes examining, evaluating, reviewing data and counseling the patient. Greater than 50% of that time was spent face-to-face with the patient in counseling and coordinating her care.     Marilou Narayanan MD  Hematology/Oncology    CC: MELISSA Weller - CNP

## 2020-07-20 ENCOUNTER — HOSPITAL ENCOUNTER (OUTPATIENT)
Age: 70
Setting detail: SPECIMEN
Discharge: HOME | End: 2020-07-20
Payer: MEDICARE

## 2020-07-21 ENCOUNTER — TELEPHONE (OUTPATIENT)
Dept: SPIRITUAL SERVICES | Age: 70
End: 2020-07-21

## 2020-07-21 LAB
CREATININE URINE: 28.9 MG/DL (ref 39–259)
MICROALBUMIN/CREAT 24H UR: <12 MG/L
MICROALBUMIN/CREAT UR-RTO: ABNORMAL MCG/MG CREAT

## 2020-07-21 NOTE — TELEPHONE ENCOUNTER
Writer spoke to patient and he requested that ACP documents be mailed to him. The Spiritual Care Office will call in a week or so to discuss them.

## 2020-07-23 ENCOUNTER — TELEPHONE (OUTPATIENT)
Dept: GASTROENTEROLOGY | Age: 70
End: 2020-07-23

## 2020-07-23 NOTE — TELEPHONE ENCOUNTER
Attempt # 1 sent letter to schedule screening colonoscopy .  Est patient Dr Heath Walters last seen 9/11/2017

## 2020-07-29 ENCOUNTER — TELEPHONE (OUTPATIENT)
Dept: SPIRITUAL SERVICES | Age: 70
End: 2020-07-29

## 2020-07-29 NOTE — TELEPHONE ENCOUNTER
Writer left message for patient to call the [address] [place] [address] if he had received the ACP documents yet.

## 2020-08-03 ENCOUNTER — HOSPITAL ENCOUNTER (OUTPATIENT)
Age: 70
Setting detail: SPECIMEN
Discharge: HOME | End: 2020-08-03
Payer: MEDICARE

## 2020-08-03 LAB
CHOLESTEROL/HDL RATIO: 1.9
CHOLESTEROL: 111 MG/DL
ESTIMATED AVERAGE GLUCOSE: 123 MG/DL
HBA1C MFR BLD: 5.9 % (ref 4–6)
HDLC SERPL-MCNC: 60 MG/DL
LDL CHOLESTEROL: 38 MG/DL (ref 0–130)
THYROXINE, FREE: 1.87 NG/DL (ref 0.93–1.7)
TRIGL SERPL-MCNC: 65 MG/DL
TSH SERPL DL<=0.05 MIU/L-ACNC: 0.06 MIU/L (ref 0.3–5)
VLDLC SERPL CALC-MCNC: NORMAL MG/DL (ref 1–30)

## 2020-08-05 ENCOUNTER — TELEPHONE (OUTPATIENT)
Dept: SPIRITUAL SERVICES | Age: 70
End: 2020-08-05

## 2020-08-05 NOTE — TELEPHONE ENCOUNTER
[facility]    Advance Care Planning Clinical Specialist  Conversation Note      Date of ACP Conversation: 8/5/2020    Conversation Conducted with: \"Patient with Decision Making Capacity\"    ACP Clinical Specialist: Sherin Morrissey     *Note - Patient will review ACP documents and call the Spiritual Care Office with any questions. This referral will be closed.     Follow-up plan:    [] Schedule follow-up conversation to continue planning  [] Referred individual to Provider for additional questions/concerns   [] Advised patient/agent/surrogate to review completed ACP document and update if needed with changes in condition, patient preferences or care setting    [x] This note routed to one or more involved healthcare providers    [unfilled]

## 2020-08-24 RX ORDER — METHOTREXATE 2.5 MG/1
TABLET ORAL
Qty: 32 TABLET | Refills: 0 | Status: SHIPPED | OUTPATIENT
Start: 2020-08-24 | End: 2020-09-21

## 2020-09-21 RX ORDER — METHOTREXATE 2.5 MG/1
TABLET ORAL
Qty: 32 TABLET | Refills: 0 | Status: SHIPPED | OUTPATIENT
Start: 2020-09-21 | End: 2020-10-22 | Stop reason: SDUPTHER

## 2020-10-22 RX ORDER — METHOTREXATE 2.5 MG/1
TABLET ORAL
Qty: 32 TABLET | Refills: 2 | Status: ON HOLD | OUTPATIENT
Start: 2020-10-22 | End: 2020-12-04 | Stop reason: SDUPTHER

## 2020-10-23 RX ORDER — METHOTREXATE 2.5 MG/1
TABLET ORAL
Qty: 32 TABLET | Refills: 0 | Status: SHIPPED | OUTPATIENT
Start: 2020-10-23 | End: 2021-02-08

## 2020-10-31 NOTE — TELEPHONE ENCOUNTER
Writer spoke to pt over the phone in regards to scheduling colon GI referral/colon recall. Pt is not on any blood thinners. No hx CHF/resp failure requiring oxygen; no recent hx heart attack/stroke. Pt is sched w/ Parisa at [facility] Sat 11/21/20 @ 10:15am proc time, 8:15am arrival time. Dulcolax/Miralax orders will be sent to [facility] on [place], [place]. Bowel prep instructions given to pt over the phone and hard copy mailed to pt's home address. Pt instructed he will need a . Writer will call pt back w/ covid testing date & time d/t surgery scheduling closed at time of scheduling. Pt voices his understanding.

## 2020-11-02 RX ORDER — POLYETHYLENE GLYCOL 3350 17 G/17G
POWDER, FOR SOLUTION ORAL
Qty: 238 G | Refills: 0 | Status: ON HOLD | OUTPATIENT
Start: 2020-11-02 | End: 2020-12-04 | Stop reason: ALTCHOICE

## 2020-11-02 NOTE — TELEPHONE ENCOUNTER
Writer spoke to pt over the phone informing pt he has been sched for covid testing at [facility] 11/17/20 @ 9:10am.  Pt given address, telephone number and instructed where to report. Pt voices his understanding.

## 2020-11-17 ENCOUNTER — HOSPITAL ENCOUNTER (OUTPATIENT)
Dept: PREADMISSION TESTING | Age: 70
Setting detail: SPECIMEN
Discharge: HOME | End: 2020-11-21
Payer: MEDICARE

## 2020-11-17 PROCEDURE — U0003 INFECTIOUS AGENT DETECTION BY NUCLEIC ACID (DNA OR RNA); SEVERE ACUTE RESPIRATORY SYNDROME CORONAVIRUS 2 (SARS-COV-2) (CORONAVIRUS DISEASE [COVID-19]), AMPLIFIED PROBE TECHNIQUE, MAKING USE OF HIGH THROUGHPUT TECHNOLOGIES AS DESCRIBED BY CMS-2020-01-R: HCPCS

## 2020-11-20 ENCOUNTER — TELEPHONE (OUTPATIENT)
Dept: GASTROENTEROLOGY | Age: 70
End: 2020-11-20

## 2020-11-20 LAB — SARS-COV-2, NAA: NOT DETECTED

## 2020-11-20 NOTE — TELEPHONE ENCOUNTER
Pt called at 858 am to cancel tomorrow's (Sat 11/21/20) colonoscopy. Pt requesting call back to discuss.

## 2020-11-30 ENCOUNTER — HOSPITAL ENCOUNTER (OUTPATIENT)
Dept: PREADMISSION TESTING | Age: 70
Setting detail: SPECIMEN
Discharge: HOME | End: 2020-12-04
Payer: MEDICARE

## 2020-11-30 PROCEDURE — U0003 INFECTIOUS AGENT DETECTION BY NUCLEIC ACID (DNA OR RNA); SEVERE ACUTE RESPIRATORY SYNDROME CORONAVIRUS 2 (SARS-COV-2) (CORONAVIRUS DISEASE [COVID-19]), AMPLIFIED PROBE TECHNIQUE, MAKING USE OF HIGH THROUGHPUT TECHNOLOGIES AS DESCRIBED BY CMS-2020-01-R: HCPCS

## 2020-11-30 NOTE — TELEPHONE ENCOUNTER
Sonido OROURKE confirming 930AM arrival time for 12/4/20. Writer spoke with Christiano Carver and confirmed he is correct.

## 2020-12-03 ENCOUNTER — ANESTHESIA EVENT (OUTPATIENT)
Dept: ENDOSCOPY | Age: 70
End: 2020-12-03
Payer: MEDICARE

## 2020-12-03 LAB — SARS-COV-2, NAA: NOT DETECTED

## 2020-12-04 ENCOUNTER — HOSPITAL ENCOUNTER (OUTPATIENT)
Age: 70
Setting detail: OUTPATIENT SURGERY
Discharge: HOME | End: 2020-12-04
Attending: INTERNAL MEDICINE | Admitting: INTERNAL MEDICINE
Payer: MEDICARE

## 2020-12-04 ENCOUNTER — TELEPHONE (OUTPATIENT)
Dept: GASTROENTEROLOGY | Age: 70
End: 2020-12-04

## 2020-12-04 ENCOUNTER — ANESTHESIA (OUTPATIENT)
Dept: ENDOSCOPY | Age: 70
End: 2020-12-04
Payer: MEDICARE

## 2020-12-04 ENCOUNTER — TELEPHONE (OUTPATIENT)
Dept: INTERNAL MEDICINE | Age: 70
End: 2020-12-04

## 2020-12-04 VITALS
RESPIRATION RATE: 15 BRPM | HEART RATE: 101 BPM | HEIGHT: 65 IN | WEIGHT: 185 LBS | TEMPERATURE: 97.1 F | SYSTOLIC BLOOD PRESSURE: 160 MMHG | OXYGEN SATURATION: 98 % | BODY MASS INDEX: 30.82 KG/M2 | DIASTOLIC BLOOD PRESSURE: 90 MMHG

## 2020-12-04 VITALS — SYSTOLIC BLOOD PRESSURE: 139 MMHG | OXYGEN SATURATION: 97 % | DIASTOLIC BLOOD PRESSURE: 77 MMHG

## 2020-12-04 LAB
GLUCOSE BLD-MCNC: 105 MG/DL (ref 75–110)
GLUCOSE BLD-MCNC: 95 MG/DL (ref 75–110)

## 2020-12-04 PROCEDURE — 88305 TISSUE EXAM BY PATHOLOGIST: CPT

## 2020-12-04 PROCEDURE — 82947 ASSAY GLUCOSE BLOOD QUANT: CPT

## 2020-12-04 PROCEDURE — 7100000000 HC PACU RECOVERY - FIRST 15 MIN: Performed by: INTERNAL MEDICINE

## 2020-12-04 PROCEDURE — 6360000002 HC RX W HCPCS: Performed by: NURSE ANESTHETIST, CERTIFIED REGISTERED

## 2020-12-04 PROCEDURE — 2500000003 HC RX 250 WO HCPCS: Performed by: NURSE ANESTHETIST, CERTIFIED REGISTERED

## 2020-12-04 PROCEDURE — 2709999900 HC NON-CHARGEABLE SUPPLY: Performed by: INTERNAL MEDICINE

## 2020-12-04 PROCEDURE — 7100000001 HC PACU RECOVERY - ADDTL 15 MIN: Performed by: INTERNAL MEDICINE

## 2020-12-04 PROCEDURE — 2580000003 HC RX 258: Performed by: ANESTHESIOLOGY

## 2020-12-04 PROCEDURE — 45380 COLONOSCOPY AND BIOPSY: CPT | Performed by: INTERNAL MEDICINE

## 2020-12-04 PROCEDURE — 3700000000 HC ANESTHESIA ATTENDED CARE: Performed by: INTERNAL MEDICINE

## 2020-12-04 PROCEDURE — 3609010300 HC COLONOSCOPY W/BIOPSY SINGLE/MULTIPLE: Performed by: INTERNAL MEDICINE

## 2020-12-04 PROCEDURE — 45385 COLONOSCOPY W/LESION REMOVAL: CPT | Performed by: INTERNAL MEDICINE

## 2020-12-04 PROCEDURE — 3700000001 HC ADD 15 MINUTES (ANESTHESIA): Performed by: INTERNAL MEDICINE

## 2020-12-04 RX ORDER — SODIUM CHLORIDE 0.9 % (FLUSH) 0.9 %
10 SYRINGE (ML) INJECTION PRN
Status: DISCONTINUED | OUTPATIENT
Start: 2020-12-04 | End: 2020-12-04 | Stop reason: HOSPADM

## 2020-12-04 RX ORDER — LIDOCAINE HYDROCHLORIDE 10 MG/ML
1 INJECTION, SOLUTION EPIDURAL; INFILTRATION; INTRACAUDAL; PERINEURAL
Status: DISCONTINUED | OUTPATIENT
Start: 2020-12-04 | End: 2020-12-04 | Stop reason: HOSPADM

## 2020-12-04 RX ORDER — LABETALOL HYDROCHLORIDE 5 MG/ML
5 INJECTION, SOLUTION INTRAVENOUS EVERY 10 MIN PRN
Status: DISCONTINUED | OUTPATIENT
Start: 2020-12-04 | End: 2020-12-04 | Stop reason: HOSPADM

## 2020-12-04 RX ORDER — SODIUM CHLORIDE 9 MG/ML
INJECTION, SOLUTION INTRAVENOUS CONTINUOUS
Status: DISCONTINUED | OUTPATIENT
Start: 2020-12-04 | End: 2020-12-04 | Stop reason: HOSPADM

## 2020-12-04 RX ORDER — PROMETHAZINE HYDROCHLORIDE 25 MG/ML
6.25 INJECTION, SOLUTION INTRAMUSCULAR; INTRAVENOUS
Status: DISCONTINUED | OUTPATIENT
Start: 2020-12-04 | End: 2020-12-04 | Stop reason: HOSPADM

## 2020-12-04 RX ORDER — ONDANSETRON 2 MG/ML
4 INJECTION INTRAMUSCULAR; INTRAVENOUS
Status: DISCONTINUED | OUTPATIENT
Start: 2020-12-04 | End: 2020-12-04 | Stop reason: HOSPADM

## 2020-12-04 RX ORDER — HYDRALAZINE HYDROCHLORIDE 20 MG/ML
5 INJECTION INTRAMUSCULAR; INTRAVENOUS EVERY 10 MIN PRN
Status: DISCONTINUED | OUTPATIENT
Start: 2020-12-04 | End: 2020-12-04 | Stop reason: HOSPADM

## 2020-12-04 RX ORDER — DIPHENHYDRAMINE HYDROCHLORIDE 50 MG/ML
12.5 INJECTION INTRAMUSCULAR; INTRAVENOUS
Status: DISCONTINUED | OUTPATIENT
Start: 2020-12-04 | End: 2020-12-04 | Stop reason: HOSPADM

## 2020-12-04 RX ORDER — 0.9 % SODIUM CHLORIDE 0.9 %
500 INTRAVENOUS SOLUTION INTRAVENOUS
Status: DISCONTINUED | OUTPATIENT
Start: 2020-12-04 | End: 2020-12-04 | Stop reason: HOSPADM

## 2020-12-04 RX ORDER — LIDOCAINE HYDROCHLORIDE 10 MG/ML
INJECTION, SOLUTION EPIDURAL; INFILTRATION; INTRACAUDAL; PERINEURAL PRN
Status: DISCONTINUED | OUTPATIENT
Start: 2020-12-04 | End: 2020-12-04 | Stop reason: SDUPTHER

## 2020-12-04 RX ORDER — PROPOFOL 10 MG/ML
INJECTION, EMULSION INTRAVENOUS CONTINUOUS PRN
Status: DISCONTINUED | OUTPATIENT
Start: 2020-12-04 | End: 2020-12-04 | Stop reason: SDUPTHER

## 2020-12-04 RX ORDER — SODIUM CHLORIDE 0.9 % (FLUSH) 0.9 %
10 SYRINGE (ML) INJECTION EVERY 12 HOURS SCHEDULED
Status: DISCONTINUED | OUTPATIENT
Start: 2020-12-04 | End: 2020-12-04 | Stop reason: HOSPADM

## 2020-12-04 RX ADMIN — PROPOFOL 300 MCG/KG/MIN: 10 INJECTION, EMULSION INTRAVENOUS at 08:55

## 2020-12-04 RX ADMIN — LIDOCAINE HYDROCHLORIDE 50 MG: 10 INJECTION, SOLUTION EPIDURAL; INFILTRATION; INTRACAUDAL; PERINEURAL at 08:55

## 2020-12-04 RX ADMIN — SODIUM CHLORIDE: 9 INJECTION, SOLUTION INTRAVENOUS at 08:07

## 2020-12-04 ASSESSMENT — PULMONARY FUNCTION TESTS
PIF_VALUE: 1

## 2020-12-04 ASSESSMENT — PAIN SCALES - GENERAL
PAINLEVEL_OUTOF10: 2
PAINLEVEL_OUTOF10: 0
PAINLEVEL_OUTOF10: 2
PAINLEVEL_OUTOF10: 0

## 2020-12-04 ASSESSMENT — PAIN DESCRIPTION - DESCRIPTORS
DESCRIPTORS: CRAMPING
DESCRIPTORS: CRAMPING

## 2020-12-04 ASSESSMENT — PAIN - FUNCTIONAL ASSESSMENT: PAIN_FUNCTIONAL_ASSESSMENT: 0-10

## 2020-12-04 ASSESSMENT — PAIN DESCRIPTION - LOCATION
LOCATION: ABDOMEN
LOCATION: ABDOMEN

## 2020-12-04 NOTE — ANESTHESIA POSTPROCEDURE EVALUATION
Department of Anesthesiology  Postprocedure Note    Patient: Hazel Waite  MRN: [id number]  YOB: 1950  Date of evaluation: 12/4/2020  Time:  10:59 AM     Procedure Summary     Date:  12/04/20 Room / Location:  [address] [place] [address] ENDO 02 / [facility] ENDO    Anesthesia Start:  8911 Anesthesia Stop:  5059    Procedure:  COLONOSCOPY WITH BIOPSY (N/A ) Diagnosis:  (SCREENING FOR MALIGNANT NEOPLASM OF COLON, HX OF COLON POLYPS (PAT PER ANESTHESIA ON ADMIT))    Surgeon:  Deuce Bey MD Responsible Provider:  Thea Nissen, MD    Anesthesia Type:  MAC ASA Status:  3          Anesthesia Type: MAC    James Phase I: James Score: 10    James Phase II:      Last vitals: Reviewed and per EMR flowsheets.        Anesthesia Post Evaluation    Comments: POST- ANESTHESIA EVALUATION       Pt Name: Hazel Waite  MRN: [id number]  YOB: 1950  Date of evaluation: 12/4/2020  Time:  11:00 AM      BP (!) 160/90   Pulse 101   Temp 97.1 °F (36.2 °C)   Resp 15   Ht 5' 5\" (1.651 m)   Wt 185 lb (83.9 kg)   SpO2 98%   BMI 30.79 kg/m²      Consciousness Level  Awake  Cardiopulmonary Status  Stable  Pain Adequately Treated YES  Nausea / Vomiting  NO  Adequate Hydration  YES  Anesthesia Related Complications NONE      Electronically signed by Thea Nissen, MD on 12/4/2020 at 11:00 AM

## 2020-12-04 NOTE — OP NOTE
COLONOSCOPY    DATE OF PROCEDURE: 12/4/2020    SURGEON: Chantel Pelayo MD    ASSISTANT: None    PREOPERATIVE DIAGNOSIS: Polyp surveillance colonoscopy    POSTOPERATIVE DIAGNOSIS: 5 mm polyp in the transverse colon. Prominent ileocecal valve. A small polyp nearby ileocecal valve. OPERATION: Total colonoscopy snare polypectomy transverse colon  Biopsy of prominent ileocecal valve, excision of polyp with biopsy forceps from nearby ileocecal valve      ANESTHESIA: MAC    ESTIMATED BLOOD LOSS: None    COMPLICATIONS: None     SPECIMENS:  Was Obtained: Polyp transverse colon, biopsies of prominent ileocecal valve/lower lip of the valve, polyp nearby ileocecal valve    HISTORY: The patient is a 72y.o. year old male with history of above preop diagnosis. I recommended colonoscopy with possible biopsy or polypectomy and I explained the risk, benefits, expected outcome, and alternatives to the procedure. Risks included but are not limited to bleeding, infection, respiratory distress, hypotension, and perforation of the colon and possibility of missing a lesion. The patient understands and is in agreement. PROCEDURE:  The patient's SPO2 remained above 90% throughout the procedure. Digital rectal exam was normal.  The colonoscope was inserted through the anus into the rectum and advanced under direct vision to the cecum without difficulty. Terminal ileum was examined for approximately 2 inches. The prep was good. Findings:  Terminal ileum: normal    Cecum/Ascending colon: abnormal: Patient had prominent lower lip of ileocecal valve. It looks erythematous, very prominent. Multiple biopsies taken from this area to rule out adenoma.   Also in the past patient had similar appearance    Also nearby ileocecal valve there is a polyp which is about 2 to 3 mm excised with biopsy forceps    Transverse colon: abnormal: In the proximal transverse colon towards the hepatic flexure there was a polyp which is about 5 mm, sessile. This is removed with cold snare. Descending/Sigmoid colon: normal, few diverticuli. Rectum/Anus: examined in normal and retroflexed positions and was normal    Withdrawal Time was (minutes): 12          The colon was decompressed and the scope was removed. The patient tolerated the procedure without unusual events. During the procedure, the patient's blood pressure, pulse and oxygen saturation remained stable and documented. No unusual events occurred during the procedure. Patient was transferred to recovery room and will be discharged when criteria is met. Recommendations/Plan:   1. F/U Biopsies  2. F/U In Office as instructed  3. Discussed with the family  4. High fiber diet   5. Precautions to avoid constipation     Next colonoscopy: 3 years. If Colonoscopy is less than 10 years the recommended reason is due:polyps, patient has recurrent polyps.     Electronically signed by Jonathan Hilario MD  on 12/4/2020 at 9:36 AM

## 2020-12-04 NOTE — ANESTHESIA PRE PROCEDURE
Department of Anesthesiology  Preprocedure Note       Name:  Elsie Ibarra   Age:  72 y.o.  :  1950                                          MRN:  [id number]         Date:  2020      Surgeon: Gilma De Luna):  Abraham Frias MD    Procedure: Procedure(s):  COLORECTAL CANCER SCREENING, HIGH RISK    Medications prior to admission:   Prior to Admission medications    Medication Sig Start Date End Date Taking? Authorizing Provider   metFORMIN (GLUCOPHAGE) 500 MG tablet TAKE 1 TABLET TWICE DAILY  WITH MEALS 8/10/20  Yes Adah Devoid, APRN - CNP   levothyroxine (SYNTHROID) 150 MCG tablet Take 1 tab daily, on Mon and Thurs, take 0.5 tabs 20  Yes Adah Devoid, APRN - CNP   atorvastatin (LIPITOR) 10 MG tablet TAKE 1 TABLET BY MOUTH ONCE DAILY 20  Yes Adah Devoid, APRN - CNP   enalapril (VASOTEC) 20 MG tablet TAKE 1 TABLET TWICE A DAY 20  Yes Adah Devoid, APRN - CNP   hydroCHLOROthiazide (HYDRODIURIL) 25 MG tablet TAKE 1 TABLET ONCE DAILY 20  Yes Adah Devoid, APRN - CNP   omeprazole (PRILOSEC) 40 MG delayed release capsule TAKE 1 CAPSULE DAILY 20  Yes Adah Devoid, APRN - CNP   hydrocortisone 2.5 % ointment Apply topically 2 times daily.  19  Yes Adah Devoid, APRN - CNP   fluticasone (FLONASE) 50 MCG/ACT nasal spray 2 sprays by Nasal route daily  Patient taking differently: 2 sprays by Nasal route daily Using PRN 19  Yes Adah Devoid, APRN - CNP   methotrexate ([address]) 2.5 MG chemo tablet TAKE 8 TABLETS BY MOUTH ONCE A WEEK 10/23/20   Yinka Multani MD   OneTouch Delica Lancets 50K MISC USE TO TEST BLOOD SUGAR THREE TO FOUR TIMES DAILY AND AS NEEDED 20   Adah Devoid, APRN - CNP   ONETOUCH ULTRA strip USE STRIP TO CHECK GLUCOSE ONCE DAILY AS NEEDED 20   MELISSA Lee CNP       Current medications:    Current Facility-Administered Medications   Medication Dose Route Frequency Provider Last Rate Last Dose    0.9 % sodium chloride infusion   Intravenous Continuous Jordan Le 0.25     Years: 30.00     Pack years: 7.50     Types: Cigarettes     Last attempt to quit: 1972     Years since quittin.3    Smokeless tobacco: Never Used   Substance Use Topics    Alcohol use: No                                Counseling given: Not Answered      Vital Signs (Current):   Vitals:    20 0751   BP: (!) 153/93   Pulse: 81   Resp: 16   Temp: 97.5 °F (36.4 °C)   TempSrc: Infrared   SpO2: 100%   Weight: 185 lb (83.9 kg)   Height: 5' 5\" (1.651 m)                                              BP Readings from Last 3 Encounters:   20 (!) 153/93   20 112/68   20 127/82       NPO Status: Time of last liquid consumption:                         Time of last solid consumption:                         Date of last liquid consumption: 20                        Date of last solid food consumption: 20    BMI:   Wt Readings from Last 3 Encounters:   20 185 lb (83.9 kg)   20 187 lb (84.8 kg)   20 189 lb (85.7 kg)     Body mass index is 30.79 kg/m². CBC:   Lab Results   Component Value Date    WBC 6.8 2020    RBC 4.83 2020    HGB 14.8 2020    HCT 43.9 2020    MCV 90.9 2020    RDW 14.2 2020     2020       CMP:   Lab Results   Component Value Date     2020    K 3.7 2020    CL 99 2020    CO2 28 2020    BUN 16 2020    CREATININE 0.82 2020    GFRAA >60 2020    LABGLOM >60 2020    GLUCOSE 129 2020    PROT 6.9 2020    CALCIUM 9.8 2020    BILITOT 0.45 2020    ALKPHOS 91 2020    AST 24 2020    ALT 30 2020       POC Tests: No results for input(s): POCGLU, POCNA, POCK, POCCL, POCBUN, POCHEMO, POCHCT in the last 72 hours.     Coags:   Lab Results   Component Value Date    PROTIME 12.4 2020    INR 0.9 2020    APTT 27.8 2020       HCG (If Applicable): No results found for: PREGTESTUR, PREGSERUM, HCG,

## 2020-12-04 NOTE — H&P
HISTORY and [facility] [id number]       NAME:  Marilee Platt  MRN: [id number]   YOB: 1950   Date: 12/4/2020   Age: 72 y.o. Gender: male       COMPLAINT AND PRESENT HISTORY:     Marilee Platt is 72 y.o.,   male, having a Screening Colonoscopy. Prior colonoscopy [facility] 2017 with remove polyps. Patient has hx of Colon Polyps. Patient denies any  FH of Colon. Patient reports no changes in bowel habits. No GI /Rectal bleeding, experiencing red/ black/ BRBPR stools. Patient denies any other GI symptoms. No nausea / vomiting. No diarrhea or constipation. No abdominal pains or cramping. Patient denies any Dysphagia. Pt has hx of GERD pt reports he has no S/S so, he sopped taking Prilosec and  he is on Methotrexate for Cancer Leukemia.   Pt denies fever/chills, chest pain, SOB, headache , palpitation   Pt denies any problem with anesthesia, no Hx of MRSA  Pt reports his BM is clear liquid today after he finished his colon prep yesterday  Pt Npo since the past midnight , pt took Hydrochlorothiazide, and Metformin today with sip of water    PAST MEDICAL HISTORY     Past Medical History:   Diagnosis Date    Arthritis     Cancer ([facility] [address]     leukemia    Colon polyps 07/03/2017    colonoscopy at [facility] History of colon polyps 2014, 2017    Hypothyroidism 11/2/2012    Iron deficiency     Neutropenia ([facility] [address]     Osteoarthritis     Prediabetes     has lost weight    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    Unspecified essential hypertension 11/2/2012       SURGICAL HISTORY       Past Surgical History:   Procedure Laterality Date    COLONOSCOPY  06-11-14    tubular adenoma, prominent IC valve    COLONOSCOPY  07/03/2017    very prominent IC valve-negative pathology, polyps transverse colon-tubular adenoma, and lower sigmoid colon-hyperplastic polyp    UPPER GASTROINTESTINAL ENDOSCOPY  6/11/2014    normal       FAMILY HISTORY       Family History   Problem Relation Age of Onset  Heart Disease Mother     Diabetes Mother     Heart Disease Father     Diabetes Brother     Cancer Brother         prostate       SOCIAL HISTORY       Social History     Socioeconomic History    Marital status:      Spouse name: Not on file    Number of children: Not on file    Years of education: Not on file    Highest education level: Not on file   Occupational History    Not on file   Social Needs    Financial resource strain: Not on file    Food insecurity     Worry: Not on file     Inability: Not on file    Transportation needs     Medical: Not on file     Non-medical: Not on file   Tobacco Use    Smoking status: Former Smoker     Packs/day: 0.25     Years: 30.00     Pack years: 7.50     Types: Cigarettes     Last attempt to quit: 1972     Years since quittin.3    Smokeless tobacco: Never Used   Substance and Sexual Activity    Alcohol use: No    Drug use: No     Types: Marijuana     Comment: quit 19    Sexual activity: Not on file   Lifestyle    Physical activity     Days per week: Not on file     Minutes per session: Not on file    Stress: Not on file   Relationships    Social connections     Talks on phone: Not on file     Gets together: Not on file     Attends Church service: Not on file     Active member of club or organization: Not on file     Attends meetings of clubs or organizations: Not on file     Relationship status: Not on file    Intimate partner violence     Fear of current or ex partner: Not on file     Emotionally abused: Not on file     Physically abused: Not on file     Forced sexual activity: Not on file   Other Topics Concern    Not on file   Social History Narrative    Not on file           REVIEW OF SYSTEMS      Allergies   Allergen Reactions    Morphine Other (See Comments)     Veins in arms turned blue       No current facility-administered medications on file prior to encounter.       Current Outpatient Medications on File Prior to Encounter   Medication Sig Dispense Refill    bisacodyl (DULCOLAX) 5 MG EC tablet TAKE 2 TABS AT 9 AM THE DAY PRIOR TO COLONOSCOPY 2 tablet 0    polyethylene glycol (GLYCOLAX) 17 GM/SCOOP powder Use as directed by following your patient instructions given by office. 238 g 0    methotrexate (RHEUMATREX) 2.5 MG chemo tablet TAKE 8 TABLETS BY MOUTH ONCE A WEEK 32 tablet 0    methotrexate (RHEUMATREX) 2.5 MG chemo tablet Take 8 tablets PO once a week 32 tablet 2    OneTouch Delica Lancets 79R MISC USE TO TEST BLOOD SUGAR THREE TO FOUR TIMES DAILY AND AS NEEDED 200 each 0    metFORMIN (GLUCOPHAGE) 500 MG tablet TAKE 1 TABLET TWICE DAILY  WITH MEALS 180 tablet 3    levothyroxine (SYNTHROID) 150 MCG tablet Take 1 tab daily, on Mon and Thurs, take 0.5 tabs 90 tablet 0    atorvastatin (LIPITOR) 10 MG tablet TAKE 1 TABLET BY MOUTH ONCE DAILY 90 tablet 1    enalapril (VASOTEC) 20 MG tablet TAKE 1 TABLET TWICE A  tablet 1    hydroCHLOROthiazide (HYDRODIURIL) 25 MG tablet TAKE 1 TABLET ONCE DAILY 90 tablet 1    omeprazole (PRILOSEC) 40 MG delayed release capsule TAKE 1 CAPSULE DAILY 90 capsule 1    ONETOUCH ULTRA strip USE STRIP TO CHECK GLUCOSE ONCE DAILY AS NEEDED 50 each 3    hydrocortisone 2.5 % ointment Apply topically 2 times daily. 28.35 g 0    fluticasone (FLONASE) 50 MCG/ACT nasal spray 2 sprays by Nasal route daily (Patient taking differently: 2 sprays by Nasal route daily Using PRN) 1 Bottle 3       Negative except for what is mentioned in the HPI. GENERAL PHYSICAL EXAM     Vitals: see nursing flow sheet for vital sings    GENERAL APPEARANCE:   Johnathan Baumgarten is 72 y.o.,  male,nourished, conscious, alert. Does not appear to be distress or pain at this time. SKIN:  Warm, dry, no cyanosis or jaundice. HEAD:  Normocephalic, atraumatic, no swelling or tenderness. EYES:  Pupils equal, reactive to light.            EARS:  No discharge, no marked hearing loss. NOSE:  No rhinorrhea, epistaxis or septal deformity. THROAT:  Not congested. No ulceration bleeding or discharge. NECK:  No stiffness, trachea central.  No palpable masses or L.N.                 CHEST:  Symmetrical and equal on expansion. HEART:  RRR S1 > S2. No audible murmurs or gallops. LUNGS:  Equal on expansion, normal breath sounds. No adventitious sounds. ABDOMEN:   Soft on palpation. No dysphagia, No localized tenderness. No guarding or rigidity. No palpable hepatosplenomegaly. Bowel sounds present              LYMPHATICS:  No palpable cervical lymphadenopathy. LOCOMOTOR, BACK AND SPINE:  No tenderness or deformities. EXTREMITIES:  Symmetrical, no pretibial edema. No calf tenderness,  No discoloration or ulcerations. NEUROLOGIC:  The patient is conscious, alert, oriented,Cranial nerve II-XII intact, taste and smell were not examined. No apparent focal sensory or motor deficits.              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

## 2020-12-04 NOTE — DISCHARGE INSTR - DIET
 Good nutrition is important when healing from an illness, injury, or surgery. Follow any nutrition recommendations given to you during your hospital stay.  If you were given an oral nutrition supplement while in the hospital, continue to take this supplement at home. You can take it with meals, in-between meals, and/or before bedtime. These supplements can be purchased at most local grocery stores, pharmacies, and chain super-stores.  If you have any questions about your diet or nutrition, call the hospital and ask for the dietitian.  No spicy, greasy, fatty foods. High-Fiber Diet     What Is Fiber? Dietary fiber is a form of carbohydrate found in plants that cannot be digested by humans. All plants contain fiber, including fruits, vegetables, grains, and legumes. Fiber is often classified into two categories: soluble and insoluble. · Soluble fiber draws water into the bowel and can help slow digestion. Examples of foods that are high in soluble fiber include oatmeal, oat bran, barley, legumes (eg, beans and peas), apples, and strawberries. · Insoluble fiber speeds digestion and can add bulk to the stool. Examples of foods that are high in insoluble fiber include whole-wheat products, wheat bran, cauliflower, green beans, and potatoes. Why Follow a High-Fiber Diet? A high-fiber diet is often recommended to prevent and treat constipation , hemorrhoids , diverticulitis , and irritable bowel syndrome . Eating a high-fiber diet can also help improve your cholesterol levels, lower your risk of coronary heart disease , reduce your risk of type 2 diabetes , and lower your weight. For people with type 1 or 2 diabetes, a high-fiber diet can also help stabilize blood sugar levels. How Much Fiber Should I Eat? A high-fiber diet should contain  20-35 grams  of fiber a day.  This is actually the amount recommended for the general adult population; however, most Americans eat only 15 grams of fiber per day. Digestion of Fiber   Eating a higher fiber diet than usual can take some getting used to by your body's digestive system. To avoid the side effects of sudden increases in dietary fiber (eg, gas, cramping, bloating, and diarrhea), increase fiber gradually and be sure to drink plenty of fluids every day. Tips for Increasing Fiber Intake   · Whenever possible, choose whole grains over refined grains (eg, brown rice instead of white rice, whole-wheat bread instead of white bread). · Include a variety of grains in your diet, such as wheat, rye, barley, oats, quinoa, and bulgur. · Eat more vegetarian-based meals. Here are some ideas: black bean burgers, eggplant lasagna, and veggie tofu stir-arreola. · Choose high-fiber snacks, such as fruits, popcorn, whole-grain crackers, and nuts. · Make whole-grain cereal or whole-grain toast part of your daily breakfast regime. · When eating out, whether ordering a sandwich or dinner, ask for extra vegetables. · When baking, replace part of the white flour with whole-wheat flour. Whole-wheat flour is particularly easy to incorporate into a recipe. High-Fiber Diet Eating Guide   Food Category   Foods Recommended   Notes   Grains   Whole-grain breads, muffins, bagels, or neha bread Rye bread Whole-wheat crackers or crisp breads Whole-grain or bran cereals Oatmeal, oat bran, or grits Wheat germ Whole-wheat pasta and brown rice   Read the ingredients list on food labels. Look for products that list \"whole\" as the first ingredient (eg, whole-wheat, whole oats). Choose cereals with at least 2 grams of fiber per serving.    Vegetables   All vegetables, especially asparagus, bean sprouts, broccoli, [place] sprouts, cabbage, carrots, cauliflower, celery, corn, greens, green beans, green pepper, onions, peas, potatoes (with skin), snow peas, spinach, squash, sweet potatoes, tomatoes, zucchini   For maximum fiber intake, eat the peels of fruits and vegetablesjust be sure to wash them well first.   Fruits   All fruits, especially apples, berries, grapefruits, mangoes, nectarines, oranges, peaches, pears, dried fruits (figs, dates, prunes, raisins)   Choose raw fruits and vegetables over juice, cooked, or cannedraw fruit has more fiber. Dried fruit is also a good source of fiber. Milk   With the exception of yogurt containing inulin (a type of fiber), dairy foods provide little fiber. Add more fiber by topping your yogurt or cottage cheese with fresh fruit, whole grain or bran cereals, nuts, or seeds. Meats and Beans   All beans and peas, especially Garbanzo beans, kidney beans, lentils, lima beans, split peas, and martínez beans All nuts and seeds, especially almonds, peanuts, [place] nuts, cashews, peanut butter, walnuts, sesame and sunflower seeds All meat, poultry, fish, and eggs   Increase fiber in meat dishes by adding martínez beans, kidney beans, black-eyed peas, bran, or oatmeal. If you are following a low-fat diet, use nuts and seeds only in moderation. Fats and Oils   All in moderation   Fats and oils do not provide fiber   Snacks, Sweets, and Condiments   Fruit Nuts Popcorn, whole-wheat pretzels, or trail mix made with dried fruits, nuts, and seeds Cakes, breads, and cookies made with oatmeal or whole-wheat flour   Most snack foods do not provide much fiber. Choose snacks with at least 2 grams of fiber per serving.      Last Reviewed: March 2011 Charles Ricketts MS, MPH, RD   Updated: 3/29/2011

## 2020-12-07 LAB — SURGICAL PATHOLOGY REPORT: NORMAL

## 2020-12-14 ENCOUNTER — OFFICE VISIT (OUTPATIENT)
Dept: GASTROENTEROLOGY | Age: 70
End: 2020-12-14
Payer: MEDICARE

## 2020-12-14 VITALS
HEART RATE: 78 BPM | OXYGEN SATURATION: 95 % | DIASTOLIC BLOOD PRESSURE: 88 MMHG | TEMPERATURE: 97 F | WEIGHT: 185.3 LBS | SYSTOLIC BLOOD PRESSURE: 142 MMHG | HEIGHT: 65 IN | BODY MASS INDEX: 30.87 KG/M2

## 2020-12-14 PROCEDURE — G8484 FLU IMMUNIZE NO ADMIN: HCPCS | Performed by: NURSE PRACTITIONER

## 2020-12-14 PROCEDURE — 1036F TOBACCO NON-USER: CPT | Performed by: NURSE PRACTITIONER

## 2020-12-14 PROCEDURE — 3017F COLORECTAL CA SCREEN DOC REV: CPT | Performed by: NURSE PRACTITIONER

## 2020-12-14 PROCEDURE — 4040F PNEUMOC VAC/ADMIN/RCVD: CPT | Performed by: NURSE PRACTITIONER

## 2020-12-14 PROCEDURE — G8427 DOCREV CUR MEDS BY ELIG CLIN: HCPCS | Performed by: NURSE PRACTITIONER

## 2020-12-14 PROCEDURE — G8417 CALC BMI ABV UP PARAM F/U: HCPCS | Performed by: NURSE PRACTITIONER

## 2020-12-14 PROCEDURE — 1123F ACP DISCUSS/DSCN MKR DOCD: CPT | Performed by: NURSE PRACTITIONER

## 2020-12-14 PROCEDURE — 99213 OFFICE O/P EST LOW 20 MIN: CPT | Performed by: NURSE PRACTITIONER

## 2020-12-14 ASSESSMENT — ENCOUNTER SYMPTOMS
SINUS PAIN: 0
DIARRHEA: 0
CONSTIPATION: 0
BLOOD IN STOOL: 0
VOICE CHANGE: 0
BACK PAIN: 0
TROUBLE SWALLOWING: 0
RESPIRATORY NEGATIVE: 1
WHEEZING: 0
GASTROINTESTINAL NEGATIVE: 1
ANAL BLEEDING: 0
NAUSEA: 0
APNEA: 0
ABDOMINAL PAIN: 0
RECTAL PAIN: 0
SHORTNESS OF BREATH: 0
SINUS PRESSURE: 0
SORE THROAT: 0
VOMITING: 0
ABDOMINAL DISTENTION: 0
CHOKING: 0
COUGH: 0

## 2020-12-14 NOTE — PROGRESS NOTES
GI CLINIC FOLLOW UP    INTERVAL HISTORY:   No referring provider defined for this encounter. Chief Complaint   Patient presents with    Follow-up     patient is here today to f/u on his colonoscopy           HISTORY OF PRESENT ILLNESS:     Patient being seen for colonoscopy follow-up. Patient has history of colon polyps. Colonoscopy prep was good. In the transverse colon a 5 mm polyp, sessile, excised with cold snare. This was a tubular adenoma. Noted to have few diverticuli in the sigmoid colon. Patient noted to have prominent lower lip of the ileocecal valve. Looks erythematous, prominent. Multiple biopsies taken to rule adenoma. These were unremarkable. Also noted to have a 2 to 3 mm polyp near the ileocecal valve. This was removed with biopsy forceps. Histology revealed no pathologic diagnosis. At present patient denies any GI complaints. Bowel movements are satisfactory. No significant constipation or diarrhea. No melena, hematochezia. No abdominal pain, cramping, bloating. No nausea, vomiting. Has good appetite. There is no weight loss. No dysphagia, odynophagia, dyspeptic symptoms. Past Medical,Family, and Social History reviewed and does contribute to the patient presentingcondition. Patient's PMH/PSH,SH,PSYCH Hx, MEDs, ALLERGIES, and ROS were all reviewed and updated in the appropriate sections.     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    Unspecified essential hypertension 11/2/2012       Past Surgical History:   Procedure Laterality Date    BONE MARROW BIOPSY      x2    COLONOSCOPY  06-11-14 tubular adenoma, prominent IC valve    COLONOSCOPY  07/03/2017    very prominent IC valve-negative pathology, polyps transverse colon-tubular adenoma, and lower sigmoid colon-hyperplastic polyp    COLONOSCOPY N/A 12/4/2020    COLONOSCOPY WITH BIOPSY performed by Ambrocio Pederson MD at [facility]  6/11/2014    normal       CURRENT MEDICATIONS:    Current Outpatient Medications:     methotrexate (RHEUMATREX) 2.5 MG chemo tablet, TAKE 8 TABLETS BY MOUTH ONCE A WEEK, Disp: 32 tablet, Rfl: 0    OneTouch Delica Lancets 24V MISC, USE TO TEST BLOOD SUGAR THREE TO FOUR TIMES DAILY AND AS NEEDED, Disp: 200 each, Rfl: 0    metFORMIN (GLUCOPHAGE) 500 MG tablet, TAKE 1 TABLET TWICE DAILY  WITH MEALS, Disp: 180 tablet, Rfl: 3    levothyroxine (SYNTHROID) 150 MCG tablet, Take 1 tab daily, on Mon and Thurs, take 0.5 tabs, Disp: 90 tablet, Rfl: 0    atorvastatin (LIPITOR) 10 MG tablet, TAKE 1 TABLET BY MOUTH ONCE DAILY, Disp: 90 tablet, Rfl: 1    enalapril (VASOTEC) 20 MG tablet, TAKE 1 TABLET TWICE A DAY, Disp: 180 tablet, Rfl: 1    hydroCHLOROthiazide (HYDRODIURIL) 25 MG tablet, TAKE 1 TABLET ONCE DAILY, Disp: 90 tablet, Rfl: 1    omeprazole (PRILOSEC) 40 MG delayed release capsule, TAKE 1 CAPSULE DAILY, Disp: 90 capsule, Rfl: 1    ONETOUCH ULTRA strip, USE STRIP TO CHECK GLUCOSE ONCE DAILY AS NEEDED, Disp: 50 each, Rfl: 3    hydrocortisone 2.5 % ointment, Apply topically 2 times daily. , Disp: 28.35 g, Rfl: 0    fluticasone (FLONASE) 50 MCG/ACT nasal spray, 2 sprays by Nasal route daily (Patient taking differently: 2 sprays by Nasal route daily Using PRN), Disp: 1 Bottle, Rfl: 3    ALLERGIES:   Allergies   Allergen Reactions    Morphine Other (See Comments)     Veins in arms turned blue       FAMILY HISTORY:       Problem Relation Age of Onset    Heart Disease Mother     Diabetes Mother     Heart Disease Father     Diabetes Brother     Cancer Brother         prostate SOCIAL HISTORY:   Social History     Socioeconomic History    Marital status:      Spouse name: Not on file    Number of children: Not on file    Years of education: Not on file    Highest education level: Not on file   Occupational History    Not on file   Social Needs    Financial resource strain: Not on file    Food insecurity     Worry: Not on file     Inability: Not on file    Transportation needs     Medical: Not on file     Non-medical: Not on file   Tobacco Use    Smoking status: Former Smoker     Packs/day: 0.25     Years: 30.00     Pack years: 7.50     Types: Cigarettes     Quit date: 1972     Years since quittin.4    Smokeless tobacco: Never Used   Substance and Sexual Activity    Alcohol use: No    Drug use: Yes     Types: Marijuana     Comment: occasional    Sexual activity: Not on file   Lifestyle    Physical activity     Days per week: Not on file     Minutes per session: Not on file    Stress: Not on file   Relationships    Social connections     Talks on phone: Not on file     Gets together: Not on file     Attends Congregation service: Not on file     Active member of club or organization: Not on file     Attends meetings of clubs or organizations: Not on file     Relationship status: Not on file    Intimate partner violence     Fear of current or ex partner: Not on file     Emotionally abused: Not on file     Physically abused: Not on file     Forced sexual activity: Not on file   Other Topics Concern    Not on file   Social History Narrative    Not on file       REVIEW OF SYSTEMS: A 12-point review of systemswas obtained and pertinent positives and negatives were enumerated above in the history of present illness. All other reviewed systems / symptoms were negative. Review of Systems   Constitutional: Negative. Negative for appetite change, fatigue and unexpected weight change. HENT: Negative. Negative for postnasal drip, sinus pressure, sinus pain, sore throat, trouble swallowing and voice change. Eyes: Negative for visual disturbance (readers). Respiratory: Negative. Negative for apnea, cough, choking, shortness of breath and wheezing. Cardiovascular: Negative. Negative for chest pain, palpitations and leg swelling. Gastrointestinal: Negative. Negative for abdominal distention, abdominal pain, anal bleeding, blood in stool, constipation, diarrhea, nausea, rectal pain and vomiting. Genitourinary: Negative. Negative for difficulty urinating. Musculoskeletal: Positive for arthralgias. Negative for back pain and gait problem. Allergic/Immunologic: Negative for environmental allergies and food allergies. Neurological: Negative. Negative for dizziness, weakness, light-headedness, numbness and headaches. Hematological: Negative. Does not bruise/bleed easily. Psychiatric/Behavioral: Positive for sleep disturbance. The patient is not nervous/anxious. PHYSICAL EXAMINATION: Vital signs reviewed per the nursing documentation. BP (!) 142/88 (Site: Right Upper Arm, Position: Sitting, Cuff Size: Large Adult)   Pulse 78   Temp 97 °F (36.1 °C)   Ht 5' 5\" (1.651 m)   Wt 185 lb 4.8 oz (84.1 kg)   SpO2 95%   BMI 30.84 kg/m²   Body mass index is 30.84 kg/m². Physical Exam  Constitutional:       Appearance: Normal appearance. Eyes:      General: No scleral icterus. Pupils: Pupils are equal, round, and reactive to light. Cardiovascular:      Rate and Rhythm: Normal rate and regular rhythm. Heart sounds: Normal heart sounds. Pulmonary:      Effort: Pulmonary effort is normal.      Breath sounds: Normal breath sounds. Abdominal:      General: Bowel sounds are normal. There is no distension. Palpations: Abdomen is soft. There is no mass. Tenderness: There is no abdominal tenderness. There is no guarding.    Skin: General: Skin is warm and dry. Coloration: Skin is not jaundiced. Neurological:      Mental Status: He is alert and oriented to person, place, and time. Mental status is at baseline. LABORATORY DATA: Reviewed  Lab Results   Component Value Date    WBC 6.8 06/23/2020    HGB 14.8 06/23/2020    HCT 43.9 06/23/2020    MCV 90.9 06/23/2020     06/23/2020     06/23/2020    K 3.7 06/23/2020    CL 99 06/23/2020    CO2 28 06/23/2020    BUN 16 06/23/2020    CREATININE 0.82 06/23/2020    LABPROT 7.4 10/22/2012    LABALBU 4.1 06/23/2020    BILITOT 0.45 06/23/2020    ALKPHOS 91 06/23/2020    AST 24 06/23/2020    ALT 30 06/23/2020    INR 0.9 02/05/2020         Lab Results   Component Value Date    RBC 4.83 06/23/2020    HGB 14.8 06/23/2020    MCV 90.9 06/23/2020    MCH 30.7 06/23/2020    MCHC 33.7 06/23/2020    RDW 14.2 06/23/2020    MPV 8.7 06/23/2020    BASOPCT 0 06/23/2020    LYMPHSABS 1.40 06/23/2020    MONOSABS 0.60 06/23/2020    NEUTROABS 4.70 06/23/2020    EOSABS 0.10 06/23/2020    BASOSABS 0.00 06/23/2020         DIAGNOSTIC TESTING:     No results found. IMPRESSION: Mr. Reji Erickson is a 72 y.o. male with    Diagnosis Orders   1. History of colon polyps     2. Prominent ileocecal valve     3. Diverticulosis       Colonoscopy reviewed with patient in detail. Small tubular adenoma in the transverse colon. Biopsies from ileocecal valve negative. Patient has diverticulosis. Advised to follow high fiber diet and to take precautions to avoid constipation and straining. Colonoscopy in 3 years. Follow-up as needed      Thank you for allowing me to participate in the care of Mr. Reji Erickson. For any further questions please do not hesitate to contact me. I have reviewed and agree with the ROS entered by the MA/LPN.          SHAKA Leon    [facility]  Office #: [phone]

## 2020-12-15 ENCOUNTER — HOSPITAL ENCOUNTER (OUTPATIENT)
Age: 70
Discharge: HOME | End: 2020-12-15
Payer: MEDICARE

## 2020-12-15 ENCOUNTER — OFFICE VISIT (OUTPATIENT)
Dept: ONCOLOGY | Age: 70
End: 2020-12-15
Payer: MEDICARE

## 2020-12-15 ENCOUNTER — TELEPHONE (OUTPATIENT)
Dept: ONCOLOGY | Age: 70
End: 2020-12-15

## 2020-12-15 VITALS
SYSTOLIC BLOOD PRESSURE: 123 MMHG | WEIGHT: 186.4 LBS | BODY MASS INDEX: 31.02 KG/M2 | TEMPERATURE: 97.7 F | DIASTOLIC BLOOD PRESSURE: 78 MMHG | HEART RATE: 71 BPM

## 2020-12-15 DIAGNOSIS — C91.10 CHRONIC LARGE GRANULAR LYMPHOCYTIC LEUKEMIA (HCC): ICD-10-CM

## 2020-12-15 LAB
ABSOLUTE EOS #: 0.1 K/UL (ref 0–0.4)
ABSOLUTE IMMATURE GRANULOCYTE: ABNORMAL K/UL (ref 0–0.3)
ABSOLUTE LYMPH #: 1.7 K/UL (ref 1–4.8)
ABSOLUTE MONO #: 0.6 K/UL (ref 0.1–1.2)
ALBUMIN SERPL-MCNC: 4 G/DL (ref 3.5–5.2)
ALBUMIN/GLOBULIN RATIO: 1.6 (ref 1–2.5)
ALP BLD-CCNC: 89 U/L (ref 40–129)
ALT SERPL-CCNC: 32 U/L (ref 5–41)
ANION GAP SERPL CALCULATED.3IONS-SCNC: 7 MMOL/L (ref 9–17)
AST SERPL-CCNC: 23 U/L
BASOPHILS # BLD: 1 % (ref 0–2)
BASOPHILS ABSOLUTE: 0.1 K/UL (ref 0–0.2)
BILIRUB SERPL-MCNC: 0.25 MG/DL (ref 0.3–1.2)
BUN BLDV-MCNC: 16 MG/DL (ref 8–23)
BUN/CREAT BLD: ABNORMAL (ref 9–20)
CALCIUM SERPL-MCNC: 9.5 MG/DL (ref 8.6–10.4)
CHLORIDE BLD-SCNC: 101 MMOL/L (ref 98–107)
CO2: 31 MMOL/L (ref 20–31)
CREAT SERPL-MCNC: 1 MG/DL (ref 0.7–1.2)
DIFFERENTIAL TYPE: ABNORMAL
EOSINOPHILS RELATIVE PERCENT: 1 % (ref 1–4)
GFR AFRICAN AMERICAN: >60 ML/MIN
GFR NON-AFRICAN AMERICAN: >60 ML/MIN
GFR SERPL CREATININE-BSD FRML MDRD: ABNORMAL ML/MIN/{1.73_M2}
GFR SERPL CREATININE-BSD FRML MDRD: ABNORMAL ML/MIN/{1.73_M2}
GLUCOSE BLD-MCNC: 124 MG/DL (ref 70–99)
HCT VFR BLD CALC: 41.6 % (ref 41–53)
HEMOGLOBIN: 14 G/DL (ref 13.5–17.5)
IMMATURE GRANULOCYTES: ABNORMAL %
LYMPHOCYTES # BLD: 23 % (ref 24–44)
MCH RBC QN AUTO: 30.6 PG (ref 26–34)
MCHC RBC AUTO-ENTMCNC: 33.6 G/DL (ref 31–37)
MCV RBC AUTO: 91.1 FL (ref 80–100)
MONOCYTES # BLD: 8 % (ref 2–11)
NRBC AUTOMATED: ABNORMAL PER 100 WBC
PDW BLD-RTO: 14.2 % (ref 12.5–15.4)
PLATELET # BLD: 201 K/UL (ref 140–450)
PLATELET ESTIMATE: ABNORMAL
PMV BLD AUTO: 8.7 FL (ref 6–12)
POTASSIUM SERPL-SCNC: 3.6 MMOL/L (ref 3.7–5.3)
RBC # BLD: 4.56 M/UL (ref 4.5–5.9)
RBC # BLD: ABNORMAL 10*6/UL
SEG NEUTROPHILS: 67 % (ref 36–66)
SEGMENTED NEUTROPHILS ABSOLUTE COUNT: 5 K/UL (ref 1.8–7.7)
SODIUM BLD-SCNC: 139 MMOL/L (ref 135–144)
TOTAL PROTEIN: 6.5 G/DL (ref 6.4–8.3)
WBC # BLD: 7.4 K/UL (ref 3.5–11)
WBC # BLD: ABNORMAL 10*3/UL

## 2020-12-15 PROCEDURE — 80053 COMPREHEN METABOLIC PANEL: CPT

## 2020-12-15 PROCEDURE — 99211 OFF/OP EST MAY X REQ PHY/QHP: CPT | Performed by: INTERNAL MEDICINE

## 2020-12-15 PROCEDURE — 1036F TOBACCO NON-USER: CPT | Performed by: INTERNAL MEDICINE

## 2020-12-15 PROCEDURE — 1123F ACP DISCUSS/DSCN MKR DOCD: CPT | Performed by: INTERNAL MEDICINE

## 2020-12-15 PROCEDURE — 85025 COMPLETE CBC W/AUTO DIFF WBC: CPT

## 2020-12-15 PROCEDURE — G8427 DOCREV CUR MEDS BY ELIG CLIN: HCPCS | Performed by: INTERNAL MEDICINE

## 2020-12-15 PROCEDURE — 3017F COLORECTAL CA SCREEN DOC REV: CPT | Performed by: INTERNAL MEDICINE

## 2020-12-15 PROCEDURE — 4040F PNEUMOC VAC/ADMIN/RCVD: CPT | Performed by: INTERNAL MEDICINE

## 2020-12-15 PROCEDURE — 99213 OFFICE O/P EST LOW 20 MIN: CPT | Performed by: INTERNAL MEDICINE

## 2020-12-15 PROCEDURE — G8484 FLU IMMUNIZE NO ADMIN: HCPCS | Performed by: INTERNAL MEDICINE

## 2020-12-15 PROCEDURE — 36415 COLL VENOUS BLD VENIPUNCTURE: CPT

## 2020-12-15 PROCEDURE — G8417 CALC BMI ABV UP PARAM F/U: HCPCS | Performed by: INTERNAL MEDICINE

## 2020-12-15 NOTE — PROGRESS NOTES
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 He reports that he was seen by his PCP in October 2013, for routine follow up visit. His Lab work at that time revealed low WBC 3.0K. So his lab work was repeated by his PCP in  Jan 2014. Which again revealed WBC 2.6 and ANC of 0.5, so he was referred to our hematology clinic for further evaluation and recommendations. His WBC have been stable for last few months, however his iron levels were mildly low so I started him on PO iron. INTERVAL HISTORY:  Nishant Witt is returning for follow-up visit and to discuss lab results and further recommendations. Currently he is on oral chemotherapy with methotrexate and tolerating really well. His recent blood counts are stable. He denies any nausea vomiting. He denies any difficulty swallowing he denies any unintentional weight loss drenching night sweats or fever chills. During this visit patient's allergy, social, medical, surgical history and medications were reviewed and updated.       Current Outpatient Medications   Medication Sig Dispense Refill    blood glucose test strips (ONETOUCH ULTRA) strip USE STRIP TO CHECK GLUCOSE ONCE DAILY AS NEEDED 50 each 3    ONETOUCH ULTRA strip USE STRIP TO CHECK GLUCOSE ONCE DAILY AS NEEDED 50 each 0    methotrexate (RHEUMATREX) 2.5 MG chemo tablet TAKE 8 TABLETS BY MOUTH ONCE A WEEK 32 tablet 0    OneTouch Delica Lancets 67M MISC USE TO TEST BLOOD SUGAR THREE TO FOUR TIMES DAILY AND AS NEEDED 200 each 0    metFORMIN (GLUCOPHAGE) 500 MG tablet TAKE 1 TABLET TWICE DAILY  WITH MEALS 180 tablet 3    levothyroxine (SYNTHROID) 150 MCG tablet Take 1 tab daily, on Mon and Thurs, take 0.5 tabs 90 tablet 0    atorvastatin (LIPITOR) 10 MG tablet TAKE 1 TABLET BY MOUTH ONCE DAILY 90 tablet 1    enalapril (VASOTEC) 20 MG tablet TAKE 1 TABLET TWICE A  tablet 1    hydroCHLOROthiazide (HYDRODIURIL) 25 MG tablet TAKE 1 TABLET ONCE DAILY 90 tablet 1    omeprazole (PRILOSEC) 40 MG delayed release capsule TAKE 1 CAPSULE DAILY 90 capsule 1    hydrocortisone 2.5 % ointment Apply topically 2 times daily. 28.35 g 0    fluticasone (FLONASE) 50 MCG/ACT nasal spray 2 sprays by Nasal route daily (Patient taking differently: 2 sprays by Nasal route daily Using PRN) 1 Bottle 3     No current facility-administered medications for this visit. REVIEW OF SYSTEM:     Constitutional: No fever or chills. No night sweats, no weight loss, some fatigue late during the day  Eyes: No eye discharge, double vision, or eye pain   HEENT: negative for sore mouth, sore throat, hoarseness and voice change   Respiratory: negative for cough , sputum, dyspnea, wheezing, hemoptysis, chest pain   Cardiovascular: negative for chest pain, dyspnea, palpitations, orthopnea, PND   Gastrointestinal: negative for nausea, vomiting, diarrhea, constipation, abdominal pain, Dysphagia, hematemesis and hematochezia, +rectal pain.   Genitourinary: negative for frequency, dysuria, nocturia, urinary incontinence, and hematuria   Integument: ++ Edematous macular skin rash over torso, upper and lower extremity  Hematologic/Lymphatic: negative for easy bruising, bleeding, lymphadenopathy, petechiae and swelling/edema   Endocrine: negative for heat or cold intolerance, tremor, weight changes, change in bowel habits and hair loss   Musculoskeletal: negative for myalgias, arthralgias, pain, joint swelling,and bone pain   Neurological: negative for headaches, dizziness, seizures, weakness, numbness     OBJECTIVE: Vitals:    12/15/20 1634   BP: 123/78   Pulse: 71   Temp: 97.7 °F (36.5 °C)      PHYSICAL EXAM:   General appearance - well appearing, no in pain or distress   Mental status - alert and cooperative   Eyes - pupils equal and reactive, extraocular eye movements intact   Ears - bilateral TM's and external ear canals normal   Mouth - mucous membranes moist, pharynx normal without lesions   Neck - supple, no significant adenopathy   Lymphatics - no palpable lymphadenopathy, no hepatosplenomegaly   Chest - clear to auscultation, no wheezes, rales or rhonchi, symmetric air entry   Heart - normal rate, regular rhythm, normal S1, S2, no murmurs, rubs, clicks or gallops   Abdomen - soft, nontender, nondistended, no masses or organomegaly   Neurological - alert, oriented, normal speech, no focal findings or movement disorder noted   Musculoskeletal - no joint tenderness, deformity or swelling   Extremities - peripheral pulses normal, no pedal edema, no clubbing or cyanosis   Skin - ++ Edematous macular skin rash over torso, upper and lower extremity    LABORATORY DATA:     Lab Results   Component Value Date    WBC 7.4 12/15/2020    HGB 14.0 12/15/2020    HCT 41.6 12/15/2020    MCV 91.1 12/15/2020     12/15/2020       Chemistry        Component Value Date/Time     06/23/2020 0920    K 3.7 06/23/2020 0920    CL 99 06/23/2020 0920    CO2 28 06/23/2020 0920    BUN 16 06/23/2020 0920    CREATININE 0.82 06/23/2020 0920        Component Value Date/Time    CALCIUM 9.8 06/23/2020 0920    ALKPHOS 91 06/23/2020 0920    AST 24 06/23/2020 0920    ALT 30 06/23/2020 0920    BILITOT 0.45 06/23/2020 0920        PATHOLOGY DATA:   Final Diagnosis  SPECIMEN \"A\": DUODENUM, BIOPSY:    DUODENAL TYPE MUCOSA DEMONSTRATING NO SIGNIFICANT HISTOPATHOLOGIC  FEATURES    THE FEATURES OF SPRUE ARE NOT IDENTIFIED   SPECIMEN \"B\": LOWER RIGHT COLON, BIOPSY:  TUBULAR ADENOMA   SPECIMEN \"C\": ILEOCECAL VALVE, BIOPSY:    BENIGN SMALL INTESTINAL TYPE MUCOSA WITH FEATURES OF  PSEUDOLIPOMATOSIS  SCANT FRAGMENT OF BENIGN FIBROADIPOSE TISSUE     Final Diagnosis   7/30/18 LEFT POSTERIOR ILIAC CREST, BONE NEEDLE BIOPSY, SEDIMENT AND REED   STAINED SMEARS OF BONE MARROW ASPIRATE SHOWING:         PATCHY MARKED HYPERCELLULARITY WITH PRESENCE OF IRON STORES     RELATIVE LYMPHOCYTOSIS (46%) WITH T-LYMPHOCYTE HYPERPLASIA     ERYTHROID HYPERPLASIA (ME RATIO IS 1.2:1)     MARKED DECREASE IN BANDS AND SEGMENTED NEUTROPHILS (5 AND 1% RESPECTIVELY)     MEGAKARYOCYTES ARE PRESENT     NO FOREIGN CELLS OR GRANULOMATA IDENTIFIED   ADDENDUM COMMENT   Additional immunostains are performed (CD4, CD8, CD5 and CD7).  These   immunostains fail to demonstrate significant aberrant staining (controls adequate).  There is a slight increased number of CD8 positive T-cells as compared to CD4 positive cells.  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 This is of uncertain clinical significance. A portion of the bone marrow was sent to [facility] for cytogenetic studies including FISH. [facility] results are as follows. (Please see their separate report [id number] for complete results). Modal # of Chromosomes:     46          No. of Cells Counted:     20   Staining Method:          GTG;FISH     No. of Cells Analyzed:     20   No. and/or Type of Cultures:     24MF;48CM     Hypomodal Cells:     2   No. of Karyograms:     6          Hypermodal Cells:     1     Karyotype:   46,XY[20]. nuc   tiffany(D5S23,EGR1)x2[200],(CEP7,A1G778)x2[200],(R6I424,CEP8)x2[200],   (TEL20p,H52Y698)x2[200]     Cytogenetic Diagnosis   A.     Normal male chromosome complement   B.      FISH: No evidence of -5/del(5q), -7/del(7q) or -20/del(20q)   C.      FISH: No evidence of +8     Interpretation:   Cytogenetic examination of twenty metaphase cells showed a normal   karyotype with no consistent numerical or structural chromosome   aberrations observed.   Fluorescence in situ hybridization (FISH) analysis was performed using   1) the LSI 5q EGR1 (5q31, SpectrumOrange)/D5S23,A4V770 (5p15.2,   SpectrumGreen) Dual Color DNA probe set (Vysis) which detects deletions of 5q31 containing the EGR1 locus and can be used to identify loss of chromosome 5; 2) the LSI [id number] (7q31, SpectrumOrange)/CEP7 (7 centromere, SpectrumGreen) Dual Color DNA probe set (Vysis) which detects deletions of 7q31 and may be used to identify loss of chromosome 7;  3) the LSI CEP 8 (SpectrumOrange)/L1R472 (8p telomere, SpectrumGreen) probe combination which allows enumeration of chromosome 8 centromere and may be used to detect gain or loss of chromosome 8; and 4) the 20p TEL (20p13, SpectrumGreen)/LSI [id number] (20q12, SpectrumOrange) probe combination which detects deletion of 20q. Examination of 200 interphase nuclei with each probe revealed normal hybridization patterns. Please note that this analysis does not eliminate the possibility of single gene defects, chromosomal mosaicism involving abnormal cell lines of low frequency, small structural chromosome abnormalities, or failure to sample any malignant clone(s) that may be present.     FLOW CYTOMETRIC ANALYSIS OF PERIPHERAL BLOOD PERFORMED AT [facility] (T-CELL CLONALITY BY FLOW CYTOMETRY OF TCR V-BETA)   REPORTS NO EVIDENCE OF A MONOCLONAL T-CELL POPULATION.      BONE MARROW, ASPIRATE, BIOPSY, AND CLOT SECTIONS DEMONSTRATING:          HYPERCELLULAR HEMATOPOIETIC MARROW (80%) WITH INCREASED   T-LYMPHOCYTES (20-40%)     FLOW CYTOMETRIC ANALYSIS OF THE BONE MARROW FOR T-CELL CLONALITY   PERFORMED AT [facility] (T-CELL CLONALITY BY FLOW CYTOMETRY TCR   V-BETA) REPORTS NO EVIDENCE OF A MONOCLONAL T-CELL POPULATION   IMAGING DATA:    Reviewed  PET scan 8/27/18  Impression   1. No evidence of metabolically active lymphadenopathy.    2. Mild generalized uptake within the osseous structures without   corresponding CT abnormality as well as splenomegaly which are likely due to   history of  immunoproliferative disease. 3. Focal uptake in the right perianal region with associated mild soft tissue   prominence, please correlate with direct physical examination. 4. Chronic findings as described including cholelithiasis. ASSESSMENT:    Mr Juan M El is 72 y.o.  YOM with chronic neutropenia. Other work-up including at [facility] clinic suggested adult T-cell lymphoma. He was started on methotrexate and so far responded very well. Neutropenia resolved. His fatigue has improved  Patient's questions were sought and answered to his satisfaction. He agreed with the plan. PLAN:   Reviewed recent lab work with patient   Based on the history, physical examination and lab work there is no evidence of recurrence   Plan to continue methotrexate  We will repeat lab work in 6 months  Dental clinic in 6 months or earlier if needed    I spent more than 20 minutes examining, evaluating, reviewing data and counseling the patient. Greater than 50% of that time was spent face-to-face with the patient in counseling and coordinating her care.     Rickie Narayanan MD  Hematology/Oncology    CC: MELISSA Mayer - CNP

## 2021-02-07 DIAGNOSIS — C91.10 CHRONIC LARGE GRANULAR LYMPHOCYTIC LEUKEMIA (HCC): ICD-10-CM

## 2021-02-10 RX ORDER — METHOTREXATE 2.5 MG/1
TABLET ORAL
Qty: 32 TABLET | Refills: 2 | Status: SHIPPED | OUTPATIENT
Start: 2021-02-10 | End: 2021-05-10

## 2021-05-04 ENCOUNTER — HOSPITAL ENCOUNTER (OUTPATIENT)
Dept: GENERAL RADIOLOGY | Facility: CLINIC | Age: 71
Discharge: HOME | End: 2021-05-06
Payer: MEDICARE

## 2021-05-04 DIAGNOSIS — T46.2X4A AMIODARONE TOXICITY, UNDETERMINED INTENT, INITIAL ENCOUNTER: ICD-10-CM

## 2021-05-04 DIAGNOSIS — I50.9 CONGESTIVE HEART FAILURE, UNSPECIFIED HF CHRONICITY, UNSPECIFIED HEART FAILURE TYPE (HCC): ICD-10-CM

## 2021-05-09 DIAGNOSIS — C91.10 CHRONIC LARGE GRANULAR LYMPHOCYTIC LEUKEMIA (HCC): ICD-10-CM

## 2021-05-10 RX ORDER — METHOTREXATE 2.5 MG/1
TABLET ORAL
Qty: 32 TABLET | Refills: 0 | Status: SHIPPED | OUTPATIENT
Start: 2021-05-10 | End: 2021-06-09

## 2021-06-03 ENCOUNTER — HOSPITAL ENCOUNTER (OUTPATIENT)
Age: 71
Setting detail: SPECIMEN
Discharge: HOME | End: 2021-06-03
Payer: MEDICARE

## 2021-06-03 DIAGNOSIS — E11.9 TYPE 2 DIABETES MELLITUS WITHOUT COMPLICATION, WITHOUT LONG-TERM CURRENT USE OF INSULIN (HCC): ICD-10-CM

## 2021-06-03 LAB
ALBUMIN SERPL-MCNC: 3.8 G/DL (ref 3.5–5.2)
ALBUMIN/GLOBULIN RATIO: 1.4 (ref 1–2.5)
ALP BLD-CCNC: 94 U/L (ref 40–129)
ALT SERPL-CCNC: 30 U/L (ref 5–41)
ANION GAP SERPL CALCULATED.3IONS-SCNC: 11 MMOL/L (ref 9–17)
AST SERPL-CCNC: 21 U/L
BILIRUB SERPL-MCNC: 0.36 MG/DL (ref 0.3–1.2)
BUN BLDV-MCNC: 12 MG/DL (ref 8–23)
BUN/CREAT BLD: ABNORMAL (ref 9–20)
CALCIUM SERPL-MCNC: 9.2 MG/DL (ref 8.6–10.4)
CHLORIDE BLD-SCNC: 102 MMOL/L (ref 98–107)
CHOLESTEROL/HDL RATIO: 2
CHOLESTEROL: 135 MG/DL
CO2: 26 MMOL/L (ref 20–31)
CREAT SERPL-MCNC: 0.76 MG/DL (ref 0.7–1.2)
ESTIMATED AVERAGE GLUCOSE: 111 MG/DL
GFR AFRICAN AMERICAN: >60 ML/MIN
GFR NON-AFRICAN AMERICAN: >60 ML/MIN
GFR SERPL CREATININE-BSD FRML MDRD: ABNORMAL ML/MIN/{1.73_M2}
GFR SERPL CREATININE-BSD FRML MDRD: ABNORMAL ML/MIN/{1.73_M2}
GLUCOSE BLD-MCNC: 108 MG/DL (ref 70–99)
HBA1C MFR BLD: 5.5 % (ref 4–6)
HCT VFR BLD CALC: 43.1 % (ref 40.7–50.3)
HDLC SERPL-MCNC: 68 MG/DL
HEMOGLOBIN: 14.2 G/DL (ref 13–17)
LDL CHOLESTEROL: 56 MG/DL (ref 0–130)
MCH RBC QN AUTO: 30.5 PG (ref 25.2–33.5)
MCHC RBC AUTO-ENTMCNC: 32.9 G/DL (ref 28.4–34.8)
MCV RBC AUTO: 92.7 FL (ref 82.6–102.9)
NRBC AUTOMATED: 0 PER 100 WBC
PDW BLD-RTO: 12.9 % (ref 11.8–14.4)
PLATELET # BLD: 191 K/UL (ref 138–453)
PMV BLD AUTO: 11.2 FL (ref 8.1–13.5)
POTASSIUM SERPL-SCNC: 3.9 MMOL/L (ref 3.7–5.3)
RBC # BLD: 4.65 M/UL (ref 4.21–5.77)
SODIUM BLD-SCNC: 139 MMOL/L (ref 135–144)
THYROXINE, FREE: 2.01 NG/DL (ref 0.93–1.7)
TOTAL PROTEIN: 6.6 G/DL (ref 6.4–8.3)
TRIGL SERPL-MCNC: 53 MG/DL
TSH SERPL DL<=0.05 MIU/L-ACNC: 0.04 MIU/L (ref 0.3–5)
VLDLC SERPL CALC-MCNC: NORMAL MG/DL (ref 1–30)
WBC # BLD: 7.7 K/UL (ref 3.5–11.3)

## 2021-06-09 DIAGNOSIS — C91.10 CHRONIC LARGE GRANULAR LYMPHOCYTIC LEUKEMIA (HCC): ICD-10-CM

## 2021-06-09 RX ORDER — METHOTREXATE 2.5 MG/1
TABLET ORAL
Qty: 32 TABLET | Refills: 0 | Status: SHIPPED | OUTPATIENT
Start: 2021-06-09 | End: 2021-07-07

## 2021-06-15 ENCOUNTER — TELEPHONE (OUTPATIENT)
Dept: ONCOLOGY | Age: 71
End: 2021-06-15

## 2021-06-15 ENCOUNTER — OFFICE VISIT (OUTPATIENT)
Dept: ONCOLOGY | Age: 71
End: 2021-06-15
Payer: MEDICARE

## 2021-06-15 ENCOUNTER — HOSPITAL ENCOUNTER (OUTPATIENT)
Age: 71
End: 2021-06-15
Payer: MEDICARE

## 2021-06-15 VITALS
RESPIRATION RATE: 18 BRPM | DIASTOLIC BLOOD PRESSURE: 84 MMHG | SYSTOLIC BLOOD PRESSURE: 135 MMHG | HEART RATE: 81 BPM | BODY MASS INDEX: 29.47 KG/M2 | TEMPERATURE: 98.9 F | WEIGHT: 177.1 LBS

## 2021-06-15 DIAGNOSIS — C91.10 CHRONIC LARGE GRANULAR LYMPHOCYTIC LEUKEMIA (HCC): Primary | ICD-10-CM

## 2021-06-15 PROCEDURE — 99211 OFF/OP EST MAY X REQ PHY/QHP: CPT

## 2021-06-15 PROCEDURE — 99214 OFFICE O/P EST MOD 30 MIN: CPT | Performed by: INTERNAL MEDICINE

## 2021-06-15 PROCEDURE — G8427 DOCREV CUR MEDS BY ELIG CLIN: HCPCS | Performed by: INTERNAL MEDICINE

## 2021-06-15 PROCEDURE — 1036F TOBACCO NON-USER: CPT | Performed by: INTERNAL MEDICINE

## 2021-06-15 PROCEDURE — 3017F COLORECTAL CA SCREEN DOC REV: CPT | Performed by: INTERNAL MEDICINE

## 2021-06-15 PROCEDURE — G8417 CALC BMI ABV UP PARAM F/U: HCPCS | Performed by: INTERNAL MEDICINE

## 2021-06-15 PROCEDURE — 4040F PNEUMOC VAC/ADMIN/RCVD: CPT | Performed by: INTERNAL MEDICINE

## 2021-06-15 PROCEDURE — 1123F ACP DISCUSS/DSCN MKR DOCD: CPT | Performed by: INTERNAL MEDICINE

## 2021-07-06 DIAGNOSIS — C91.10 CHRONIC LARGE GRANULAR LYMPHOCYTIC LEUKEMIA (HCC): ICD-10-CM

## 2021-07-07 NOTE — TELEPHONE ENCOUNTER
Received methotrexate refill request from [facility] DR WILLY SHOEMAKER. Dr. Soco Newman note from 6/15/2 states to continue methotrexate. Electronic script entered.

## 2021-12-07 ENCOUNTER — HOSPITAL ENCOUNTER (OUTPATIENT)
Age: 71
Setting detail: SPECIMEN
Discharge: HOME | End: 2021-12-07

## 2021-12-07 DIAGNOSIS — C91.10 CHRONIC LARGE GRANULAR LYMPHOCYTIC LEUKEMIA (HCC): ICD-10-CM

## 2021-12-07 DIAGNOSIS — C88.9: ICD-10-CM

## 2021-12-07 LAB
ABSOLUTE EOS #: 0.04 K/UL (ref 0–0.44)
ABSOLUTE IMMATURE GRANULOCYTE: <0.03 K/UL (ref 0–0.3)
ABSOLUTE LYMPH #: 1.45 K/UL (ref 1.1–3.7)
ABSOLUTE MONO #: 0.74 K/UL (ref 0.1–1.2)
ALBUMIN SERPL-MCNC: 4.1 G/DL (ref 3.5–5.2)
ALBUMIN/GLOBULIN RATIO: 1.5 (ref 1–2.5)
ALP BLD-CCNC: 94 U/L (ref 40–129)
ALT SERPL-CCNC: 24 U/L (ref 5–41)
ANION GAP SERPL CALCULATED.3IONS-SCNC: 15 MMOL/L (ref 9–17)
AST SERPL-CCNC: 19 U/L
BASOPHILS # BLD: 1 % (ref 0–2)
BASOPHILS ABSOLUTE: 0.04 K/UL (ref 0–0.2)
BILIRUB SERPL-MCNC: 0.46 MG/DL (ref 0.3–1.2)
BUN BLDV-MCNC: 15 MG/DL (ref 8–23)
BUN/CREAT BLD: ABNORMAL (ref 9–20)
CALCIUM SERPL-MCNC: 9.5 MG/DL (ref 8.6–10.4)
CHLORIDE BLD-SCNC: 101 MMOL/L (ref 98–107)
CO2: 23 MMOL/L (ref 20–31)
CREAT SERPL-MCNC: 1.04 MG/DL (ref 0.7–1.2)
DIFFERENTIAL TYPE: ABNORMAL
EOSINOPHILS RELATIVE PERCENT: 1 % (ref 1–4)
GFR AFRICAN AMERICAN: >60 ML/MIN
GFR NON-AFRICAN AMERICAN: >60 ML/MIN
GFR SERPL CREATININE-BSD FRML MDRD: ABNORMAL ML/MIN/{1.73_M2}
GFR SERPL CREATININE-BSD FRML MDRD: ABNORMAL ML/MIN/{1.73_M2}
GLUCOSE BLD-MCNC: 110 MG/DL (ref 70–99)
HCT VFR BLD CALC: 44.9 % (ref 40.7–50.3)
HEMOGLOBIN: 15.2 G/DL (ref 13–17)
IMMATURE GRANULOCYTES: 0 %
LACTATE DEHYDROGENASE: 182 U/L (ref 135–225)
LYMPHOCYTES # BLD: 17 % (ref 24–43)
MCH RBC QN AUTO: 31.9 PG (ref 25.2–33.5)
MCHC RBC AUTO-ENTMCNC: 33.9 G/DL (ref 28.4–34.8)
MCV RBC AUTO: 94.3 FL (ref 82.6–102.9)
MONOCYTES # BLD: 9 % (ref 3–12)
NRBC AUTOMATED: 0 PER 100 WBC
PDW BLD-RTO: 13.2 % (ref 11.8–14.4)
PLATELET # BLD: 216 K/UL (ref 138–453)
PLATELET ESTIMATE: ABNORMAL
PMV BLD AUTO: 11.1 FL (ref 8.1–13.5)
POTASSIUM SERPL-SCNC: 3.3 MMOL/L (ref 3.7–5.3)
RBC # BLD: 4.76 M/UL (ref 4.21–5.77)
RBC # BLD: ABNORMAL 10*6/UL
SEG NEUTROPHILS: 72 % (ref 36–65)
SEGMENTED NEUTROPHILS ABSOLUTE COUNT: 6.19 K/UL (ref 1.5–8.1)
SODIUM BLD-SCNC: 139 MMOL/L (ref 135–144)
TOTAL PROTEIN: 6.9 G/DL (ref 6.4–8.3)
WBC # BLD: 8.5 K/UL (ref 3.5–11.3)
WBC # BLD: ABNORMAL 10*3/UL

## 2021-12-14 ENCOUNTER — TELEPHONE (OUTPATIENT)
Dept: ONCOLOGY | Age: 71
End: 2021-12-14

## 2021-12-14 ENCOUNTER — OFFICE VISIT (OUTPATIENT)
Dept: ONCOLOGY | Age: 71
End: 2021-12-14
Payer: MEDICARE

## 2021-12-14 VITALS
BODY MASS INDEX: 29.69 KG/M2 | WEIGHT: 178.4 LBS | DIASTOLIC BLOOD PRESSURE: 96 MMHG | HEART RATE: 76 BPM | TEMPERATURE: 97.5 F | SYSTOLIC BLOOD PRESSURE: 159 MMHG

## 2021-12-14 DIAGNOSIS — D75.89 BONE MARROW HYPERPLASIA: ICD-10-CM

## 2021-12-14 DIAGNOSIS — C91.10 CHRONIC LARGE GRANULAR LYMPHOCYTIC LEUKEMIA (HCC): ICD-10-CM

## 2021-12-14 PROCEDURE — 99211 OFF/OP EST MAY X REQ PHY/QHP: CPT | Performed by: INTERNAL MEDICINE

## 2021-12-14 PROCEDURE — 1123F ACP DISCUSS/DSCN MKR DOCD: CPT | Performed by: INTERNAL MEDICINE

## 2021-12-14 PROCEDURE — G8427 DOCREV CUR MEDS BY ELIG CLIN: HCPCS | Performed by: INTERNAL MEDICINE

## 2021-12-14 PROCEDURE — 1036F TOBACCO NON-USER: CPT | Performed by: INTERNAL MEDICINE

## 2021-12-14 PROCEDURE — G8417 CALC BMI ABV UP PARAM F/U: HCPCS | Performed by: INTERNAL MEDICINE

## 2021-12-14 PROCEDURE — 4040F PNEUMOC VAC/ADMIN/RCVD: CPT | Performed by: INTERNAL MEDICINE

## 2021-12-14 PROCEDURE — G8484 FLU IMMUNIZE NO ADMIN: HCPCS | Performed by: INTERNAL MEDICINE

## 2021-12-14 PROCEDURE — 99214 OFFICE O/P EST MOD 30 MIN: CPT | Performed by: INTERNAL MEDICINE

## 2021-12-14 PROCEDURE — 3017F COLORECTAL CA SCREEN DOC REV: CPT | Performed by: INTERNAL MEDICINE

## 2021-12-14 NOTE — PROGRESS NOTES
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 He reports that he was seen by his PCP in October 2013, for routine follow up visit. His Lab work at that time revealed low WBC 3.0K. So his lab work was repeated by his PCP in  Jan 2014. Which again revealed WBC 2.6 and ANC of 0.5, so he was referred to our hematology clinic for further evaluation and recommendations. His WBC have been stable for last few months, however his iron levels were mildly low so I started him on PO iron. INTERVAL HISTORY:  Waylon Bassett is returning for follow-up and to discuss lab results and further recommendations. He is on weekly methotrexate and tolerating well. His recent counts are within normal limits. He denies any unintentional weight loss, drenching night sweats fever chills. He denies any new lymphadenopathy. Report that while on methotrexate his arthritic symptoms have significantly better. During this visit patient's allergy, social, medical, surgical history and medications were reviewed and updated.       Current Outpatient Medications   Medication Sig Dispense Refill    methotrexate (RHEUMATREX) 2.5 MG chemo tablet TAKE 8 TABLETS BY MOUTH ONCE A WEEK 32 tablet 5    omeprazole (PRILOSEC) 40 MG delayed release capsule TAKE 1 CAPSULE DAILY 90 capsule 1    blood glucose test strips (ONETOUCH ULTRA) strip USE 1 STRIP TO CHECK GLUCOSE ONCE DAILY AS  NEEDED 50 each 2    hydroCHLOROthiazide (HYDRODIURIL) 25 MG tablet TAKE 1 TABLET ONCE DAILY 90 tablet 1    enalapril (VASOTEC) 20 MG tablet TAKE 1 TABLET TWICE A  tablet 1    metFORMIN (GLUCOPHAGE) 500 MG tablet TAKE 1 TABLET TWICE DAILY  WITH MEALS 180 tablet 3    levothyroxine (SYNTHROID) 125 MCG tablet Take 1 tablet by mouth Daily 90 tablet 1    OneTouch Delica Lancets 36Q MISC USE TO TEST BLOOD SUGAR THREE TO FOUR TIMES DAILY AND AS NEEDED 200 each 0    atorvastatin (LIPITOR) 10 MG tablet TAKE 1 TABLET DAILY 90 tablet 3    sildenafil (VIAGRA) 50 MG tablet Take 1-2 tablets by mouth daily as needed for Erectile Dysfunction 30 tablet 0    hydrocortisone 2.5 % ointment Apply topically 2 times daily. 28.35 g 0     No current facility-administered medications for this visit. REVIEW OF SYSTEM:     Constitutional: No fever or chills. No night sweats, no weight loss, some fatigue late during the day  Eyes: No eye discharge, double vision, or eye pain   HEENT: negative for sore mouth, sore throat, hoarseness and voice change   Respiratory: negative for cough , sputum, dyspnea, wheezing, hemoptysis, chest pain   Cardiovascular: negative for chest pain, dyspnea, palpitations, orthopnea, PND   Gastrointestinal: negative for nausea, vomiting, diarrhea, constipation, abdominal pain, Dysphagia, hematemesis and hematochezia, +rectal pain.   Genitourinary: negative for frequency, dysuria, nocturia, urinary incontinence, and hematuria   Integument: ++ Edematous macular skin rash over torso, upper and lower extremity  Hematologic/Lymphatic: negative for easy bruising, bleeding, lymphadenopathy, petechiae and swelling/edema   Endocrine: negative for heat or cold intolerance, tremor, weight changes, change in bowel habits and hair loss   Musculoskeletal: negative for myalgias, arthralgias, pain, joint swelling,and bone pain   Neurological: negative for headaches, dizziness, seizures, weakness, numbness     OBJECTIVE:         Vitals:    12/14/21 1150   BP: (!) 159/96   Pulse: 76   Temp:       PHYSICAL EXAM:   General appearance - well appearing, no in pain or distress   Mental status - alert and cooperative   Eyes - pupils equal and reactive, extraocular eye movements intact   Ears - bilateral TM's and external ear canals normal   Mouth - mucous membranes moist, pharynx normal without lesions   Neck - supple, no significant adenopathy   Lymphatics - no palpable lymphadenopathy, no hepatosplenomegaly   Chest - clear to auscultation, no wheezes, rales or rhonchi, symmetric air entry   Heart - normal rate, regular rhythm, normal S1, S2, no murmurs, rubs, clicks or gallops   Abdomen - soft, nontender, nondistended, no masses or organomegaly   Neurological - alert, oriented, normal speech, no focal findings or movement disorder noted   Musculoskeletal - no joint tenderness, deformity or swelling   Extremities - peripheral pulses normal, no pedal edema, no clubbing or cyanosis   Skin - ++ Edematous macular skin rash over torso, upper and lower extremity    LABORATORY DATA:     Lab Results   Component Value Date    WBC 8.5 12/07/2021    HGB 15.2 12/07/2021    HCT 44.9 12/07/2021    MCV 94.3 12/07/2021     12/07/2021       Chemistry        Component Value Date/Time     12/07/2021 1116    K 3.3 (L) 12/07/2021 1116     12/07/2021 1116    CO2 23 12/07/2021 1116    BUN 15 12/07/2021 1116    CREATININE 1.04 12/07/2021 1116        Component Value Date/Time    CALCIUM 9.5 12/07/2021 1116    ALKPHOS 94 12/07/2021 1116    AST 19 12/07/2021 1116    ALT 24 12/07/2021 1116    BILITOT 0.46 12/07/2021 1116        PATHOLOGY DATA:   Final Diagnosis  SPECIMEN \"A\": DUODENUM, BIOPSY:    DUODENAL TYPE MUCOSA DEMONSTRATING NO SIGNIFICANT HISTOPATHOLOGIC  FEATURES    THE FEATURES OF SPRUE ARE NOT IDENTIFIED   SPECIMEN \"B\": LOWER RIGHT COLON, BIOPSY:  TUBULAR ADENOMA   SPECIMEN \"C\": ILEOCECAL VALVE, BIOPSY:    BENIGN SMALL INTESTINAL TYPE MUCOSA WITH FEATURES OF  PSEUDOLIPOMATOSIS  SCANT FRAGMENT OF BENIGN FIBROADIPOSE TISSUE     Final Diagnosis   7/30/18 LEFT POSTERIOR ILIAC CREST, BONE NEEDLE BIOPSY, SEDIMENT AND REED   STAINED SMEARS OF BONE MARROW ASPIRATE SHOWING:         PATCHY MARKED HYPERCELLULARITY WITH PRESENCE OF IRON STORES     RELATIVE LYMPHOCYTOSIS (46%) WITH T-LYMPHOCYTE HYPERPLASIA     ERYTHROID HYPERPLASIA (ME RATIO IS 1.2:1)     MARKED DECREASE IN BANDS AND SEGMENTED NEUTROPHILS (5 AND 1% RESPECTIVELY)     MEGAKARYOCYTES ARE PRESENT     NO FOREIGN CELLS OR GRANULOMATA IDENTIFIED   ADDENDUM COMMENT   Additional immunostains are performed (CD4, CD8, CD5 and CD7).  These   immunostains fail to demonstrate significant aberrant staining (controls adequate).  There is a slight increased number of CD8 positive T-cells as compared to CD4 positive cells.  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 This is of uncertain clinical significance. A portion of the bone marrow was sent to [facility] for cytogenetic studies including FISH. [facility] results are as follows. (Please see their separate report [id number] for complete results). Modal # of Chromosomes:     46          No. of Cells Counted:     20   Staining Method:          GTG;FISH     No. of Cells Analyzed:     20   No. and/or Type of Cultures:     24MF;48CM     Hypomodal Cells:     2   No. of Karyograms:     6          Hypermodal Cells:     1     Karyotype:   46,XY[20]. nuc   tiffany(D5S23,EGR1)x2[200],(CEP7,H7O165)x2[200],(L6A325,CEP8)x2[200],   (TEL20p,R19C695)x2[200]     Cytogenetic Diagnosis   A.     Normal male chromosome complement   B.      FISH: No evidence of -5/del(5q), -7/del(7q) or -20/del(20q)   C.      FISH: No evidence of +8     Interpretation:   Cytogenetic examination of twenty metaphase cells showed a normal   karyotype with no consistent numerical or structural chromosome   aberrations observed.       Fluorescence in situ hybridization (FISH) analysis was performed using   1) the LSI 5q EGR1 (5q31, SpectrumOrange)/D5S23,M7B717 (5p15.2, SpectrumGreen) Dual Color DNA probe set (Vysis) which detects deletions of 5q31 containing the EGR1 locus and can be used to identify loss of chromosome 5; 2) the LSI [id number] (7q31, SpectrumOrange)/CEP7 (7 centromere, SpectrumGreen) Dual Color DNA probe set (Vysis) which detects deletions of 7q31 and may be used to identify loss of chromosome 7;  3) the LSI CEP 8 (SpectrumOrange)/[id number] (8p telomere, SpectrumGreen) probe combination which allows enumeration of chromosome 8 centromere and may be used to detect gain or loss of chromosome 8; and 4) the 20p TEL (20p13, SpectrumGreen)/LSI [id number] (20q12, SpectrumOrange) probe combination which detects deletion of 20q. Examination of 200 interphase nuclei with each probe revealed normal hybridization patterns. Please note that this analysis does not eliminate the possibility of single gene defects, chromosomal mosaicism involving abnormal cell lines of low frequency, small structural chromosome abnormalities, or failure to sample any malignant clone(s) that may be present.     FLOW CYTOMETRIC ANALYSIS OF PERIPHERAL BLOOD PERFORMED AT [facility] (T-CELL CLONALITY BY FLOW CYTOMETRY OF TCR V-BETA)   REPORTS NO EVIDENCE OF A MONOCLONAL T-CELL POPULATION.      BONE MARROW, ASPIRATE, BIOPSY, AND CLOT SECTIONS DEMONSTRATING:          HYPERCELLULAR HEMATOPOIETIC MARROW (80%) WITH INCREASED   T-LYMPHOCYTES (20-40%)     FLOW CYTOMETRIC ANALYSIS OF THE BONE MARROW FOR T-CELL CLONALITY   PERFORMED AT [facility] (T-CELL CLONALITY BY FLOW CYTOMETRY TCR   V-BETA) REPORTS NO EVIDENCE OF A MONOCLONAL T-CELL POPULATION   IMAGING DATA:    Reviewed  PET scan 8/27/18  Impression   1. No evidence of metabolically active lymphadenopathy. 2. Mild generalized uptake within the osseous structures without   corresponding CT abnormality as well as splenomegaly which are likely due to   history of  immunoproliferative disease.    3. Focal uptake in the right perianal region with associated mild soft tissue   prominence, please correlate with direct physical examination. 4. Chronic findings as described including cholelithiasis. ASSESSMENT:    Mr Nery Santo is 80 y.o. YOM with chronic neutropenia. Other work-up including at [facility] clinic suggested adult T-cell lymphoma. He was started on methotrexate and so far responded very well. Neutropenia resolved. His fatigue has improved  Patient's questions were sought and answered to his satisfaction. He agreed with the plan. PLAN:   I reviewed recent lab work, discussed the treatment recommendation with patient   Plan to continue weekly methotrexate at this point   His labs are within normal limits   Return to clinic in 6 months with labs prior     I spent more than 40 minutes examining, evaluating, reviewing data and counseling the patient. Greater than 50% of that time was spent face-to-face with the patient in counseling and coordinating her care.     Laina Narayanan MD  Hematology/Oncology    CC: Nata Salgado PA-C

## 2021-12-14 NOTE — TELEPHONE ENCOUNTER
AVS from 12/14/21    RTC in 6 months with labs    RV scheduled 6/14/22 @ 10:45am    PT was given AVS and an appt schedule    Electronically signed by Angelia Leach on 12/14/2021 at 4:27 PM

## 2021-12-15 ENCOUNTER — TELEPHONE (OUTPATIENT)
Dept: INFUSION THERAPY | Age: 71
End: 2021-12-15

## 2021-12-15 DIAGNOSIS — C91.10 CHRONIC LARGE GRANULAR LYMPHOCYTIC LEUKEMIA (HCC): ICD-10-CM

## 2022-05-17 ENCOUNTER — HOSPITAL ENCOUNTER (OUTPATIENT)
Age: 72
Setting detail: SPECIMEN
Discharge: HOME | End: 2022-05-17

## 2022-05-17 DIAGNOSIS — I10 ESSENTIAL HYPERTENSION: ICD-10-CM

## 2022-05-17 DIAGNOSIS — E03.9 HYPOTHYROIDISM, UNSPECIFIED TYPE: ICD-10-CM

## 2022-05-17 DIAGNOSIS — E11.9 TYPE 2 DIABETES MELLITUS WITHOUT COMPLICATION, WITHOUT LONG-TERM CURRENT USE OF INSULIN (HCC): ICD-10-CM

## 2022-05-17 DIAGNOSIS — E55.9 VITAMIN D INSUFFICIENCY: ICD-10-CM

## 2022-05-17 DIAGNOSIS — Z12.5 PROSTATE CANCER SCREENING: ICD-10-CM

## 2022-05-17 DIAGNOSIS — E78.5 HYPERLIPIDEMIA, UNSPECIFIED HYPERLIPIDEMIA TYPE: ICD-10-CM

## 2022-05-17 LAB
CHOLESTEROL, FASTING: 149 MG/DL
CHOLESTEROL/HDL RATIO: 2.2
CREATININE URINE: 141.4 MG/DL (ref 39–259)
HDLC SERPL-MCNC: 68 MG/DL
LDL CHOLESTEROL: 69 MG/DL (ref 0–130)
MICROALBUMIN/CREAT 24H UR: <12 MG/L
MICROALBUMIN/CREAT UR-RTO: NORMAL MCG/MG CREAT
PROSTATE SPECIFIC ANTIGEN: 1.17 NG/ML
TRIGLYCERIDE, FASTING: 60 MG/DL
TSH SERPL DL<=0.05 MIU/L-ACNC: 0.88 UIU/ML (ref 0.3–5)
VITAMIN D 25-HYDROXY: 25.2 NG/ML

## 2022-05-18 LAB
ESTIMATED AVERAGE GLUCOSE: 117 MG/DL
HBA1C MFR BLD: 5.7 % (ref 4–6)

## 2022-06-14 ENCOUNTER — OFFICE VISIT (OUTPATIENT)
Dept: ONCOLOGY | Age: 72
End: 2022-06-14
Payer: MEDICARE

## 2022-06-14 ENCOUNTER — HOSPITAL ENCOUNTER (OUTPATIENT)
Age: 72
Discharge: HOME | End: 2022-06-14
Payer: MEDICARE

## 2022-06-14 ENCOUNTER — TELEPHONE (OUTPATIENT)
Dept: ONCOLOGY | Age: 72
End: 2022-06-14

## 2022-06-14 VITALS
WEIGHT: 177.5 LBS | HEART RATE: 65 BPM | TEMPERATURE: 97 F | BODY MASS INDEX: 29.54 KG/M2 | SYSTOLIC BLOOD PRESSURE: 136 MMHG | DIASTOLIC BLOOD PRESSURE: 84 MMHG

## 2022-06-14 DIAGNOSIS — C91.10 CHRONIC LARGE GRANULAR LYMPHOCYTIC LEUKEMIA (HCC): Primary | ICD-10-CM

## 2022-06-14 DIAGNOSIS — C91.10 CHRONIC LARGE GRANULAR LYMPHOCYTIC LEUKEMIA (HCC): ICD-10-CM

## 2022-06-14 LAB
ABSOLUTE EOS #: 0 K/UL (ref 0–0.4)
ABSOLUTE LYMPH #: 1.1 K/UL (ref 1–4.8)
ABSOLUTE MONO #: 0.5 K/UL (ref 0.1–1.2)
BASOPHILS # BLD: 1 % (ref 0–2)
BASOPHILS ABSOLUTE: 0.1 K/UL (ref 0–0.2)
EOSINOPHILS RELATIVE PERCENT: 1 % (ref 1–4)
HCT VFR BLD CALC: 41.7 % (ref 41–53)
HEMOGLOBIN: 14.1 G/DL (ref 13.5–17.5)
LYMPHOCYTES # BLD: 14 % (ref 24–44)
MCH RBC QN AUTO: 31.2 PG (ref 26–34)
MCHC RBC AUTO-ENTMCNC: 33.9 G/DL (ref 31–37)
MCV RBC AUTO: 92.1 FL (ref 80–100)
MONOCYTES # BLD: 7 % (ref 2–11)
PDW BLD-RTO: 13.4 % (ref 12.5–15.4)
PLATELET # BLD: 202 K/UL (ref 140–450)
PMV BLD AUTO: 9 FL (ref 6–12)
RBC # BLD: 4.53 M/UL (ref 4.5–5.9)
SEG NEUTROPHILS: 77 % (ref 36–66)
SEGMENTED NEUTROPHILS ABSOLUTE COUNT: 5.9 K/UL (ref 1.8–7.7)
WBC # BLD: 7.6 K/UL (ref 3.5–11)

## 2022-06-14 PROCEDURE — 85025 COMPLETE CBC W/AUTO DIFF WBC: CPT

## 2022-06-14 PROCEDURE — G8417 CALC BMI ABV UP PARAM F/U: HCPCS | Performed by: INTERNAL MEDICINE

## 2022-06-14 PROCEDURE — 36415 COLL VENOUS BLD VENIPUNCTURE: CPT

## 2022-06-14 PROCEDURE — 4004F PT TOBACCO SCREEN RCVD TLK: CPT | Performed by: INTERNAL MEDICINE

## 2022-06-14 PROCEDURE — 99214 OFFICE O/P EST MOD 30 MIN: CPT | Performed by: INTERNAL MEDICINE

## 2022-06-14 PROCEDURE — 3017F COLORECTAL CA SCREEN DOC REV: CPT | Performed by: INTERNAL MEDICINE

## 2022-06-14 PROCEDURE — G8427 DOCREV CUR MEDS BY ELIG CLIN: HCPCS | Performed by: INTERNAL MEDICINE

## 2022-06-14 PROCEDURE — 1123F ACP DISCUSS/DSCN MKR DOCD: CPT | Performed by: INTERNAL MEDICINE

## 2022-06-14 NOTE — PROGRESS NOTES
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 He reports that he was seen by his PCP in October 2013, for routine follow up visit. His Lab work at that time revealed low WBC 3.0K. So his lab work was repeated by his PCP in  Jan 2014. Which again revealed WBC 2.6 and ANC of 0.5, so he was referred to our hematology clinic for further evaluation and recommendations. His WBC have been stable for last few months, however his iron levels were mildly low so I started him on PO iron. INTERVAL HISTORY:  Pennie Lee is returning for follow-up Tainya to discuss lab results and further recommendations. He is tolerating methotrexate well. His recent lab work showing stable WBC, hemoglobin and platelets. He denies any unintentional weight loss, drenching night sweats fever chills. He denies any abdominal pain, nausea vomiting   or skin rash. During this visit patient's allergy, social, medical, surgical history and medications were reviewed and updated.       Current Outpatient Medications   Medication Sig Dispense Refill    blood glucose test strips (ONETOUCH ULTRA) strip Test sugar once daily 100 each 5    OneTouch Delica Lancets 78D MISC Test blood sugar daily 100 each 5    Handicap Placard MISC by Does not apply route Expires in 2023 1 each 0    vitamin D3 (CHOLECALCIFEROL) 25 MCG (1000 UT) TABS tablet Take 1 tablet by mouth daily 30 tablet 5    omeprazole (PRILOSEC) 40 MG delayed release capsule TAKE 1 CAPSULE DAILY 90 capsule 1    enalapril (VASOTEC) 20 MG tablet Take 1 tablet by mouth 2 times daily 180 tablet 2    atorvastatin (LIPITOR) 10 MG tablet TAKE 1 TABLET DAILY 90 tablet 1    levothyroxine (SYNTHROID) 125 MCG tablet Take 1 tablet by mouth Daily 90 tablet 1    hydroCHLOROthiazide (HYDRODIURIL) 25 MG tablet Take 1 tablet by mouth daily 90 tablet 1    methotrexate (RHEUMATREX) 2.5 MG chemo tablet TAKE 8 TABLETS BY MOUTH ONCE A WEEK 96 tablet 3    metFORMIN (GLUCOPHAGE) 500 MG tablet TAKE 1 TABLET TWICE DAILY  WITH MEALS 180 tablet 3    sildenafil (VIAGRA) 50 MG tablet Take 1-2 tablets by mouth daily as needed for Erectile Dysfunction 30 tablet 0     No current facility-administered medications for this visit. REVIEW OF SYSTEM:     Constitutional: No fever or chills. No night sweats, no weight loss, some fatigue late during the day  Eyes: No eye discharge, double vision, or eye pain   HEENT: negative for sore mouth, sore throat, hoarseness and voice change   Respiratory: negative for cough , sputum, dyspnea, wheezing, hemoptysis, chest pain   Cardiovascular: negative for chest pain, dyspnea, palpitations, orthopnea, PND   Gastrointestinal: negative for nausea, vomiting, diarrhea, constipation, abdominal pain, Dysphagia, hematemesis and hematochezia, +rectal pain.   Genitourinary: negative for frequency, dysuria, nocturia, urinary incontinence, and hematuria   Integument: ++ Edematous macular skin rash over torso, upper and lower extremity  Hematologic/Lymphatic: negative for easy bruising, bleeding, lymphadenopathy, petechiae and swelling/edema   Endocrine: negative for heat or cold intolerance, tremor, weight changes, change in bowel habits and hair loss   Musculoskeletal: negative for myalgias, arthralgias, pain, joint swelling,and bone pain   Neurological: negative for headaches, dizziness, seizures, weakness, numbness     OBJECTIVE:         Vitals:    06/14/22 1119   BP: 136/84   Pulse: 65   Temp: 97 °F (36.1 °C)      PHYSICAL EXAM:   General appearance - well appearing, no in pain or distress   Mental status - alert and cooperative   Eyes - pupils equal and reactive, extraocular eye movements intact   Ears - bilateral TM's and external ear canals normal   Mouth - mucous membranes moist, pharynx normal without lesions   Neck - supple, no significant adenopathy   Lymphatics - no palpable lymphadenopathy, no hepatosplenomegaly   Chest - clear to auscultation, no wheezes, rales or rhonchi, symmetric air entry   Heart - normal rate, regular rhythm, normal S1, S2, no murmurs, rubs, clicks or gallops   Abdomen - soft, nontender, nondistended, no masses or organomegaly   Neurological - alert, oriented, normal speech, no focal findings or movement disorder noted   Musculoskeletal - no joint tenderness, deformity or swelling   Extremities - peripheral pulses normal, no pedal edema, no clubbing or cyanosis   Skin - ++ Edematous macular skin rash over torso, upper and lower extremity    LABORATORY DATA:     Lab Results   Component Value Date    WBC 7.6 06/14/2022    HGB 14.1 06/14/2022    HCT 41.7 06/14/2022    MCV 92.1 06/14/2022     06/14/2022       Chemistry        Component Value Date/Time     12/07/2021 1116    K 3.3 (L) 12/07/2021 1116     12/07/2021 1116    CO2 23 12/07/2021 1116    BUN 15 12/07/2021 1116    CREATININE 1.04 12/07/2021 1116        Component Value Date/Time    CALCIUM 9.5 12/07/2021 1116    ALKPHOS 94 12/07/2021 1116    AST 19 12/07/2021 1116    ALT 24 12/07/2021 1116    BILITOT 0.46 12/07/2021 1116        PATHOLOGY DATA:   Final Diagnosis  SPECIMEN \"A\": DUODENUM, BIOPSY:    DUODENAL TYPE MUCOSA DEMONSTRATING NO SIGNIFICANT HISTOPATHOLOGIC  FEATURES    THE FEATURES OF SPRUE ARE NOT IDENTIFIED   SPECIMEN \"B\": LOWER RIGHT COLON, BIOPSY:  TUBULAR ADENOMA   SPECIMEN \"C\": ILEOCECAL VALVE, BIOPSY:    BENIGN SMALL INTESTINAL TYPE MUCOSA WITH FEATURES OF  PSEUDOLIPOMATOSIS  SCANT FRAGMENT OF BENIGN FIBROADIPOSE TISSUE     Final Diagnosis   7/30/18 LEFT POSTERIOR ILIAC CREST, BONE NEEDLE BIOPSY, SEDIMENT AND REED   STAINED SMEARS OF BONE MARROW ASPIRATE SHOWING:         PATCHY MARKED HYPERCELLULARITY WITH PRESENCE OF IRON STORES     RELATIVE LYMPHOCYTOSIS (46%) WITH T-LYMPHOCYTE HYPERPLASIA     ERYTHROID HYPERPLASIA (ME RATIO IS 1.2:1)     MARKED DECREASE IN BANDS AND SEGMENTED NEUTROPHILS (5 AND 1% RESPECTIVELY)     MEGAKARYOCYTES ARE PRESENT     NO FOREIGN CELLS OR GRANULOMATA IDENTIFIED   ADDENDUM COMMENT   Additional immunostains are performed (CD4, CD8, CD5 and CD7).  These   immunostains fail to demonstrate significant aberrant staining (controls adequate).  There is a slight increased number of CD8 positive T-cells as compared to CD4 positive cells.  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 This is of uncertain clinical significance. A portion of the bone marrow was sent to [facility] for cytogenetic studies including FISH. Isidra Nassar results are as follows. (Please see their separate report [id number] for complete results). Modal # of Chromosomes:     46          No. of Cells Counted:     20   Staining Method:          GTG;FISH     No. of Cells Analyzed:     20   No. and/or Type of Cultures:     24MF;48CM     Hypomodal Cells:     2   No. of Karyograms:     6          Hypermodal Cells:     1     Karyotype:   46,XY[20]. nuc   tiffany(D5S23,EGR1)x2[200],(CEP7,Y6Z211)x2[200],(V8J152,CEP8)x2[200],   (TEL20p,Y04J693)x2[200]     Cytogenetic Diagnosis   A.     Normal male chromosome complement   B.      FISH: No evidence of -5/del(5q), -7/del(7q) or -20/del(20q)   C.      FISH: No evidence of +8     Interpretation:   Cytogenetic examination of twenty metaphase cells showed a normal   karyotype with no consistent numerical or structural chromosome   aberrations observed.       Fluorescence in situ hybridization (FISH) analysis was performed using   1) the LSI 5q EGR1 (5q31, [employer]Orange)/D5S23,O3J759 (5p15.2, SpectrumGreen) Dual Color DNA probe set ([employer]) which detects deletions of 5q31 containing the EGR1 locus and can be used to identify loss of chromosome 5; 2) the LSI U5D989 (7q31, SpectrumOrange)/CEP7 (7 centromere, SpectrumGreen) Dual Color DNA probe set ([employer]ysis) which detects deletions of 7q31 and may be used to identify loss of chromosome 7;  3) the LSI CEP 8 (SpectrumOrange)/[id number] (8p telomere, SpectrumGreen) probe combination which allows enumeration of chromosome 8 centromere and may be used to detect gain or loss of chromosome 8; and 4) the 20p TEL (20p13, SpectrumGreen)/LSI [id number] (20q12, SpectrumOrange) probe combination which detects deletion of 20q. Examination of 200 interphase nuclei with each probe revealed normal hybridization patterns. Please note that this analysis does not eliminate the possibility of single gene defects, chromosomal mosaicism involving abnormal cell lines of low frequency, small structural chromosome abnormalities, or failure to sample any malignant clone(s) that may be present.     FLOW CYTOMETRIC ANALYSIS OF PERIPHERAL BLOOD PERFORMED AT [employer] (T-CELL CLONALITY BY FLOW CYTOMETRY OF TCR V-BETA)   REPORTS NO EVIDENCE OF A MONOCLONAL T-CELL POPULATION.      BONE MARROW, ASPIRATE, BIOPSY, AND CLOT SECTIONS DEMONSTRATING:          HYPERCELLULAR HEMATOPOIETIC MARROW (80%) WITH INCREASED   T-LYMPHOCYTES (20-40%)     FLOW CYTOMETRIC ANALYSIS OF THE BONE MARROW FOR T-CELL CLONALITY   PERFORMED AT [employer] (T-CELL CLONALITY BY FLOW CYTOMETRY TCR   V-BETA) REPORTS NO EVIDENCE OF A MONOCLONAL T-CELL POPULATION   IMAGING DATA:    Reviewed  PET scan 8/27/18  Impression   1. No evidence of metabolically active lymphadenopathy. 2. Mild generalized uptake within the osseous structures without   corresponding CT abnormality as well as splenomegaly which are likely due to   history of  immunoproliferative disease.    3. Focal uptake in the right perianal region with associated mild soft tissue   prominence, please correlate with direct physical examination. 4. Chronic findings as described including cholelithiasis. ASSESSMENT:    Mr Mikey Irene is 80 y.o. YOM with chronic neutropenia. Other work-up including at [facility] clinic suggested adult T-cell lymphoma. He was started on methotrexate and so far responded very well. Neutropenia resolved. His fatigue has improved  Patient's questions were sought and answered to his satisfaction. He agreed with the plan. PLAN:   I reviewed his recent lab work, discussed diagnosis and treatment recommendations  His WBC, platelets and hemoglobin are within normal limits  He is tolerating methotrexate well  Return to clinic in 6 months with labs prior      I spent more than 40 minutes examining, evaluating, reviewing data and counseling the patient. Greater than 50% of that time was spent face-to-face with the patient in counseling and coordinating her care.     Argentina Lesch Shelke,MD  Hematology/Oncology    CC: Marissa Villalobos PA-C

## 2022-06-14 NOTE — TELEPHONE ENCOUNTER
RTC in 6 months with cbc    RV scheduled 12/8/22 @ 10:45am    PT was given AVS and appt schedule    Electronically signed by Lambert Topete on 6/14/2022 at 12:07 PM

## 2022-08-29 DIAGNOSIS — C91.10 CHRONIC LARGE GRANULAR LYMPHOCYTIC LEUKEMIA (HCC): ICD-10-CM

## 2022-10-15 NOTE — TELEPHONE ENCOUNTER
Left message for patient to call us back to reschedule his appointment. I did let him know that Enedina Bedolla from IR has been trying to call him for the bone marrow biopsy. I asked him to give us a call and also to call us. without support/fair balance

## 2022-10-24 ENCOUNTER — OFFICE VISIT (OUTPATIENT)
Dept: INTERNAL MEDICINE CLINIC | Age: 72
End: 2022-10-24
Payer: MEDICARE

## 2022-10-24 VITALS
HEART RATE: 81 BPM | SYSTOLIC BLOOD PRESSURE: 136 MMHG | DIASTOLIC BLOOD PRESSURE: 74 MMHG | OXYGEN SATURATION: 97 % | BODY MASS INDEX: 29.29 KG/M2 | WEIGHT: 176 LBS

## 2022-10-24 DIAGNOSIS — J06.9 VIRAL UPPER RESPIRATORY TRACT INFECTION: Primary | ICD-10-CM

## 2022-10-24 DIAGNOSIS — E11.9 TYPE 2 DIABETES MELLITUS WITHOUT COMPLICATION, WITHOUT LONG-TERM CURRENT USE OF INSULIN (HCC): ICD-10-CM

## 2022-10-24 PROCEDURE — 1123F ACP DISCUSS/DSCN MKR DOCD: CPT | Performed by: INTERNAL MEDICINE

## 2022-10-24 PROCEDURE — G8484 FLU IMMUNIZE NO ADMIN: HCPCS | Performed by: INTERNAL MEDICINE

## 2022-10-24 PROCEDURE — 2022F DILAT RTA XM EVC RTNOPTHY: CPT | Performed by: INTERNAL MEDICINE

## 2022-10-24 PROCEDURE — 3044F HG A1C LEVEL LT 7.0%: CPT | Performed by: INTERNAL MEDICINE

## 2022-10-24 PROCEDURE — 99203 OFFICE O/P NEW LOW 30 MIN: CPT | Performed by: INTERNAL MEDICINE

## 2022-10-24 PROCEDURE — G8427 DOCREV CUR MEDS BY ELIG CLIN: HCPCS | Performed by: INTERNAL MEDICINE

## 2022-10-24 PROCEDURE — 3017F COLORECTAL CA SCREEN DOC REV: CPT | Performed by: INTERNAL MEDICINE

## 2022-10-24 PROCEDURE — 4004F PT TOBACCO SCREEN RCVD TLK: CPT | Performed by: INTERNAL MEDICINE

## 2022-10-24 PROCEDURE — G8417 CALC BMI ABV UP PARAM F/U: HCPCS | Performed by: INTERNAL MEDICINE

## 2022-10-24 RX ORDER — LANCETS 33 GAUGE
1 EACH MISCELLANEOUS DAILY
Qty: 100 EACH | Refills: 3 | Status: SHIPPED | OUTPATIENT
Start: 2022-10-24

## 2022-10-24 ASSESSMENT — ENCOUNTER SYMPTOMS: SINUS COMPLAINT: 1

## 2022-10-24 NOTE — PROGRESS NOTES
[address]  [place] [address]  Dept: [phone]  Dept Fax: [phone]    Teresa Sarah is a 71 y.o. male who presents today for his medicalconditions/complaints as noted below. Teresa Sarah is c/o of New Patient (Nose congestion - covid test ) and Diabetes      HPI:     Diabetes  He presents for his follow-up diabetic visit. He has type 2 diabetes mellitus. His disease course has been stable. There are no hypoglycemic complications. There are no diabetic complications. Risk factors for coronary artery disease include male sex, hypertension and diabetes mellitus. Current diabetic treatment includes oral agent (monotherapy). He is compliant with treatment all of the time. He is following a diabetic diet. An ACE inhibitor/angiotensin II receptor blocker is being taken. Sinus Problem  This is a new problem. The current episode started in the past 7 days. The problem has been gradually improving since onset. There has been no fever. Associated symptoms include congestion. (Rhinorhea) Past treatments include oral decongestants. The treatment provided moderate relief.      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 180 tablet 2    metFORMIN (GLUCOPHAGE) 500 MG tablet TAKE 1 TABLET TWICE DAILY  WITH MEALS 180 tablet 3    hydroCHLOROthiazide (HYDRODIURIL) 25 MG tablet Take 1 tablet by mouth in the morning. 90 tablet 1    levothyroxine (SYNTHROID) 125 MCG tablet Take 1 tablet by mouth in the morning. 90 tablet 1    atorvastatin (LIPITOR) 10 MG tablet TAKE 1 TABLET DAILY 90 tablet 1    vitamin D3 (CHOLECALCIFEROL) 25 MCG (1000 UT) TABS tablet Take 1 tablet by mouth in the morning. 30 tablet 5    blood glucose test strips (ONETOUCH ULTRA) strip Test sugar once daily 100 each 5    Handicap Placard MISC by Does not apply route Expires in 2023 1 each 0    sildenafil (VIAGRA) 50 MG tablet Take 1-2 tablets by mouth daily as needed for Erectile Dysfunction 30 tablet 0     No current facility-administered medications for this visit. Allergies   Allergen Reactions    Morphine Other (See Comments)     Veins in arms turned blue       Health Maintenance   Topic Date Due    Diabetic retinal exam  Never done    Shingles vaccine (1 of 2) Never done    COVID-19 Vaccine (4 - Booster for Moderna series) 02/13/2022    DTaP/Tdap/Td vaccine (1 - Tdap) 12/05/2026 (Originally 8/30/1969)    A1C test (Diabetic or Prediabetic)  05/17/2023    Diabetic microalbuminuria test  05/17/2023    Lipids  05/17/2023    Diabetic foot exam  05/24/2023    Depression Screen  05/24/2023    Annual Wellness Visit (AWV)  05/25/2023    Colorectal Cancer Screen  12/04/2023    Flu vaccine  Completed    Pneumococcal 65+ years Vaccine  Completed    AAA screen  Completed    Hepatitis C screen  Completed    Hepatitis A vaccine  Aged Out    Hib vaccine  Aged Out    Meningococcal (ACWY) vaccine  Aged Out       Subjective:      Review of Systems   HENT:  Positive for congestion. All other systems reviewed and are negative. Objective:     Physical Exam  Vitals reviewed. Constitutional:       Appearance: He is well-developed. HENT:      Head: Normocephalic and atraumatic. Eyes:      Conjunctiva/sclera: Conjunctivae normal.      Pupils: Pupils are equal, round, and reactive to light. Neck:      Thyroid: No thyromegaly. Vascular: No JVD. Cardiovascular:      Rate and Rhythm: Normal rate and regular rhythm. Heart sounds: Normal heart sounds. No murmur heard. Pulmonary:      Effort: Pulmonary effort is normal.      Breath sounds: Normal breath sounds. Abdominal:      General: Bowel sounds are normal.      Palpations: Abdomen is soft. Musculoskeletal:         General: Normal range of motion. Cervical back: Normal range of motion and neck supple. Skin:     General: Skin is warm and dry. Neurological:      Mental Status: He is alert and oriented to person, place, and time. Deep Tendon Reflexes: Reflexes are normal and symmetric. /74 (Site: Right Upper Arm, Position: Sitting)   Pulse 81   Wt 176 lb (79.8 kg)   SpO2 97%   BMI 29.29 kg/m²       Assessment:       Diagnosis Orders   1. Viral upper respiratory tract infection        2. Type 2 diabetes mellitus without complication, without long-term current use of insulin ([address]  OneTouch Delica Lancets 17M MISC          Plan:      No follow-ups on file. Orders Placed This Encounter   Medications    OneTouch Delica Lancets 48G MISC     Si each by Does not apply route daily Test blood sugar daily     Dispense:  100 each     Refill:  3     No orders of the defined types were placed in this encounter. Patient given educational materials - see patient instructions. Discussed use, benefit, and side effects of prescribed medications. All patientquestions answered. Pt voiced understanding.     Electronically signed by Eddie Kurtz MD on 10/24/2022at 8:37 AM

## 2022-10-24 NOTE — PROGRESS NOTES
Visit Information    Have you changed or started any medications since your last visit including any over-the-counter medicines, vitamins, or herbal medicines? no   Are you having any side effects from any of your medications? -  no  Have you stopped taking any of your medications? Is so, why? -  no    Have you seen any other physician or provider since your last visit? No  Have you had any other diagnostic tests since your last visit? No  Have you been seen in the emergency room and/or had an admission to a hospital since we last saw you? No  Have you had your routine dental cleaning in the past 6 months? no    Have you activated your [employer] account? If not, what are your barriers?  Yes     Patient Care Team:  Miguel Jean PA-C as PCP - General (Physician Assistant)  Cecy Pride MD as PCP - Internal Medicine (Otolaryngology)  Miguel Jean PA-C [facility]  Sindhu Bain MD as Consulting Physician (Gastroenterology)  Laith Tilley MD as Consulting Physician (Hematology and Oncology)  Eh Donald RN as Registered Nurse (Oncology)    Medical History Review  Past Medical, Family, and Social History reviewed and does contribute to the patient presenting condition    Health Maintenance   Topic Date Due    Diabetic retinal exam  Never done    Shingles vaccine (1 of 2) Never done    COVID-19 Vaccine (4 - Booster for Garth Early series) 02/13/2022    DTaP/Tdap/Td vaccine (1 - Tdap) 12/05/2026 (Originally 8/30/1969)    A1C test (Diabetic or Prediabetic)  05/17/2023    Diabetic microalbuminuria test  05/17/2023    Lipids  05/17/2023    Diabetic foot exam  05/24/2023    Depression Screen  05/24/2023    Annual Wellness Visit (AWV)  05/25/2023    Colorectal Cancer Screen  12/04/2023    Flu vaccine  Completed    Pneumococcal 65+ years Vaccine  Completed    AAA screen  Completed    Hepatitis C screen  Completed    Hepatitis A vaccine  Aged Out    Hib vaccine  Aged Out    Meningococcal (ACWY) vaccine  Aged Out

## 2022-11-09 ENCOUNTER — TELEPHONE (OUTPATIENT)
Dept: INTERNAL MEDICINE CLINIC | Age: 72
End: 2022-11-09

## 2022-11-09 NOTE — TELEPHONE ENCOUNTER
Patient called to see if the pre auth for his lancets was ever faxed backed to [employer] as he still does not have his lancets. I checked on this and we never received the form for the doctor to fill out. [facility] will fax over new ones.

## 2022-11-14 ENCOUNTER — TELEPHONE (OUTPATIENT)
Dept: INTERNAL MEDICINE CLINIC | Age: 72
End: 2022-11-14

## 2022-11-14 DIAGNOSIS — E03.9 HYPOTHYROIDISM, UNSPECIFIED TYPE: ICD-10-CM

## 2022-11-14 DIAGNOSIS — K21.00 GASTROESOPHAGEAL REFLUX DISEASE WITH ESOPHAGITIS: ICD-10-CM

## 2022-11-14 RX ORDER — LEVOTHYROXINE SODIUM 0.12 MG/1
125 TABLET ORAL DAILY
Qty: 90 TABLET | Refills: 1 | Status: SHIPPED | OUTPATIENT
Start: 2022-11-14

## 2022-11-14 RX ORDER — OMEPRAZOLE 40 MG/1
CAPSULE, DELAYED RELEASE ORAL
Qty: 90 CAPSULE | Refills: 1 | Status: SHIPPED | OUTPATIENT
Start: 2022-11-14

## 2022-11-14 RX ORDER — HYDROCHLOROTHIAZIDE 25 MG/1
25 TABLET ORAL DAILY
Qty: 90 TABLET | Refills: 1 | Status: SHIPPED | OUTPATIENT
Start: 2022-11-14

## 2022-11-14 RX ORDER — ATORVASTATIN CALCIUM 10 MG/1
TABLET, FILM COATED ORAL
Qty: 90 TABLET | Refills: 1 | Status: SHIPPED | OUTPATIENT
Start: 2022-11-14

## 2022-11-14 NOTE — TELEPHONE ENCOUNTER
----- Message from Dominga Webster sent at 11/10/2022 10:40 AM EST -----  Subject: Message to Provider    QUESTIONS  Information for Provider? Juju Antoine with [facility] would like for office   to add a cover sheet when the rec is sent back with to Galilea Rust  ---------------------------------------------------------------------------  --------------  [id number]  [phone]; OK to leave message on voicemail  ---------------------------------------------------------------------------  --------------  SCRIPT ANSWERS  Relationship to Patient? Third Party  Third Party Type? Other  Other Third Party Type? [facility] on behalf of [email or web address]  Representative Name?  [employer]

## 2022-11-14 NOTE — TELEPHONE ENCOUNTER
Notified patient that the [address] form was received and will be signed and faxed today. Patient states that an new order may need to be faxed as well.

## 2022-11-15 ENCOUNTER — TELEPHONE (OUTPATIENT)
Dept: INTERNAL MEDICINE CLINIC | Age: 72
End: 2022-11-15

## 2022-11-15 DIAGNOSIS — C91.10 CHRONIC LARGE GRANULAR LYMPHOCYTIC LEUKEMIA (HCC): ICD-10-CM

## 2022-11-15 NOTE — TELEPHONE ENCOUNTER
Chula from [facility] called to check on papers they faxed to us. I spoke with patient who states that he does not want that done. Patient also stated that they reached out to him as well and he told them that he was not interested in getting that testing done.

## 2022-11-26 ENCOUNTER — OFFICE VISIT (OUTPATIENT)
Dept: FAMILY MEDICINE CLINIC | Age: 72
End: 2022-11-26
Payer: MEDICARE

## 2022-11-26 VITALS
DIASTOLIC BLOOD PRESSURE: 78 MMHG | HEIGHT: 65 IN | SYSTOLIC BLOOD PRESSURE: 138 MMHG | OXYGEN SATURATION: 100 % | BODY MASS INDEX: 29.16 KG/M2 | WEIGHT: 175 LBS | HEART RATE: 98 BPM | TEMPERATURE: 98.5 F

## 2022-11-26 DIAGNOSIS — T23.202A PARTIAL THICKNESS BURN OF LEFT HAND, UNSPECIFIED SITE OF HAND, INITIAL ENCOUNTER: ICD-10-CM

## 2022-11-26 DIAGNOSIS — J02.9 SORE THROAT: Primary | ICD-10-CM

## 2022-11-26 DIAGNOSIS — R49.0 HOARSE VOICE QUALITY: ICD-10-CM

## 2022-11-26 DIAGNOSIS — L03.90 CELLULITIS, UNSPECIFIED CELLULITIS SITE: ICD-10-CM

## 2022-11-26 LAB — S PYO AG THROAT QL: NORMAL

## 2022-11-26 PROCEDURE — 3078F DIAST BP <80 MM HG: CPT | Performed by: NURSE PRACTITIONER

## 2022-11-26 PROCEDURE — G8484 FLU IMMUNIZE NO ADMIN: HCPCS | Performed by: NURSE PRACTITIONER

## 2022-11-26 PROCEDURE — 87880 STREP A ASSAY W/OPTIC: CPT | Performed by: NURSE PRACTITIONER

## 2022-11-26 PROCEDURE — 3017F COLORECTAL CA SCREEN DOC REV: CPT | Performed by: NURSE PRACTITIONER

## 2022-11-26 PROCEDURE — G8427 DOCREV CUR MEDS BY ELIG CLIN: HCPCS | Performed by: NURSE PRACTITIONER

## 2022-11-26 PROCEDURE — 1123F ACP DISCUSS/DSCN MKR DOCD: CPT | Performed by: NURSE PRACTITIONER

## 2022-11-26 PROCEDURE — 99213 OFFICE O/P EST LOW 20 MIN: CPT | Performed by: NURSE PRACTITIONER

## 2022-11-26 PROCEDURE — 3074F SYST BP LT 130 MM HG: CPT | Performed by: NURSE PRACTITIONER

## 2022-11-26 PROCEDURE — 4004F PT TOBACCO SCREEN RCVD TLK: CPT | Performed by: NURSE PRACTITIONER

## 2022-11-26 PROCEDURE — G8417 CALC BMI ABV UP PARAM F/U: HCPCS | Performed by: NURSE PRACTITIONER

## 2022-11-26 RX ORDER — CEPHALEXIN 500 MG/1
500 CAPSULE ORAL 4 TIMES DAILY
Qty: 28 CAPSULE | Refills: 0 | Status: SHIPPED | OUTPATIENT
Start: 2022-11-26 | End: 2022-12-03

## 2022-11-26 SDOH — ECONOMIC STABILITY: FOOD INSECURITY: WITHIN THE PAST 12 MONTHS, YOU WORRIED THAT YOUR FOOD WOULD RUN OUT BEFORE YOU GOT MONEY TO BUY MORE.: NEVER TRUE

## 2022-11-26 SDOH — ECONOMIC STABILITY: FOOD INSECURITY: WITHIN THE PAST 12 MONTHS, THE FOOD YOU BOUGHT JUST DIDN'T LAST AND YOU DIDN'T HAVE MONEY TO GET MORE.: NEVER TRUE

## 2022-11-26 ASSESSMENT — ENCOUNTER SYMPTOMS
CHANGE IN BOWEL HABIT: 0
NAUSEA: 0
COUGH: 0
ABDOMINAL PAIN: 0
BURN: 1
VOMITING: 0
SORE THROAT: 1
VISUAL CHANGE: 0
SWOLLEN GLANDS: 0

## 2022-11-26 ASSESSMENT — SOCIAL DETERMINANTS OF HEALTH (SDOH): HOW HARD IS IT FOR YOU TO PAY FOR THE VERY BASICS LIKE FOOD, HOUSING, MEDICAL CARE, AND HEATING?: NOT HARD AT ALL

## 2022-11-26 NOTE — PROGRESS NOTES
[address] [address]  Dept: [phone]  Dept Fax: [phone]    José Bowman is a 71 y.o. male who presents to the urgent care today for his medical conditions/complaints as notedbelow. José Bowman is c/o of Burn (Left hand burn from oven rack) and Pharyngitis (Started on tuesday)      HPI:     71 yr old male presents for sore throat for 4 days. Neg home covid . T2DM, declines further covid testing. Called PCP office and they told him to come in for strep test.     Also c/o burn to left hand lateral side from oven rack 10 days ago. Has been applying neosporin but blistered and now red around area, more painful and drained some puss yesterday. Scratch to dorsum rt hand also scabbed and now red and more tender. No hx MRSA    Burn  The incident occurred more than 1 week ago. The burns occurred in the kitchen (oven rack). The burns occurred while cooking. The burns were a result of contact with a hot surface. The burns are located on the left hand. The pain is mild. He has tried salve for the symptoms. The treatment provided no relief. Pharyngitis  This is a new problem. The current episode started in the past 7 days. The problem occurs constantly. The problem has been unchanged. Associated symptoms include a sore throat. Pertinent negatives include no abdominal pain, anorexia, arthralgias, change in bowel habit, chest pain, chills, congestion, coughing, diaphoresis, fatigue, fever, headaches, joint swelling, myalgias, nausea, neck pain, numbness, rash, swollen glands, urinary symptoms, vertigo, visual change, vomiting or weakness. Nothing aggravates the symptoms. He has tried nothing for the symptoms. The treatment provided no relief.      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 25 g 0    cephALEXin (KEFLEX) 500 MG capsule Take 1 capsule by mouth 4 times daily for 7 days 28 capsule 0    methotrexate (RHEUMATREX) 2.5 MG chemo tablet TAKE 8 TABLETS BY MOUTH ONCE A WEEK 96 tablet 3    atorvastatin (LIPITOR) 10 MG tablet TAKE 1 TABLET DAILY 90 tablet 1    levothyroxine (SYNTHROID) 125 MCG tablet Take 1 tablet by mouth Daily 90 tablet 1    hydroCHLOROthiazide (HYDRODIURIL) 25 MG tablet Take 1 tablet by mouth daily 90 tablet 1    omeprazole (PRILOSEC) 40 MG delayed release capsule TAKE 1 CAPSULE DAILY 90 capsule 1    OneTouch Delica Lancets 36T MISC 1 each by Does not apply route daily Test blood sugar daily 100 each 3    enalapril (VASOTEC) 20 MG tablet Take 1 tablet by mouth in the morning and 1 tablet before bedtime. 180 tablet 2    metFORMIN (GLUCOPHAGE) 500 MG tablet TAKE 1 TABLET TWICE DAILY  WITH MEALS 180 tablet 3    blood glucose test strips (ONETOUCH ULTRA) strip Test sugar once daily 100 each 5    Handicap Placard MISC by Does not apply route Expires in 2023 1 each 0    vitamin D3 (CHOLECALCIFEROL) 25 MCG (1000 UT) TABS tablet Take 1 tablet by mouth in the morning. (Patient not taking: Reported on 11/26/2022) 30 tablet 5    sildenafil (VIAGRA) 50 MG tablet Take 1-2 tablets by mouth daily as needed for Erectile Dysfunction (Patient not taking: Reported on 11/26/2022) 30 tablet 0     No current facility-administered medications for this visit.      Allergies   Allergen Reactions    Morphine Other (See Comments)     Veins in arms turned blue       Subjective:      Review of Systems   Constitutional: Negative for chills, diaphoresis, fatigue and fever. HENT:  Positive for sore throat. Negative for congestion. Respiratory:  Negative for cough. Cardiovascular:  Negative for chest pain. Gastrointestinal:  Negative for abdominal pain, anorexia, change in bowel habit, nausea and vomiting. Musculoskeletal:  Negative for arthralgias, joint swelling, myalgias and neck pain. Skin:  Negative for rash. Neurological:  Negative for vertigo, weakness, numbness and headaches. All other systems reviewed and are negative. 14 systems reviewed and negative except as listed in HPI. Objective:     Physical Exam  Vitals and nursing note reviewed. Constitutional:       General: He is not in acute distress. Appearance: Normal appearance. He is well-developed. He is not ill-appearing, toxic-appearing or diaphoretic. HENT:      Head: Normocephalic and atraumatic. Right Ear: Tympanic membrane, ear canal and external ear normal.      Left Ear: Tympanic membrane, ear canal and external ear normal.      Nose: Rhinorrhea present. Mouth/Throat:      Mouth: Mucous membranes are moist.      Pharynx: Posterior oropharyngeal erythema present. No oropharyngeal exudate. Comments: Swallows without difficulty  Thin white PND  Eyes:      General: No scleral icterus. Right eye: No discharge. Left eye: No discharge. Extraocular Movements: Extraocular movements intact. Conjunctiva/sclera: Conjunctivae normal.      Pupils: Pupils are equal, round, and reactive to light. Cardiovascular:      Rate and Rhythm: Normal rate and regular rhythm. Pulses: Normal pulses. Heart sounds: Normal heart sounds. Pulmonary:      Effort: Pulmonary effort is normal. No respiratory distress. Breath sounds: Normal breath sounds. No stridor. No wheezing, rhonchi or rales. Chest:      Chest wall: No tenderness. Abdominal:      General: Bowel sounds are normal. There is no distension. Palpations: Abdomen is soft. Tenderness: There is no abdominal tenderness. Musculoskeletal:         General: Normal range of motion. Cervical back: Normal range of motion and neck supple. Lymphadenopathy:      Cervical: No cervical adenopathy. Skin:     General: Skin is warm and dry. Capillary Refill: Capillary refill takes less than 2 seconds. Findings: Erythema present. No rash (no rash to visible skin). Comments: Dorsum rt hand with scab with localized surrounding redness and warmth, no red streaking, no drng or swelling  Lft hand lateral side with ruptured blister, no drng, some localized redness to area along with tenderness. No red streaking away from area. Neurological:      General: No focal deficit present. Mental Status: He is alert and oriented to person, place, and time. Motor: No abnormal muscle tone. Coordination: Coordination normal.   Psychiatric:         Mood and Affect: Mood normal.         Behavior: Behavior normal.     /78   Pulse 98   Temp 98.5 °F (36.9 °C)   Ht 5' 5\" (1.651 m)   Wt 175 lb (79.4 kg)   SpO2 100%   BMI 29.12 kg/m²     Assessment:       Diagnosis Orders   1. Sore throat  POCT rapid strep A      2. Hoarse voice quality        3. Partial thickness burn of left hand, unspecified site of hand, initial encounter        4. Cellulitis, unspecified cellulitis site            Plan:      Results for POC orders placed in visit on 11/26/22   POCT rapid strep A   Result Value Ref Range    Strep A Ag None Detected None Detected     Reports PCP office told him to come for strep test  POCT strep neg  I believe the sore throat is from PND  Reviewed over-the-counter treatments for symptom management.   Declines covid test - neg at home and has more tests he can take at home PRN      Burn site and scabbed area on rt hand appear to be getting infected, had been using neosporin - no relief, no drng today to culture  Keflex rx  Silvadene crm to burn  Keep affected areas clean and dry  Sx worsening infection reviewed  Return if symptoms worsen or fail to improve, for make appt with Family Doc in 3-4 days for recheck. Orders Placed This Encounter   Medications    silver sulfADIAZINE (SILVADENE) 1 % cream     Sig: Apply topically 1-2 times daily. Dispense:  25 g     Refill:  0    cephALEXin (KEFLEX) 500 MG capsule     Sig: Take 1 capsule by mouth 4 times daily for 7 days     Dispense:  28 capsule     Refill:  0         Patient given educational materials - see patient instructions. Discussed use, benefit, and side effects of prescribed medications. All patient questions answered. Pt voicedunderstanding.     Electronically signed by Dennie Cordoba, APRN - CNP on 11/26/2022 at 12:10 PM

## 2023-02-07 ENCOUNTER — OFFICE VISIT (OUTPATIENT)
Dept: ONCOLOGY | Age: 73
End: 2023-02-07
Payer: MEDICARE

## 2023-02-07 ENCOUNTER — HOSPITAL ENCOUNTER (OUTPATIENT)
Age: 73
Discharge: HOME | End: 2023-02-07
Payer: MEDICARE

## 2023-02-07 ENCOUNTER — TELEPHONE (OUTPATIENT)
Dept: ONCOLOGY | Age: 73
End: 2023-02-07

## 2023-02-07 VITALS
SYSTOLIC BLOOD PRESSURE: 130 MMHG | HEART RATE: 76 BPM | TEMPERATURE: 97.7 F | BODY MASS INDEX: 29.15 KG/M2 | DIASTOLIC BLOOD PRESSURE: 78 MMHG | WEIGHT: 175.2 LBS

## 2023-02-07 DIAGNOSIS — C91.10 CHRONIC LARGE GRANULAR LYMPHOCYTIC LEUKEMIA (HCC): ICD-10-CM

## 2023-02-07 LAB
ABSOLUTE EOS #: 0.06 K/UL (ref 0–0.4)
ABSOLUTE LYMPH #: 0.98 K/UL (ref 1–4.8)
ABSOLUTE MONO #: 0.56 K/UL (ref 0.1–1.2)
BASOPHILS # BLD: 1 % (ref 0–2)
BASOPHILS ABSOLUTE: 0.03 K/UL (ref 0–0.2)
EOSINOPHILS RELATIVE PERCENT: 1 % (ref 1–4)
HCT VFR BLD AUTO: 43.3 % (ref 41–53)
HGB BLD-MCNC: 14.5 G/DL (ref 13.5–17.5)
LYMPHOCYTES # BLD: 16 % (ref 24–44)
MCH RBC QN AUTO: 31.7 PG (ref 26–34)
MCHC RBC AUTO-ENTMCNC: 33.5 G/DL (ref 31–37)
MCV RBC AUTO: 94.5 FL (ref 80–100)
MONOCYTES # BLD: 9 % (ref 2–11)
PDW BLD-RTO: 13.8 % (ref 12.5–15.4)
PLATELET # BLD AUTO: 182 K/UL (ref 140–450)
PMV BLD AUTO: 10.1 FL (ref 8–14)
RBC # BLD: 4.58 M/UL (ref 4.5–5.9)
SEG NEUTROPHILS: 73 % (ref 36–66)
SEGMENTED NEUTROPHILS ABSOLUTE COUNT: 4.48 K/UL (ref 1.8–7.7)
WBC # BLD AUTO: 6.1 K/UL (ref 3.5–11)

## 2023-02-07 PROCEDURE — 99214 OFFICE O/P EST MOD 30 MIN: CPT | Performed by: INTERNAL MEDICINE

## 2023-02-07 PROCEDURE — G8427 DOCREV CUR MEDS BY ELIG CLIN: HCPCS | Performed by: INTERNAL MEDICINE

## 2023-02-07 PROCEDURE — 3078F DIAST BP <80 MM HG: CPT | Performed by: INTERNAL MEDICINE

## 2023-02-07 PROCEDURE — 36415 COLL VENOUS BLD VENIPUNCTURE: CPT

## 2023-02-07 PROCEDURE — G8484 FLU IMMUNIZE NO ADMIN: HCPCS | Performed by: INTERNAL MEDICINE

## 2023-02-07 PROCEDURE — 4004F PT TOBACCO SCREEN RCVD TLK: CPT | Performed by: INTERNAL MEDICINE

## 2023-02-07 PROCEDURE — 3075F SYST BP GE 130 - 139MM HG: CPT | Performed by: INTERNAL MEDICINE

## 2023-02-07 PROCEDURE — G8417 CALC BMI ABV UP PARAM F/U: HCPCS | Performed by: INTERNAL MEDICINE

## 2023-02-07 PROCEDURE — 1123F ACP DISCUSS/DSCN MKR DOCD: CPT | Performed by: INTERNAL MEDICINE

## 2023-02-07 PROCEDURE — 3017F COLORECTAL CA SCREEN DOC REV: CPT | Performed by: INTERNAL MEDICINE

## 2023-02-07 PROCEDURE — 85025 COMPLETE CBC W/AUTO DIFF WBC: CPT

## 2023-02-07 NOTE — PROGRESS NOTES
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 He reports that he was seen by his PCP in October 2013, for routine follow up visit. His Lab work at that time revealed low WBC 3.0K. So his lab work was repeated by his PCP in  Jan 2014. Which again revealed WBC 2.6 and ANC of 0.5, so he was referred to our hematology clinic for further evaluation and recommendations. His WBC have been stable for last few months, however his iron levels were mildly low so I started him on PO iron. INTERVAL HISTORY:  Maria Fernanda Ni is returning for follow-up visit and to discuss lab results and further recommendations. He is tolerating treatment well. Currently he is on methotrexate once a week. His recent lab work showed normal WBC, hemoglobin and platelets. Denies any unintentional weight loss, drenching night sweats fever chills. Denies any new lymphadenopathy. During this visit patient's allergy, social, medical, surgical history and medications were reviewed and updated. Current Outpatient Medications   Medication Sig Dispense Refill    methotrexate (RHEUMATREX) 2.5 MG chemo tablet TAKE 8 TABLETS BY MOUTH ONCE A WEEK 96 tablet 3    silver sulfADIAZINE (SILVADENE) 1 % cream Apply topically 1-2 times daily.  25 g 0    atorvastatin (LIPITOR) 10 MG tablet TAKE 1 TABLET DAILY 90 tablet 1    levothyroxine (SYNTHROID) 125 MCG tablet Take 1 tablet by mouth Daily 90 tablet 1    hydroCHLOROthiazide (HYDRODIURIL) 25 MG tablet Take 1 tablet by mouth daily 90 tablet 1    omeprazole (PRILOSEC) 40 MG delayed release capsule TAKE 1 CAPSULE DAILY 90 capsule 1    OneTouch Delica Lancets 33G MISC 1 each by Does not apply route daily Test blood sugar daily 100 each 3    enalapril (VASOTEC) 20 MG tablet Take 1 tablet by mouth in the morning and 1 tablet before bedtime. 180 tablet 2    metFORMIN (GLUCOPHAGE) 500 MG tablet TAKE 1 TABLET TWICE DAILY  WITH MEALS 180 tablet 3    blood glucose test strips (ONETOUCH ULTRA) strip Test sugar once daily 100 each 5    Handicap Placard MISC by Does not apply route Expires in 2023 1 each 0    vitamin D3 (CHOLECALCIFEROL) 25 MCG (1000 UT) TABS tablet Take 1 tablet by mouth in the morning. (Patient not taking: No sig reported) 30 tablet 5    sildenafil (VIAGRA) 50 MG tablet Take 1-2 tablets by mouth daily as needed for Erectile Dysfunction (Patient not taking: No sig reported) 30 tablet 0     No current facility-administered medications for this visit.     REVIEW OF SYSTEM:     Constitutional: No fever or chills. No night sweats, no weight loss, some fatigue late during the day  Eyes: No eye discharge, double vision, or eye pain   HEENT: negative for sore mouth, sore throat, hoarseness and voice change   Respiratory: negative for cough , sputum, dyspnea, wheezing, hemoptysis, chest pain   Cardiovascular: negative for chest pain, dyspnea, palpitations, orthopnea, PND   Gastrointestinal: negative for nausea, vomiting, diarrhea, constipation, abdominal pain, Dysphagia, hematemesis and hematochezia, +rectal pain.  Genitourinary: negative for frequency, dysuria, nocturia, urinary incontinence, and hematuria   Integument: ++ Edematous macular skin rash over torso, upper and lower extremity  Hematologic/Lymphatic: negative for easy bruising, bleeding, lymphadenopathy, petechiae and swelling/edema   Endocrine: negative for heat or cold intolerance, tremor, weight changes, change in bowel habits and hair loss   Musculoskeletal: negative for myalgias, arthralgias, pain, joint swelling,and bone pain  Neurological: negative for headaches, dizziness, seizures, weakness, numbness     OBJECTIVE:         Vitals:    02/07/23 1041   BP: 130/78   Pulse: 76   Temp: 97.7 °F (36.5 °C)      PHYSICAL EXAM:   General appearance - well appearing, no in pain or distress   Mental status - alert and cooperative   Eyes - pupils equal and reactive, extraocular eye movements intact   Ears - bilateral TM's and external ear canals normal   Mouth - mucous membranes moist, pharynx normal without lesions   Neck - supple, no significant adenopathy   Lymphatics - no palpable lymphadenopathy, no hepatosplenomegaly   Chest - clear to auscultation, no wheezes, rales or rhonchi, symmetric air entry   Heart - normal rate, regular rhythm, normal S1, S2, no murmurs, rubs, clicks or gallops   Abdomen - soft, nontender, nondistended, no masses or organomegaly   Neurological - alert, oriented, normal speech, no focal findings or movement disorder noted   Musculoskeletal - no joint tenderness, deformity or swelling   Extremities - peripheral pulses normal, no pedal edema, no clubbing or cyanosis   Skin - ++ Edematous macular skin rash over torso, upper and lower extremity    LABORATORY DATA:     Lab Results   Component Value Date    WBC 6.1 02/07/2023    HGB 14.5 02/07/2023    HCT 43.3 02/07/2023    MCV 94.5 02/07/2023     02/07/2023       Chemistry        Component Value Date/Time     12/07/2021 1116    K 3.3 (L) 12/07/2021 1116     12/07/2021 1116    CO2 23 12/07/2021 1116    BUN 15 12/07/2021 1116    CREATININE 1.04 12/07/2021 1116        Component Value Date/Time    CALCIUM 9.5 12/07/2021 1116    ALKPHOS 94 12/07/2021 1116    AST 19 12/07/2021 1116    ALT 24 12/07/2021 1116    BILITOT 0.46 12/07/2021 1116        PATHOLOGY DATA:   Final Diagnosis  SPECIMEN \"A\": DUODENUM, BIOPSY:    DUODENAL TYPE MUCOSA DEMONSTRATING NO SIGNIFICANT HISTOPATHOLOGIC  FEATURES    THE FEATURES OF SPRUE ARE NOT IDENTIFIED   SPECIMEN \"B\": LOWER RIGHT COLON, BIOPSY:  TUBULAR ADENOMA   SPECIMEN \"C\": ILEOCECAL VALVE, BIOPSY:    BENIGN SMALL INTESTINAL TYPE MUCOSA WITH FEATURES OF  PSEUDOLIPOMATOSIS  SCANT FRAGMENT OF BENIGN FIBROADIPOSE TISSUE     Final Diagnosis   7/30/18 LEFT POSTERIOR ILIAC CREST, BONE NEEDLE BIOPSY, SEDIMENT AND REED   STAINED SMEARS OF BONE MARROW ASPIRATE SHOWING:         PATCHY MARKED HYPERCELLULARITY WITH PRESENCE OF IRON STORES     RELATIVE LYMPHOCYTOSIS (46%) WITH T-LYMPHOCYTE HYPERPLASIA     ERYTHROID HYPERPLASIA (ME RATIO IS 1.2:1)     MARKED DECREASE IN BANDS AND SEGMENTED NEUTROPHILS (5 AND 1% RESPECTIVELY)     MEGAKARYOCYTES ARE PRESENT     NO FOREIGN CELLS OR GRANULOMATA IDENTIFIED   ADDENDUM COMMENT   Additional immunostains are performed (CD4, CD8, CD5 and CD7). These   immunostains fail to demonstrate significant aberrant staining (controls adequate). There is a slight increased number of CD8 positive T-cells as compared to CD4 positive cells. 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 This is of uncertain clinical significance. A portion of the bone marrow was sent to [facility] for cytogenetic studies including FISH. Their results are as follows. (Please see their separate report [id number] for complete results). Modal # of Chromosomes:     46          No. of Cells Counted:     20   Staining Method:          GTG;FISH     No. of Cells Analyzed:     20   No. and/or Type of Cultures:     24MF;48CM     Hypomodal Cells:     2   No. of Karyograms:     6          Hypermodal Cells:     1     Karyotype:   46,XY[20]. nuc   tiffany(D5S23,EGR1)x2[200],(CEP7,G8M483)x2[200],(F2K865,CEP8)x2[200],   (TEL20p,P49Y865)x2[200]     Cytogenetic Diagnosis   A. Normal male chromosome complement   B. FISH: No evidence of -5/del(5q), -7/del(7q) or -20/del(20q)   C.       FISH: No evidence of +8     Interpretation:   Cytogenetic examination of twenty metaphase cells showed a normal   karyotype with no consistent numerical or structural chromosome   aberrations observed. Fluorescence in situ hybridization (FISH) analysis was performed using   1) the LSI 5q EGR1 (5q31, SpectrumOrange)/D5S23,C3Z719 (5p15.2,   SpectrumGreen) Dual Color DNA probe set (Vysis) which detects deletions of 5q31 containing the EGR1 locus and can be used to identify loss of chromosome 5; 2) the LSI V7F586 (7q31, SpectrumOrange)/CEP7 (7 centromere, SpectrumGreen) Dual Color DNA probe set (Vysis) which detects deletions of 7q31 and may be used to identify loss of chromosome 7;  3) the LSI CEP 8 (SpectrumOrange)/G0G927 (8p telomere, SpectrumGreen) probe combination which allows enumeration of chromosome 8 centromere and may be used to detect gain or loss of chromosome 8; and 4) the 20p TEL (20p13, SpectrumGreen)/LSI [id number] (20q12, SpectrumOrange) probe combination which detects deletion of 20q. Examination of 200 interphase nuclei with each probe revealed normal hybridization patterns. Please note that this analysis does not eliminate the possibility of single gene defects, chromosomal mosaicism involving abnormal cell lines of low frequency, small structural chromosome abnormalities, or failure to sample any malignant clone(s) that may be present. FLOW CYTOMETRIC ANALYSIS OF PERIPHERAL BLOOD PERFORMED AT [facility] (T-CELL CLONALITY BY FLOW CYTOMETRY OF TCR V-BETA)   REPORTS NO EVIDENCE OF A MONOCLONAL T-CELL POPULATION. BONE MARROW, ASPIRATE, BIOPSY, AND CLOT SECTIONS DEMONSTRATING:          HYPERCELLULAR HEMATOPOIETIC MARROW (80%) WITH INCREASED   T-LYMPHOCYTES (20-40%)     FLOW CYTOMETRIC ANALYSIS OF THE BONE MARROW FOR T-CELL CLONALITY   PERFORMED AT [facility] (T-CELL CLONALITY BY FLOW CYTOMETRY TCR   V-BETA) REPORTS NO EVIDENCE OF A MONOCLONAL T-CELL POPULATION   IMAGING DATA:    Reviewed  PET scan 8/27/18  Impression   1.  No evidence of metabolically active lymphadenopathy. 2. Mild generalized uptake within the osseous structures without   corresponding CT abnormality as well as splenomegaly which are likely due to   history of  immunoproliferative disease. 3. Focal uptake in the right perianal region with associated mild soft tissue   prominence, please correlate with direct physical examination. 4. Chronic findings as described including cholelithiasis. ASSESSMENT:    Mr Jackie Reid is 71 y.o.  YOM with chronic neutropenia. Other work-up including at [facility] clinic suggested adult T-cell lymphoma. He was started on methotrexate and so far responded very well. Neutropenia resolved. His fatigue has improved  Patient's questions were sought and answered to his satisfaction. He agreed with the plan. PLAN:   I reviewed the results of recent lab work, discussed diagnosis, treatment recommendations   Recent lab work showed stable hemoglobin, WBC and platelets   He is currently on methotrexate once a week and tolerating well   Return to clinic in 6 months with labs prior or earlier if needed     I spent more than 40 minutes examining, evaluating, reviewing data and counseling the patient. Greater than 50% of that time was spent face-to-face with the patient in counseling and coordinating her care.     Aleta Narayanan MD  Hematology/Oncology    CC: Dean Miguel MD

## 2023-02-07 NOTE — TELEPHONE ENCOUNTER
AVS from 2/7/23      RTC in 6 months with labs    *rv is sched for [facility] w [facility]    PT was given AVS and appt schedule

## 2023-02-17 ENCOUNTER — TELEPHONE (OUTPATIENT)
Dept: INTERNAL MEDICINE CLINIC | Age: 73
End: 2023-02-17

## 2023-02-17 NOTE — TELEPHONE ENCOUNTER
Patient state his Handi-cap placard will expires March 1st. He would like a renewal letter and will pick it up.     Please advise

## 2023-04-24 ENCOUNTER — OFFICE VISIT (OUTPATIENT)
Dept: INTERNAL MEDICINE CLINIC | Age: 73
End: 2023-04-24
Payer: COMMERCIAL

## 2023-04-24 VITALS
DIASTOLIC BLOOD PRESSURE: 66 MMHG | HEART RATE: 86 BPM | OXYGEN SATURATION: 96 % | SYSTOLIC BLOOD PRESSURE: 128 MMHG | BODY MASS INDEX: 30.29 KG/M2 | WEIGHT: 182 LBS

## 2023-04-24 DIAGNOSIS — Z13.220 SCREENING FOR HYPERLIPIDEMIA: ICD-10-CM

## 2023-04-24 DIAGNOSIS — D70.4 CYCLICAL NEUTROPENIA (HCC): ICD-10-CM

## 2023-04-24 DIAGNOSIS — E03.9 HYPOTHYROIDISM, UNSPECIFIED TYPE: ICD-10-CM

## 2023-04-24 DIAGNOSIS — D47.9 LYMPHOPROLIFERATIVE DISORDER (HCC): ICD-10-CM

## 2023-04-24 DIAGNOSIS — E11.9 TYPE 2 DIABETES MELLITUS WITHOUT COMPLICATION, WITHOUT LONG-TERM CURRENT USE OF INSULIN (HCC): Primary | ICD-10-CM

## 2023-04-24 PROCEDURE — 1123F ACP DISCUSS/DSCN MKR DOCD: CPT | Performed by: INTERNAL MEDICINE

## 2023-04-24 PROCEDURE — 3078F DIAST BP <80 MM HG: CPT | Performed by: INTERNAL MEDICINE

## 2023-04-24 PROCEDURE — 3074F SYST BP LT 130 MM HG: CPT | Performed by: INTERNAL MEDICINE

## 2023-04-24 PROCEDURE — 99213 OFFICE O/P EST LOW 20 MIN: CPT | Performed by: INTERNAL MEDICINE

## 2023-04-24 SDOH — ECONOMIC STABILITY: FOOD INSECURITY: WITHIN THE PAST 12 MONTHS, YOU WORRIED THAT YOUR FOOD WOULD RUN OUT BEFORE YOU GOT MONEY TO BUY MORE.: NEVER TRUE

## 2023-04-24 SDOH — ECONOMIC STABILITY: INCOME INSECURITY: HOW HARD IS IT FOR YOU TO PAY FOR THE VERY BASICS LIKE FOOD, HOUSING, MEDICAL CARE, AND HEATING?: NOT HARD AT ALL

## 2023-04-24 SDOH — ECONOMIC STABILITY: HOUSING INSECURITY
IN THE LAST 12 MONTHS, WAS THERE A TIME WHEN YOU DID NOT HAVE A STEADY PLACE TO SLEEP OR SLEPT IN A SHELTER (INCLUDING NOW)?: NO

## 2023-04-24 SDOH — ECONOMIC STABILITY: FOOD INSECURITY: WITHIN THE PAST 12 MONTHS, THE FOOD YOU BOUGHT JUST DIDN'T LAST AND YOU DIDN'T HAVE MONEY TO GET MORE.: NEVER TRUE

## 2023-04-24 ASSESSMENT — PATIENT HEALTH QUESTIONNAIRE - PHQ9
SUM OF ALL RESPONSES TO PHQ QUESTIONS 1-9: 0
SUM OF ALL RESPONSES TO PHQ9 QUESTIONS 1 & 2: 0
SUM OF ALL RESPONSES TO PHQ QUESTIONS 1-9: 0
2. FEELING DOWN, DEPRESSED OR HOPELESS: 0
SUM OF ALL RESPONSES TO PHQ QUESTIONS 1-9: 0
1. LITTLE INTEREST OR PLEASURE IN DOING THINGS: 0
SUM OF ALL RESPONSES TO PHQ QUESTIONS 1-9: 0

## 2023-04-24 NOTE — PROGRESS NOTES
[address] [facility] [address]  [place] [address]  Dept: [phone]  Dept Fax: [phone]    Melville Babinski is a 71 y.o. male who presents today for his medicalconditions/complaints as noted below. Melville Babinski is c/o of Diabetes      HPI:     Diabetes  He presents for his follow-up diabetic visit. He has type 2 diabetes mellitus. His disease course has been stable. There are no hypoglycemic complications. Symptoms are stable. There are no diabetic complications. Risk factors for coronary artery disease include diabetes mellitus, hypertension and dyslipidemia. Current diabetic treatment includes oral agent (monotherapy). There is no change in his home blood glucose trend. An ACE inhibitor/angiotensin II receptor blocker is being taken. Hypertension  This is a chronic problem. The current episode started more than 1 year ago. The problem is controlled. Past treatments include ACE inhibitors. The current treatment provides significant improvement. There are no compliance problems. Has lymphoproliferative disorder. Is followed by haematology Dr. Navin Velasco. Stable at present time.     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

## 2023-04-24 NOTE — PROGRESS NOTES
Visit Information    Have you changed or started any medications since your last visit including any over-the-counter medicines, vitamins, or herbal medicines? no   Are you having any side effects from any of your medications? -  no  Have you stopped taking any of your medications? Is so, why? -  no    Have you seen any other physician or provider since your last visit? Yes - Records Obtained  Have you had any other diagnostic tests since your last visit? Yes - Records Obtained  Have you been seen in the emergency room and/or had an admission to a hospital since we last saw you? No  Have you had your routine dental cleaning in the past 6 months? no    Have you activated your [employer] account? If not, what are your barriers?  Yes     Patient Care Team:  Eddie Kurtz MD as PCP - General (Internal Medicine)  Cj Pérez MD as PCP - Internal Medicine (Otolaryngology)  Eddie Kurtz MD as PCP - Empaneled Provider  Hermann Joya MD as Consulting Physician (Gastroenterology)  Jose David Yuan MD as Consulting Physician (Hematology and Oncology)  Mike Alva RN as Registered Nurse (Oncology)    Medical History Review  Past Medical, Family, and Social History reviewed and does contribute to the patient presenting condition    Health Maintenance   Topic Date Due    Diabetic retinal exam  Never done    Shingles vaccine (1 of 2) Never done    COVID-19 Vaccine (4 - Booster for Juanita Smoke series) 02/13/2022    GFR test (Diabetes, CKD 3-4, OR last GFR 15-59)  12/07/2022    A1C test (Diabetic or Prediabetic)  05/17/2023    Diabetic Alb to Cr ratio (uACR) test  05/17/2023    Lipids  05/17/2023    Diabetic foot exam  05/24/2023    Depression Screen  05/24/2023    DTaP/Tdap/Td vaccine (1 - Tdap) 12/05/2026 (Originally 8/30/1969)    Annual Wellness Visit (AWV)  05/25/2023    Colorectal Cancer Screen  12/04/2023    Flu vaccine  Completed    Pneumococcal 65+ years Vaccine  Completed    AAA screen  Completed    Hepatitis C

## 2023-08-02 RX ORDER — HYDROCHLOROTHIAZIDE 25 MG/1
TABLET ORAL
Qty: 90 TABLET | Refills: 1 | Status: SHIPPED | OUTPATIENT
Start: 2023-08-02

## 2023-08-10 ENCOUNTER — HOSPITAL ENCOUNTER (OUTPATIENT)
Age: 73
Discharge: HOME | End: 2023-08-10
Payer: COMMERCIAL

## 2023-08-10 ENCOUNTER — OFFICE VISIT (OUTPATIENT)
Dept: ONCOLOGY | Age: 73
End: 2023-08-10
Payer: COMMERCIAL

## 2023-08-10 ENCOUNTER — TELEPHONE (OUTPATIENT)
Dept: ONCOLOGY | Age: 73
End: 2023-08-10

## 2023-08-10 VITALS
WEIGHT: 180.8 LBS | BODY MASS INDEX: 30.09 KG/M2 | SYSTOLIC BLOOD PRESSURE: 163 MMHG | DIASTOLIC BLOOD PRESSURE: 83 MMHG | HEART RATE: 63 BPM | TEMPERATURE: 97.5 F

## 2023-08-10 DIAGNOSIS — C91.10 CHRONIC LARGE GRANULAR LYMPHOCYTIC LEUKEMIA (HCC): ICD-10-CM

## 2023-08-10 DIAGNOSIS — C84.00 MYCOSIS FUNGOIDES, UNSPECIFIED BODY REGION (HCC): ICD-10-CM

## 2023-08-10 DIAGNOSIS — D75.89 BONE MARROW HYPERPLASIA: Primary | ICD-10-CM

## 2023-08-10 LAB
ALBUMIN SERPL-MCNC: 3.7 G/DL (ref 3.5–5.2)
ALBUMIN/GLOB SERPL: 1.4 {RATIO} (ref 1–2.5)
ALP SERPL-CCNC: 100 U/L (ref 40–129)
ALT SERPL-CCNC: 22 U/L (ref 5–41)
ANION GAP SERPL CALCULATED.3IONS-SCNC: 6 MMOL/L (ref 9–17)
AST SERPL-CCNC: 17 U/L
BASOPHILS # BLD: 0 K/UL (ref 0–0.2)
BASOPHILS NFR BLD: 1 % (ref 0–2)
BILIRUB SERPL-MCNC: 0.3 MG/DL (ref 0.3–1.2)
BUN SERPL-MCNC: 15 MG/DL (ref 8–23)
CALCIUM SERPL-MCNC: 9.6 MG/DL (ref 8.6–10.4)
CHLORIDE SERPL-SCNC: 104 MMOL/L (ref 98–107)
CO2 SERPL-SCNC: 31 MMOL/L (ref 20–31)
CREAT SERPL-MCNC: 1 MG/DL (ref 0.7–1.2)
EOSINOPHIL # BLD: 0.1 K/UL (ref 0–0.4)
EOSINOPHILS RELATIVE PERCENT: 1 % (ref 1–4)
ERYTHROCYTE [DISTWIDTH] IN BLOOD BY AUTOMATED COUNT: 13.4 % (ref 12.5–15.4)
GFR SERPL CREATININE-BSD FRML MDRD: >60 ML/MIN/1.73M2
GLUCOSE SERPL-MCNC: 183 MG/DL (ref 70–99)
HCT VFR BLD AUTO: 40.5 % (ref 41–53)
HGB BLD-MCNC: 13.5 G/DL (ref 13.5–17.5)
LYMPHOCYTES NFR BLD: 0.9 K/UL (ref 1–4.8)
LYMPHOCYTES RELATIVE PERCENT: 13 % (ref 24–44)
MCH RBC QN AUTO: 31.7 PG (ref 26–34)
MCHC RBC AUTO-ENTMCNC: 33.3 G/DL (ref 31–37)
MCV RBC AUTO: 95.2 FL (ref 80–100)
MONOCYTES NFR BLD: 0.4 K/UL (ref 0.1–1.2)
MONOCYTES NFR BLD: 7 % (ref 2–11)
NEUTROPHILS NFR BLD: 78 % (ref 36–66)
NEUTS SEG NFR BLD: 5.1 K/UL (ref 1.8–7.7)
PLATELET # BLD AUTO: 152 K/UL (ref 140–450)
PMV BLD AUTO: 8.6 FL (ref 6–12)
POTASSIUM SERPL-SCNC: 3.7 MMOL/L (ref 3.7–5.3)
PROT SERPL-MCNC: 6.3 G/DL (ref 6.4–8.3)
RBC # BLD AUTO: 4.25 M/UL (ref 4.5–5.9)
SODIUM SERPL-SCNC: 141 MMOL/L (ref 135–144)
WBC OTHER # BLD: 6.5 K/UL (ref 3.5–11)

## 2023-08-10 PROCEDURE — 3077F SYST BP >= 140 MM HG: CPT | Performed by: INTERNAL MEDICINE

## 2023-08-10 PROCEDURE — 80053 COMPREHEN METABOLIC PANEL: CPT

## 2023-08-10 PROCEDURE — 99211 OFF/OP EST MAY X REQ PHY/QHP: CPT | Performed by: INTERNAL MEDICINE

## 2023-08-10 PROCEDURE — 85025 COMPLETE CBC W/AUTO DIFF WBC: CPT

## 2023-08-10 PROCEDURE — 99214 OFFICE O/P EST MOD 30 MIN: CPT | Performed by: INTERNAL MEDICINE

## 2023-08-10 PROCEDURE — 36415 COLL VENOUS BLD VENIPUNCTURE: CPT

## 2023-08-10 PROCEDURE — 3079F DIAST BP 80-89 MM HG: CPT | Performed by: INTERNAL MEDICINE

## 2023-08-10 PROCEDURE — 1123F ACP DISCUSS/DSCN MKR DOCD: CPT | Performed by: INTERNAL MEDICINE

## 2023-08-10 NOTE — TELEPHONE ENCOUNTER
AVS from 8/10/23      RTC in 6 months  with labs prior    Rv sched for 2/8/24 @ 8:00       Pt will have labs drawn one week prior to RV    Pt was given AVS and appointment schedule    Electronically signed by Danielle Hall on 8/10/2023 at 8:42 AM

## 2023-08-10 NOTE — PROGRESS NOTES
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 He reports that he was seen by his PCP in October 2013, for routine follow up visit. His Lab work at that time revealed low WBC 3.0K. So his lab work was repeated by his PCP in  Jan 2014. Which again revealed WBC 2.6 and ANC of 0.5, so he was referred to our hematology clinic for further evaluation and recommendations. His WBC have been stable for last few months, however his iron levels were mildly low so I started him on PO iron. INTERVAL HISTORY:  Alfonso Graver results and further recommendations. He is on methotrexate weekly and tolerating well. Discussion of right knee pain. He had injury to right knee about 2 months ago. Following with PCP. Denies any chest pain shortness of breath, abdominal pain nausea vomiting. During this visit patient's allergy, social, medical, surgical history and medications were reviewed and updated.       Current Outpatient Medications   Medication Sig Dispense Refill    hydroCHLOROthiazide (HYDRODIURIL) 25 MG tablet TAKE 1 TABLET DAILY 90 tablet 1    methotrexate (RHEUMATREX) 2.5 MG chemo tablet TAKE 8 TABLETS BY MOUTH ONCE A WEEK 96 tablet 3    atorvastatin (LIPITOR) 10 MG tablet TAKE 1 TABLET DAILY 90 tablet 1    levothyroxine (SYNTHROID) 125 MCG tablet Take 1 tablet by mouth Daily 90 tablet 1    omeprazole (PRILOSEC) 40 MG delayed release capsule TAKE 1 CAPSULE DAILY 90 capsule 1    OneTouch Delica Lancets 40C MISC 1 each by Does not apply route daily Test blood sugar daily 100 each 3    enalapril

## 2023-08-14 DIAGNOSIS — E03.9 HYPOTHYROIDISM, UNSPECIFIED TYPE: ICD-10-CM

## 2023-08-14 DIAGNOSIS — K21.00 GASTROESOPHAGEAL REFLUX DISEASE WITH ESOPHAGITIS: ICD-10-CM

## 2023-08-15 RX ORDER — LEVOTHYROXINE SODIUM 0.12 MG/1
TABLET ORAL
Qty: 90 TABLET | Refills: 1 | Status: SHIPPED | OUTPATIENT
Start: 2023-08-15

## 2023-08-15 RX ORDER — OMEPRAZOLE 40 MG/1
CAPSULE, DELAYED RELEASE ORAL
Qty: 90 CAPSULE | Refills: 1 | Status: SHIPPED | OUTPATIENT
Start: 2023-08-15

## 2023-08-15 RX ORDER — ATORVASTATIN CALCIUM 10 MG/1
TABLET, FILM COATED ORAL
Qty: 90 TABLET | Refills: 1 | Status: SHIPPED | OUTPATIENT
Start: 2023-08-15

## 2023-08-29 DIAGNOSIS — E11.9 TYPE 2 DIABETES MELLITUS WITHOUT COMPLICATION, WITHOUT LONG-TERM CURRENT USE OF INSULIN (HCC): ICD-10-CM

## 2023-08-30 RX ORDER — BLOOD SUGAR DIAGNOSTIC
STRIP MISCELLANEOUS
Qty: 100 EACH | Refills: 5 | Status: SHIPPED | OUTPATIENT
Start: 2023-08-30

## 2023-09-11 DIAGNOSIS — E11.9 TYPE 2 DIABETES MELLITUS WITHOUT COMPLICATION, WITHOUT LONG-TERM CURRENT USE OF INSULIN (HCC): ICD-10-CM

## 2023-09-12 ENCOUNTER — TELEPHONE (OUTPATIENT)
Dept: INTERNAL MEDICINE CLINIC | Age: 73
End: 2023-09-12

## 2023-09-12 ENCOUNTER — HOSPITAL ENCOUNTER (OUTPATIENT)
Age: 73
Setting detail: SPECIMEN
Discharge: HOME | End: 2023-09-12

## 2023-09-12 DIAGNOSIS — E03.9 HYPOTHYROIDISM, UNSPECIFIED TYPE: ICD-10-CM

## 2023-09-12 DIAGNOSIS — E11.9 TYPE 2 DIABETES MELLITUS WITHOUT COMPLICATION, WITHOUT LONG-TERM CURRENT USE OF INSULIN (HCC): ICD-10-CM

## 2023-09-12 DIAGNOSIS — C91.10 CHRONIC LARGE GRANULAR LYMPHOCYTIC LEUKEMIA (HCC): ICD-10-CM

## 2023-09-12 DIAGNOSIS — Z13.220 SCREENING FOR HYPERLIPIDEMIA: ICD-10-CM

## 2023-09-12 LAB
BASOPHILS # BLD: 0.06 K/UL (ref 0–0.2)
BASOPHILS NFR BLD: 0 % (ref 0–2)
CHOLEST SERPL-MCNC: 118 MG/DL (ref 0–199)
CHOLESTEROL/HDL RATIO: 2
CREAT UR-MCNC: 200.7 MG/DL (ref 39–259)
EOSINOPHIL # BLD: 0.06 K/UL (ref 0–0.44)
EOSINOPHILS RELATIVE PERCENT: 0 % (ref 1–4)
ERYTHROCYTE [DISTWIDTH] IN BLOOD BY AUTOMATED COUNT: 13.2 % (ref 11.8–14.4)
HCT VFR BLD AUTO: 44.8 % (ref 40.7–50.3)
HDLC SERPL-MCNC: 65 MG/DL (ref 0–40)
HGB BLD-MCNC: 14.3 G/DL (ref 13–17)
IMM GRANULOCYTES # BLD AUTO: 0.04 K/UL (ref 0–0.3)
IMM GRANULOCYTES NFR BLD: 0 %
LDLC SERPL CALC-MCNC: 43 MG/DL (ref 0–100)
LYMPHOCYTES NFR BLD: 1 K/UL (ref 1.1–3.7)
LYMPHOCYTES RELATIVE PERCENT: 7 % (ref 24–43)
MCH RBC QN AUTO: 31.3 PG (ref 25.2–33.5)
MCHC RBC AUTO-ENTMCNC: 31.9 G/DL (ref 28.4–34.8)
MCV RBC AUTO: 98 FL (ref 82.6–102.9)
MICROALBUMIN UR-MCNC: <12 MG/L
MICROALBUMIN/CREAT UR-RTO: NORMAL MCG/MG CREAT
MONOCYTES NFR BLD: 0.66 K/UL (ref 0.1–1.2)
MONOCYTES NFR BLD: 5 % (ref 3–12)
NEUTROPHILS NFR BLD: 88 % (ref 36–65)
NEUTS SEG NFR BLD: 11.87 K/UL (ref 1.5–8.1)
NRBC BLD-RTO: 0 PER 100 WBC
PLATELET # BLD AUTO: 244 K/UL (ref 138–453)
PMV BLD AUTO: 11.2 FL (ref 8.1–13.5)
RBC # BLD AUTO: 4.57 M/UL (ref 4.21–5.77)
TRIGL SERPL-MCNC: 54 MG/DL (ref 0–149)
TSH SERPL DL<=0.05 MIU/L-ACNC: 0.72 UIU/ML (ref 0.27–4.2)
VLDLC SERPL CALC-MCNC: 11 MG/DL
WBC OTHER # BLD: 13.7 K/UL (ref 3.5–11.3)

## 2023-09-12 RX ORDER — HYDROCHLOROTHIAZIDE 25 MG/1
25 TABLET ORAL DAILY
Qty: 180 TABLET | Refills: 5 | Status: SHIPPED | OUTPATIENT
Start: 2023-09-12

## 2023-09-12 RX ORDER — ENALAPRIL MALEATE 20 MG/1
20 TABLET ORAL 2 TIMES DAILY
Qty: 180 TABLET | Refills: 5 | Status: SHIPPED | OUTPATIENT
Start: 2023-09-12 | End: 2023-09-12 | Stop reason: SDUPTHER

## 2023-09-12 RX ORDER — ENALAPRIL MALEATE 20 MG/1
20 TABLET ORAL 2 TIMES DAILY
Qty: 180 TABLET | Refills: 5 | Status: SHIPPED | OUTPATIENT
Start: 2023-09-12

## 2023-09-12 NOTE — TELEPHONE ENCOUNTER
----- Message from Eleuterio Seip sent at 9/12/2023 10:30 AM EDT -----  Subject: Message to Provider    QUESTIONS  Information for Provider? wants this initial visit to be comprehensive. +   sore throat; right knee pain- xray referral?; colonoscopy- referral; flu   shot , & general check up.  ---------------------------------------------------------------------------  --------------  Miriam PALM  [phone]; OK to leave message on voicemail  ---------------------------------------------------------------------------  --------------  SCRIPT ANSWERS  Relationship to Patient?  Self

## 2023-09-12 NOTE — TELEPHONE ENCOUNTER
----- Message from Zulma Lomeli sent at 9/12/2023 10:36 AM EDT -----  Subject: Refill Request    QUESTIONS  Name of Medication? enalapril (VASOTEC) 20 MG tablet  Patient-reported dosage and instructions? 2 times daily  How many days do you have left? 4  Preferred Pharmacy? [facility] phone number (if available)? [phone]  ---------------------------------------------------------------------------  --------------,  Name of Medication? metFORMIN (GLUCOPHAGE) 500 MG tablet  Patient-reported dosage and instructions? twice daily  How many days do you have left? 0  Preferred Pharmacy? [facility] phone number (if available)? [phone]  Additional Information for Provider? wants 90 day supply with 2 refills   ---------------------------------------------------------------------------  --------------  CALL BACK INFO  What is the best way for the office to contact you? OK to leave message on   voicemail  Preferred Call Back Phone Number? [phone]  ---------------------------------------------------------------------------  --------------  SCRIPT ANSWERS  Relationship to Patient?  Self

## 2023-09-13 ENCOUNTER — HOSPITAL ENCOUNTER (OUTPATIENT)
Age: 73
Setting detail: SPECIMEN
Discharge: HOME | End: 2023-09-13

## 2023-09-13 ENCOUNTER — OFFICE VISIT (OUTPATIENT)
Dept: INTERNAL MEDICINE CLINIC | Age: 73
End: 2023-09-13

## 2023-09-13 ENCOUNTER — TELEPHONE (OUTPATIENT)
Dept: INTERNAL MEDICINE CLINIC | Age: 73
End: 2023-09-13

## 2023-09-13 VITALS
WEIGHT: 173 LBS | SYSTOLIC BLOOD PRESSURE: 122 MMHG | DIASTOLIC BLOOD PRESSURE: 78 MMHG | BODY MASS INDEX: 28.79 KG/M2 | OXYGEN SATURATION: 99 % | HEART RATE: 90 BPM

## 2023-09-13 DIAGNOSIS — E11.69 TYPE 2 DIABETES MELLITUS WITH OTHER SPECIFIED COMPLICATION, UNSPECIFIED WHETHER LONG TERM INSULIN USE (HCC): ICD-10-CM

## 2023-09-13 DIAGNOSIS — Z13.9 SCREENING DUE: ICD-10-CM

## 2023-09-13 DIAGNOSIS — M25.561 ACUTE PAIN OF RIGHT KNEE: ICD-10-CM

## 2023-09-13 DIAGNOSIS — J02.9 SORE THROAT: Primary | ICD-10-CM

## 2023-09-13 DIAGNOSIS — J02.9 SORE THROAT: ICD-10-CM

## 2023-09-13 DIAGNOSIS — D72.829 LEUKOCYTOSIS, UNSPECIFIED TYPE: ICD-10-CM

## 2023-09-13 LAB
BILIRUB UR QL STRIP: NEGATIVE
BILIRUBIN, POC: NORMAL
BLOOD URINE, POC: NORMAL
CASTS #/AREA URNS LPF: NORMAL /LPF (ref 0–8)
CLARITY UR: CLEAR
CLARITY, POC: CLEAR
COLOR UR: YELLOW
COLOR, POC: YELLOW
EPI CELLS #/AREA URNS HPF: NORMAL /HPF (ref 0–5)
EST. AVERAGE GLUCOSE BLD GHB EST-MCNC: 114 MG/DL
GLUCOSE UR STRIP-MCNC: NEGATIVE MG/DL
GLUCOSE URINE, POC: NORMAL
HBA1C MFR BLD: 5.6 % (ref 4–6)
HGB UR QL STRIP.AUTO: NEGATIVE
INFLUENZA A ANTIBODY: NEGATIVE
INFLUENZA B ANTIBODY: NEGATIVE
KETONES UR STRIP-MCNC: NEGATIVE MG/DL
KETONES, POC: NORMAL
LEUKOCYTE EST, POC: NORMAL
LEUKOCYTE ESTERASE UR QL STRIP: NEGATIVE
NITRITE UR QL STRIP: NEGATIVE
NITRITE, POC: NORMAL
PH UR STRIP: 7 [PH] (ref 5–8)
PH, POC: 6.5
PROT UR STRIP-MCNC: NEGATIVE MG/DL
PROTEIN, POC: NORMAL
RBC #/AREA URNS HPF: NORMAL /HPF (ref 0–4)
SP GR UR STRIP: 1.01 (ref 1–1.03)
SPECIFIC GRAVITY, POC: 1.01
UROBILINOGEN UR STRIP-ACNC: NORMAL EU/DL (ref 0–1)
UROBILINOGEN, POC: NORMAL
WBC #/AREA URNS HPF: NORMAL /HPF (ref 0–5)

## 2023-09-13 RX ORDER — ENALAPRIL MALEATE 5 MG/1
20 TABLET ORAL 2 TIMES DAILY
Qty: 30 TABLET | Refills: 0 | Status: SHIPPED | OUTPATIENT
Start: 2023-09-13

## 2023-09-13 RX ORDER — AMOXICILLIN 500 MG/1
500 CAPSULE ORAL 3 TIMES DAILY
Qty: 21 CAPSULE | Refills: 0 | Status: SHIPPED | OUTPATIENT
Start: 2023-09-13 | End: 2023-09-20

## 2023-09-13 RX ORDER — FAMOTIDINE 20 MG/1
20 TABLET, FILM COATED ORAL 2 TIMES DAILY
Qty: 180 TABLET | Refills: 1 | Status: SHIPPED | OUTPATIENT
Start: 2023-09-13

## 2023-09-13 NOTE — TELEPHONE ENCOUNTER
----- Message from Amilcar Tompkins sent at 9/13/2023 10:28 AM EDT -----  Subject: Message to Provider    QUESTIONS  Information for Provider? Pt would like to cancel the referral request to   Orthopedic Surgery and well as Ophthalmology  ---------------------------------------------------------------------------  --------------  [address]  [phone]; OK to leave message on voicemail  ---------------------------------------------------------------------------  --------------  SCRIPT ANSWERS  Relationship to Patient?  Self

## 2023-09-13 NOTE — TELEPHONE ENCOUNTER
Patient called the office and stated the [facility] is behind on his medications and he is not able to get ahold of them     I called [facility] and spoke to mague and she stated that the patients Vasotec and Metformin are in process and not scheduled to ship out until 09/19/2023.     I called patient back and informed him of the information and he stated that he will run out of medication before that and is requesting a short supply of medication to be sent to the [facility] on [address]

## 2023-09-13 NOTE — PROGRESS NOTES
Visit Information    Have you changed or started any medications since your last visit including any over-the-counter medicines, vitamins, or herbal medicines? no   Are you having any side effects from any of your medications? -  no  Have you stopped taking any of your medications? Is so, why? -  no    Have you seen any other physician or provider since your last visit? No  Have you had any other diagnostic tests since your last visit? No  Have you been seen in the emergency room and/or had an admission to a hospital since we last saw you? No  Have you had your routine dental cleaning in the past 6 months? no    Have you activated your [employer] account? If not, what are your barriers?  Yes     Patient Care Team:  Michael Contreras MD as PCP - General (Internal Medicine)  Elham Gonzales MD as PCP - Internal Medicine (Otolaryngology)  Deana Peacock MD as PCP - Empaneled Provider  Starr Garrett MD as Consulting Physician (Gastroenterology)  Yuan Clarke MD as Consulting Physician (Hematology and Oncology)  Karis Cruz RN as Registered Nurse (Oncology)    Medical History Review  Past Medical, Family, and Social History reviewed and does contribute to the patient presenting condition    Health Maintenance   Topic Date Due    Diabetic retinal exam  Never done    Shingles vaccine (1 of 2) Never done    Hepatitis B vaccine (1 of 3 - Risk 3-dose series) Never done    COVID-19 Vaccine (4 - Moderna risk series) 02/13/2022    A1C test (Diabetic or Prediabetic)  05/17/2023    Diabetic foot exam  05/24/2023    Annual Wellness Visit (AWV)  05/25/2023    Flu vaccine (1) 08/01/2023    DTaP/Tdap/Td vaccine (1 - Tdap) 12/05/2026 (Originally 8/30/1969)    Colorectal Cancer Screen  12/04/2023    Depression Screen  04/24/2024    GFR test (Diabetes, CKD 3-4, OR last GFR 15-59)  08/10/2024    Diabetic Alb to Cr ratio (uACR) test  09/12/2024    Lipids  09/12/2024    Pneumococcal 65+ years Vaccine  Completed    AAA screen
is normal. The neck is supple. Negative nodes. Respiratory: normal respiratory effort. Lungs are clear to auscultation bilaterally. no wheezing, no crackles. Cardiovascular: No jugular venous distention or carotid bruits. Normal S1 and S2 sounds are noted without murmurs, rubs, gallops. No leg edema  Abdomen: Soft to palpation in all four quadrants. There is no organomegaly and no rebound tenderness. Bowel sounds are normal.   Musculoskeletal: Right knee tender in the medial aspect, no obvious swelling noted. Range of motion limited secondary to pain however strength 5/5  Neurological: Normal [place] Coma Scale. Cranial nerves II through XII appear grossly intact. 5/5 in all 4 extremities, reflexes within normal limits, gait stable     Lab Results   Component Value Date    LABA1C 5.7 05/17/2022     Lab Results   Component Value Date     05/17/2022      LABORATORY FINDINGS:    CBC:  Lab Results   Component Value Date/Time    WBC 13.7 09/12/2023 08:15 AM    HGB 14.3 09/12/2023 08:15 AM     09/12/2023 08:15 AM       BMP:    Lab Results   Component Value Date/Time     09/12/2023 08:15 AM    K 4.1 09/12/2023 08:15 AM    CL 99 09/12/2023 08:15 AM    CO2 27 09/12/2023 08:15 AM    BUN 12 09/12/2023 08:15 AM    CREATININE 1.0 09/12/2023 08:15 AM    GLUCOSE 101 09/12/2023 08:15 AM       HEMOGLOBIN A1C:   Hemoglobin A1C (%)   Date Value   05/17/2022 5.7   06/03/2021 5.5   08/03/2020 5.9        FASTING LIPID PANEL:No results found for: \"LIPIDPAN\"    TSH:No results found for: \"TSHREFFT4\"    No results found for: \"TSHREFFT4\"     No valid procedures specified. ASSESSMENT AND PLAN:      Diagnoses and all orders for this visit:  Sore throat  -     POCT Influenza A/B  -     COVID-19; Future  Leukocytosis, unspecified type  -     POCT Urinalysis no Micro  -     XR CHEST STANDARD (2 VW); Future  Acute pain of right knee  -     XR KNEE RIGHT (3 VIEWS);  Future  -     Baljit Ontiveros MD, Orthopaedic

## 2023-09-13 NOTE — PATIENT INSTRUCTIONS
Go to ER if you have fever , temperature > 100.4  Take famotidine twice daily  Continue gargles for the sore throat  Use right knee brace, you can use warm and cold compresses

## 2023-09-14 LAB
ALBUMIN SERPL-MCNC: 3.7 G/DL (ref 3.5–5.2)
ALBUMIN/GLOB SERPL: 1 {RATIO}
ALP SERPL-CCNC: 84 U/L (ref 40–129)
ALT SERPL-CCNC: 19 U/L (ref 10–50)
ANION GAP SERPL CALCULATED.3IONS-SCNC: 12 MMOL/L (ref 9–16)
AST SERPL-CCNC: 25 U/L (ref 10–50)
BILIRUB SERPL-MCNC: 0.8 MG/DL (ref 0–1.2)
BUN SERPL-MCNC: 12 MG/DL (ref 8–23)
CALCIUM SERPL-MCNC: 9.6 MG/DL (ref 8.6–10.4)
CHLORIDE SERPL-SCNC: 99 MMOL/L (ref 98–107)
CO2 SERPL-SCNC: 27 MMOL/L (ref 20–31)
CREAT SERPL-MCNC: 1 MG/DL (ref 0.7–1.2)
GFR SERPL CREATININE-BSD FRML MDRD: >60 ML/MIN/1.73M2
GLUCOSE SERPL-MCNC: 101 MG/DL (ref 74–99)
POTASSIUM SERPL-SCNC: 4.1 MMOL/L (ref 3.7–5.3)
PROT SERPL-MCNC: 6.8 G/DL (ref 6.6–8.7)
SARS-COV-2 RNA RESP QL NAA+PROBE: NORMAL
SARS-COV-2 RNA RESP QL NAA+PROBE: NOT DETECTED
SODIUM SERPL-SCNC: 138 MMOL/L (ref 136–145)
SOURCE: NORMAL

## 2023-10-09 ENCOUNTER — TELEPHONE (OUTPATIENT)
Dept: GASTROENTEROLOGY | Age: 73
End: 2023-10-09

## 2023-10-09 NOTE — TELEPHONE ENCOUNTER
Patient James Mcdermott asking for return phone call to schedule with [address] [place] [address] for 3 yr recall.

## 2023-10-10 ENCOUNTER — OFFICE VISIT (OUTPATIENT)
Dept: FAMILY MEDICINE CLINIC | Age: 73
End: 2023-10-10
Payer: COMMERCIAL

## 2023-10-10 VITALS
TEMPERATURE: 97.9 F | WEIGHT: 173 LBS | DIASTOLIC BLOOD PRESSURE: 91 MMHG | HEART RATE: 89 BPM | BODY MASS INDEX: 28.82 KG/M2 | OXYGEN SATURATION: 99 % | SYSTOLIC BLOOD PRESSURE: 137 MMHG | HEIGHT: 65 IN

## 2023-10-10 DIAGNOSIS — J02.9 ACUTE PHARYNGITIS, UNSPECIFIED ETIOLOGY: Primary | ICD-10-CM

## 2023-10-10 DIAGNOSIS — K63.5 POLYP OF TRANSVERSE COLON, UNSPECIFIED TYPE: Primary | ICD-10-CM

## 2023-10-10 DIAGNOSIS — J02.9 SORE THROAT: ICD-10-CM

## 2023-10-10 LAB — S PYO AG THROAT QL: NORMAL

## 2023-10-10 PROCEDURE — 87880 STREP A ASSAY W/OPTIC: CPT

## 2023-10-10 PROCEDURE — 1123F ACP DISCUSS/DSCN MKR DOCD: CPT

## 2023-10-10 PROCEDURE — 3080F DIAST BP >= 90 MM HG: CPT

## 2023-10-10 PROCEDURE — 99213 OFFICE O/P EST LOW 20 MIN: CPT

## 2023-10-10 PROCEDURE — 3075F SYST BP GE 130 - 139MM HG: CPT

## 2023-10-10 RX ORDER — POLYETHYLENE GLYCOL 3350 17 G/17G
POWDER, FOR SOLUTION ORAL
Qty: 238 G | Refills: 0 | Status: SHIPPED | OUTPATIENT
Start: 2023-10-10

## 2023-10-10 RX ORDER — BISACODYL 5 MG/1
TABLET, DELAYED RELEASE ORAL
Qty: 4 TABLET | Refills: 0 | Status: SHIPPED | OUTPATIENT
Start: 2023-10-10

## 2023-10-10 ASSESSMENT — ENCOUNTER SYMPTOMS
COUGH: 1
EYE ITCHING: 0
SORE THROAT: 1
NAUSEA: 0
SWOLLEN GLANDS: 0
VOMITING: 0
ABDOMINAL PAIN: 0
EYE PAIN: 0

## 2023-10-10 NOTE — TELEPHONE ENCOUNTER
Patient has been contacted and scheduled accordingly with [facility] 11/1/23 @10am for colonoscopy. Bowel prep instructions provided to patient and sent to patients home.

## 2023-10-10 NOTE — PROGRESS NOTES
tablet Take 1 tablet by mouth 2 times daily 180 tablet 5    metFORMIN (GLUCOPHAGE) 500 MG tablet TAKE 1 TABLET TWICE DAILY  WITH MEALS 180 tablet 5    blood glucose test strips (ONETOUCH ULTRA) strip Test sugar once daily 100 each 5    levothyroxine (SYNTHROID) 125 MCG tablet TAKE 1 TABLET DAILY 90 tablet 1    atorvastatin (LIPITOR) 10 MG tablet TAKE 1 TABLET DAILY 90 tablet 1    methotrexate (RHEUMATREX) 2.5 MG chemo tablet TAKE 8 TABLETS BY MOUTH ONCE A WEEK 96 tablet 3    OneTouch Delica Lancets 93Z MISC 1 each by Does not apply route daily Test blood sugar daily 100 each 3    Handicap Placard MISC by Does not apply route Expires in 2023 1 each 0    famotidine (PEPCID) 20 MG tablet Take 1 tablet by mouth 2 times daily (Patient not taking: Reported on 10/10/2023) 180 tablet 1    enalapril (VASOTEC) 5 MG tablet Take 4 tablets by mouth 2 times daily (Patient not taking: Reported on 10/10/2023) 30 tablet 0    silver sulfADIAZINE (SILVADENE) 1 % cream Apply topically 1-2 times daily. (Patient not taking: Reported on 4/24/2023) 25 g 0    vitamin D3 (CHOLECALCIFEROL) 25 MCG (1000 UT) TABS tablet Take 1 tablet by mouth in the morning. (Patient not taking: Reported on 11/26/2022) 30 tablet 5    sildenafil (VIAGRA) 50 MG tablet Take 1-2 tablets by mouth daily as needed for Erectile Dysfunction (Patient not taking: Reported on 10/10/2023) 30 tablet 0     No current facility-administered medications for this visit. Allergies   Allergen Reactions    Morphine Other (See Comments)     Veins in arms turned blue       Subjective:      Review of Systems   Constitutional:  Negative for activity change, appetite change, chills, fatigue and fever. HENT:  Positive for congestion and sore throat. Eyes:  Negative for pain and itching. Respiratory:  Positive for cough. Cardiovascular:  Negative for chest pain. Gastrointestinal:  Negative for abdominal pain, nausea and vomiting.    Musculoskeletal:  Negative for

## 2023-10-18 ENCOUNTER — HOSPITAL ENCOUNTER (OUTPATIENT)
Dept: PREADMISSION TESTING | Age: 73
Discharge: HOME | End: 2023-10-22

## 2023-10-18 VITALS — BODY MASS INDEX: 28.82 KG/M2 | HEIGHT: 65 IN | WEIGHT: 173 LBS

## 2023-10-18 DIAGNOSIS — K63.5 POLYP OF TRANSVERSE COLON, UNSPECIFIED TYPE: ICD-10-CM

## 2023-10-18 NOTE — PROGRESS NOTES
Pre-op Instructions For Out-Patient Endoscopy Surgery    Medication Instructions:  Please stop herbs and any supplements now (includes vitamins and minerals). Please contact your surgeon and prescribing physician for pre-op instructions for any blood thinners. If you have inhalers/aerosol treatments at home, please use them the morning of your surgery and bring the inhalers with you to the hospital.    Please take the following medications the morning of your surgery with a sip of water:    levothyroxine    Surgery Instructions:  After midnight before surgery:  Do not eat or drink anything, including water, mints, gum, and hard candy. You may brush your teeth without swallowing. No smoking, chewing tobacco, or street drugs. Please shower or bathe before surgery. Please do not wear any cologne, lotion, powder, jewelry, piercings, perfume, makeup, nail polish, hair accessories, or hair spray on the day of surgery. Wear loose comfortable clothing. Leave your valuables at home but bring a payment source for any after-surgery prescriptions you plan to fill at [facility]. Bring a storage case for any glasses/contacts. An adult who is responsible for you MUST drive you home and should be with you for the first 24 hours after surgery. The Day of Surgery:  Arrive at [facility] Surgery Entrance at the time directed by your surgeon and check in at the desk. If you have a living will or healthcare power of , please bring a copy. You will be taken to the pre-op holding area where you will be prepared for surgery. A physical assessment will be performed by a nurse practitioner or house officer. Your IV will be started and you will meet your anesthesiologist.    When you go to surgery, your family will be directed to the surgical waiting room, where the doctor should speak with them after your surgery. After surgery, you will be taken to the recovery area.

## 2023-10-18 NOTE — TELEPHONE ENCOUNTER
[facility] and he wants the order sent to [facility] in [facility].

## 2023-10-20 RX ORDER — BISACODYL 5 MG/1
TABLET, DELAYED RELEASE ORAL
Qty: 4 TABLET | Refills: 0 | Status: SHIPPED | OUTPATIENT
Start: 2023-10-20

## 2023-10-20 RX ORDER — POLYETHYLENE GLYCOL 3350 17 G/17G
POWDER, FOR SOLUTION ORAL
Qty: 238 G | Refills: 0 | Status: SHIPPED | OUTPATIENT
Start: 2023-10-20

## 2023-10-30 DIAGNOSIS — N52.9 ERECTILE DYSFUNCTION, UNSPECIFIED ERECTILE DYSFUNCTION TYPE: ICD-10-CM

## 2023-10-30 RX ORDER — SILDENAFIL 50 MG/1
50 TABLET, FILM COATED ORAL PRN
Qty: 6 TABLET | Refills: 0 | Status: SHIPPED | OUTPATIENT
Start: 2023-10-30

## 2023-10-30 NOTE — PRE-PROCEDURE INSTRUCTIONS
Have you received your Prep? Any questions with prep instructions? Y  Only Clear Liquid Diet day before. Y  Nothing to eat after midnight day before procedure. Y  Are you taking any blood thinners? If so, you need to Stop. N  Remove any jewelry and body piercings. Y  Do you wear glasses? If so, please bring a case to store them in. Are you having any Covid symptoms? N  Do you have any new rashes, infections, etc. that we should be aware of?N  Do you have a ride home the day of surgery? It cannot be a cab or medical transportation. Y  Verify surgery time/date and what time to arrive at hospital.0800

## 2023-10-31 ENCOUNTER — ANESTHESIA EVENT (OUTPATIENT)
Dept: ENDOSCOPY | Age: 73
End: 2023-10-31
Payer: COMMERCIAL

## 2023-11-01 ENCOUNTER — HOSPITAL ENCOUNTER (OUTPATIENT)
Age: 73
Setting detail: OUTPATIENT SURGERY
Discharge: HOME | End: 2023-11-01
Attending: INTERNAL MEDICINE | Admitting: INTERNAL MEDICINE
Payer: COMMERCIAL

## 2023-11-01 ENCOUNTER — ANESTHESIA (OUTPATIENT)
Dept: ENDOSCOPY | Age: 73
End: 2023-11-01
Payer: COMMERCIAL

## 2023-11-01 VITALS
OXYGEN SATURATION: 100 % | TEMPERATURE: 97.5 F | DIASTOLIC BLOOD PRESSURE: 82 MMHG | RESPIRATION RATE: 25 BRPM | HEIGHT: 65 IN | SYSTOLIC BLOOD PRESSURE: 128 MMHG | BODY MASS INDEX: 28.82 KG/M2 | HEART RATE: 82 BPM | WEIGHT: 173 LBS

## 2023-11-01 DIAGNOSIS — Z86.010 HISTORY OF COLON POLYPS: ICD-10-CM

## 2023-11-01 LAB — GLUCOSE BLD-MCNC: 116 MG/DL (ref 75–110)

## 2023-11-01 PROCEDURE — 2580000003 HC RX 258: Performed by: ANESTHESIOLOGY

## 2023-11-01 PROCEDURE — 2500000003 HC RX 250 WO HCPCS: Performed by: NURSE ANESTHETIST, CERTIFIED REGISTERED

## 2023-11-01 PROCEDURE — 7100000011 HC PHASE II RECOVERY - ADDTL 15 MIN: Performed by: INTERNAL MEDICINE

## 2023-11-01 PROCEDURE — 7100000010 HC PHASE II RECOVERY - FIRST 15 MIN: Performed by: INTERNAL MEDICINE

## 2023-11-01 PROCEDURE — 3609010300 HC COLONOSCOPY W/BIOPSY SINGLE/MULTIPLE: Performed by: INTERNAL MEDICINE

## 2023-11-01 PROCEDURE — 88305 TISSUE EXAM BY PATHOLOGIST: CPT

## 2023-11-01 PROCEDURE — 82947 ASSAY GLUCOSE BLOOD QUANT: CPT

## 2023-11-01 PROCEDURE — 3700000001 HC ADD 15 MINUTES (ANESTHESIA): Performed by: INTERNAL MEDICINE

## 2023-11-01 PROCEDURE — 3700000000 HC ANESTHESIA ATTENDED CARE: Performed by: INTERNAL MEDICINE

## 2023-11-01 PROCEDURE — 2709999900 HC NON-CHARGEABLE SUPPLY: Performed by: INTERNAL MEDICINE

## 2023-11-01 PROCEDURE — 45385 COLONOSCOPY W/LESION REMOVAL: CPT | Performed by: INTERNAL MEDICINE

## 2023-11-01 PROCEDURE — 6360000002 HC RX W HCPCS: Performed by: NURSE ANESTHETIST, CERTIFIED REGISTERED

## 2023-11-01 RX ORDER — SODIUM CHLORIDE 9 MG/ML
INJECTION, SOLUTION INTRAVENOUS CONTINUOUS
Status: DISCONTINUED | OUTPATIENT
Start: 2023-11-01 | End: 2023-11-01 | Stop reason: HOSPADM

## 2023-11-01 RX ORDER — PROPOFOL 10 MG/ML
INJECTION, EMULSION INTRAVENOUS PRN
Status: DISCONTINUED | OUTPATIENT
Start: 2023-11-01 | End: 2023-11-01 | Stop reason: SDUPTHER

## 2023-11-01 RX ORDER — LIDOCAINE HYDROCHLORIDE 10 MG/ML
1 INJECTION, SOLUTION EPIDURAL; INFILTRATION; INTRACAUDAL; PERINEURAL
Status: DISCONTINUED | OUTPATIENT
Start: 2023-11-01 | End: 2023-11-01 | Stop reason: HOSPADM

## 2023-11-01 RX ORDER — SIMETHICONE 20 MG/.3ML
EMULSION ORAL PRN
Status: DISCONTINUED | OUTPATIENT
Start: 2023-11-01 | End: 2023-11-01 | Stop reason: ALTCHOICE

## 2023-11-01 RX ORDER — SODIUM CHLORIDE 0.9 % (FLUSH) 0.9 %
5-40 SYRINGE (ML) INJECTION PRN
Status: DISCONTINUED | OUTPATIENT
Start: 2023-11-01 | End: 2023-11-01 | Stop reason: HOSPADM

## 2023-11-01 RX ORDER — SODIUM CHLORIDE 9 MG/ML
INJECTION, SOLUTION INTRAVENOUS PRN
Status: DISCONTINUED | OUTPATIENT
Start: 2023-11-01 | End: 2023-11-01 | Stop reason: HOSPADM

## 2023-11-01 RX ORDER — SODIUM CHLORIDE 0.9 % (FLUSH) 0.9 %
5-40 SYRINGE (ML) INJECTION EVERY 12 HOURS SCHEDULED
Status: DISCONTINUED | OUTPATIENT
Start: 2023-11-01 | End: 2023-11-01 | Stop reason: HOSPADM

## 2023-11-01 RX ORDER — LIDOCAINE HYDROCHLORIDE 20 MG/ML
INJECTION, SOLUTION EPIDURAL; INFILTRATION; INTRACAUDAL; PERINEURAL PRN
Status: DISCONTINUED | OUTPATIENT
Start: 2023-11-01 | End: 2023-11-01 | Stop reason: SDUPTHER

## 2023-11-01 RX ADMIN — SODIUM CHLORIDE: 9 INJECTION, SOLUTION INTRAVENOUS at 08:55

## 2023-11-01 RX ADMIN — PROPOFOL 370 MG: 10 INJECTION, EMULSION INTRAVENOUS at 10:10

## 2023-11-01 RX ADMIN — LIDOCAINE HYDROCHLORIDE 40 MG: 20 INJECTION, SOLUTION EPIDURAL; INFILTRATION; INTRACAUDAL; PERINEURAL at 10:10

## 2023-11-01 ASSESSMENT — ENCOUNTER SYMPTOMS
GASTROINTESTINAL NEGATIVE: 1
BACK PAIN: 0
RESPIRATORY NEGATIVE: 1

## 2023-11-01 ASSESSMENT — PAIN - FUNCTIONAL ASSESSMENT: PAIN_FUNCTIONAL_ASSESSMENT: 0-10

## 2023-11-01 NOTE — OP NOTE
COLONOSCOPY    DATE OF PROCEDURE: 11/1/2023    SURGEON: Olga Riley MD    ASSISTANT: None    PREOPERATIVE DIAGNOSIS: History of colon polyps. Procedure performed to evaluate residual polyps, recurrent polyps    POSTOPERATIVE DIAGNOSIS: Questionable polyp ileocecal valve. Redundant colon. OPERATION: Total colonoscopy, snare polypectomy    ANESTHESIA: MAC    ESTIMATED BLOOD LOSS: None    COMPLICATIONS: None     SPECIMENS:  Was Obtained: Polyp ileocecal valve    HISTORY: The patient is a 68y.o. year old male with history of above preop diagnosis. I recommended colonoscopy with possible biopsy or polypectomy and I explained the risk, benefits, expected outcome, and alternatives to the procedure. Risks included but are not limited to bleeding, infection, respiratory distress, hypotension, and perforation of the colon and possibility of missing a lesion. The patient understands and is in agreement. PROCEDURE:  The patient's SPO2 remained above 90% throughout the procedure. Digital rectal exam was normal.  The colonoscope was inserted through the anus into the rectum and advanced under direct vision to the cecum without difficulty. Terminal ileum was examined for approximately 2 inches. The prep was adequate. Findings:  Terminal ileum: normal    Cecum/Ascending colon: abnormal: Ileocecal valve was prominent. Lower lip of the ileocecal valve had a polypoid configuration. There was a question whether this is adenoma. This polypoid lesion removed with the snare and cautery technique. Sharp angles, redundant right colon. Transverse colon: normal, no residual polyp or recurrent polyp seen from the previous polypectomy site in the transverse colon    Descending/Sigmoid colon: normal    Rectum/Anus: examined in normal and retroflexed positions and was normal    Withdrawal Time was (minutes): 15    Redundant colon limiting the accuracy of the examination.       The colon was decompressed and the scope was removed. The patient tolerated the procedure without unusual events. During the procedure, the patient's blood pressure, pulse and oxygen saturation remained stable and documented. No unusual events occurred during the procedure. Patient was transferred to recovery room and will be discharged when criteria is met. Recommendations/Plan:   F/U Biopsies  F/U In Office as instructed  Discussed with the family  High fiber diet   Precautions to avoid constipation     Next colonoscopy: 3-5 years. If Colonoscopy is less than 10 years the recommended reason is due:polyps, redundant colon.     Electronically signed by Kayla Carvajal MD  on 11/1/2023 at 10:45 AM

## 2023-11-03 ENCOUNTER — OFFICE VISIT (OUTPATIENT)
Dept: GASTROENTEROLOGY | Age: 73
End: 2023-11-03
Payer: COMMERCIAL

## 2023-11-03 ENCOUNTER — HOSPITAL ENCOUNTER (OUTPATIENT)
Age: 73
Discharge: HOME | End: 2023-11-03
Payer: COMMERCIAL

## 2023-11-03 VITALS
HEART RATE: 74 BPM | BODY MASS INDEX: 27.39 KG/M2 | DIASTOLIC BLOOD PRESSURE: 74 MMHG | SYSTOLIC BLOOD PRESSURE: 121 MMHG | HEIGHT: 65 IN | WEIGHT: 164.4 LBS

## 2023-11-03 DIAGNOSIS — K62.5 HEMORRHAGE OF RECTUM AND ANUS: Primary | ICD-10-CM

## 2023-11-03 DIAGNOSIS — K62.5 HEMORRHAGE OF RECTUM AND ANUS: ICD-10-CM

## 2023-11-03 LAB
ERYTHROCYTE [DISTWIDTH] IN BLOOD BY AUTOMATED COUNT: 13.7 % (ref 11.5–14.9)
HCT VFR BLD AUTO: 40.2 % (ref 41–53)
HGB BLD-MCNC: 13.2 G/DL (ref 13.5–17.5)
MCH RBC QN AUTO: 30.7 PG (ref 26–34)
MCHC RBC AUTO-ENTMCNC: 32.9 G/DL (ref 31–37)
MCV RBC AUTO: 93.3 FL (ref 80–100)
PLATELET # BLD AUTO: 259 K/UL (ref 150–450)
PMV BLD AUTO: 8.3 FL (ref 6–12)
RBC # BLD AUTO: 4.31 M/UL (ref 4.5–5.9)
WBC OTHER # BLD: 9 K/UL (ref 3.5–11)

## 2023-11-03 PROCEDURE — 3074F SYST BP LT 130 MM HG: CPT | Performed by: INTERNAL MEDICINE

## 2023-11-03 PROCEDURE — 1123F ACP DISCUSS/DSCN MKR DOCD: CPT | Performed by: INTERNAL MEDICINE

## 2023-11-03 PROCEDURE — 36415 COLL VENOUS BLD VENIPUNCTURE: CPT

## 2023-11-03 PROCEDURE — 3078F DIAST BP <80 MM HG: CPT | Performed by: INTERNAL MEDICINE

## 2023-11-03 PROCEDURE — 99213 OFFICE O/P EST LOW 20 MIN: CPT | Performed by: INTERNAL MEDICINE

## 2023-11-03 PROCEDURE — 85027 COMPLETE CBC AUTOMATED: CPT

## 2023-11-03 RX ORDER — OMEPRAZOLE 40 MG/1
CAPSULE, DELAYED RELEASE ORAL
COMMUNITY
Start: 2023-10-22

## 2023-11-03 ASSESSMENT — ENCOUNTER SYMPTOMS
NAUSEA: 0
RECTAL PAIN: 0
BACK PAIN: 0
TROUBLE SWALLOWING: 0
BLOOD IN STOOL: 0
SINUS PRESSURE: 0
DIARRHEA: 0
ABDOMINAL DISTENTION: 0
VOICE CHANGE: 0
VOMITING: 0
CHOKING: 0
COUGH: 0
SORE THROAT: 0
WHEEZING: 0
CONSTIPATION: 0
ANAL BLEEDING: 0
ABDOMINAL PAIN: 0

## 2023-11-03 NOTE — PROGRESS NOTES
GI OFFICE FOLLOW UP    INTERVAL HISTORY:   No referring provider defined for this encounter. Chief Complaint   Patient presents with    Follow Up After Procedure     Patient is here today to be seen for a follow up from his colonoscopy. Patient has been experiencing dark stools. 1. Hemorrhage of rectum and anus              HISTORY OF PRESENT ILLNESS:   Patient seen along with his wife. He has a history of questionable dark stools yesterday. Yesterday morning he had a couple of liquid dark stools. He thought that he ate excessive chocolate the night before which might have caused the dark stools. Since yesterday morning he had couple of smears of bowel movements. No gross hematochezia. Denies abdominal pain bloating, nausea vomiting. No weakness, tiredness dizziness. No fever or chills. this patient had a colonoscopy and polypectomy done on 11/1/2023 morning. Yesterday when I was contacted regarding dark stools, I did advised the patient to come to the office, and he could not come. Also I did contact the patient and the patient's wife last evening, if he has any episode of dark stools, signs of bleeding to bring him to the emergency room. As he did not have any more of bowel movements, he stayed home today and came to the office this evening. Past Medical,Family, and Social History reviewed and does contribute to the patient presenting condition. Patient's PMH/PSH,SH,PSYCH Hx, MEDs, ALLERGIES, and ROS were all reviewed and updated in the appropriate sections.  Yes      PAST MEDICAL HISTORY:  Past Medical History:   Diagnosis Date    Arthritis     Cancer [facility]     leukemia    Colon poly[place] 07[place]03/2017    [place] [facility]    History of colon polyps 2014, 2017    Hypothyroidism 11/2/2012    Iron deficiency     Neutropenia (HCC)     Osteoarthritis

## 2023-11-17 LAB — SURGICAL PATHOLOGY REPORT: NORMAL

## 2023-11-22 DIAGNOSIS — Z86.010 HISTORY OF COLON POLYPS: Primary | ICD-10-CM

## 2023-12-27 DIAGNOSIS — N52.9 ERECTILE DYSFUNCTION, UNSPECIFIED ERECTILE DYSFUNCTION TYPE: ICD-10-CM

## 2023-12-27 RX ORDER — SILDENAFIL 50 MG/1
50 TABLET, FILM COATED ORAL PRN
Qty: 6 TABLET | Refills: 0 | Status: SHIPPED | OUTPATIENT
Start: 2023-12-27

## 2024-01-15 DIAGNOSIS — C91.10 CHRONIC LARGE GRANULAR LYMPHOCYTIC LEUKEMIA (HCC): Primary | ICD-10-CM

## 2024-01-15 DIAGNOSIS — C91.10 CHRONIC LARGE GRANULAR LYMPHOCYTIC LEUKEMIA (HCC): ICD-10-CM

## 2024-01-15 RX ORDER — METHOTREXATE 2.5 MG/1
TABLET ORAL
Qty: 96 TABLET | Refills: 3 | Status: SHIPPED | OUTPATIENT
Start: 2024-01-15

## 2024-01-24 ENCOUNTER — TELEPHONE (OUTPATIENT)
Dept: INFUSION THERAPY | Age: 74
End: 2024-01-24

## 2024-01-24 ENCOUNTER — APPOINTMENT (OUTPATIENT)
Dept: CT IMAGING | Age: 74
End: 2024-01-24
Payer: COMMERCIAL

## 2024-01-24 ENCOUNTER — TELEPHONE (OUTPATIENT)
Dept: ORTHOPEDIC SURGERY | Age: 74
End: 2024-01-24

## 2024-01-24 ENCOUNTER — APPOINTMENT (OUTPATIENT)
Dept: GENERAL RADIOLOGY | Age: 74
End: 2024-01-24
Payer: COMMERCIAL

## 2024-01-24 ENCOUNTER — HOSPITAL ENCOUNTER (EMERGENCY)
Age: 74
Discharge: HOME | End: 2024-01-24
Attending: STUDENT IN AN ORGANIZED HEALTH CARE EDUCATION/TRAINING PROGRAM
Payer: COMMERCIAL

## 2024-01-24 VITALS
HEART RATE: 99 BPM | TEMPERATURE: 97.8 F | WEIGHT: 165 LBS | SYSTOLIC BLOOD PRESSURE: 122 MMHG | DIASTOLIC BLOOD PRESSURE: 81 MMHG | RESPIRATION RATE: 17 BRPM | BODY MASS INDEX: 27.49 KG/M2 | OXYGEN SATURATION: 96 % | HEIGHT: 65 IN

## 2024-01-24 DIAGNOSIS — M25.511 RIGHT SHOULDER PAIN, UNSPECIFIED CHRONICITY: Primary | ICD-10-CM

## 2024-01-24 DIAGNOSIS — C91.10 CHRONIC LARGE GRANULAR LYMPHOCYTIC LEUKEMIA (HCC): ICD-10-CM

## 2024-01-24 DIAGNOSIS — S42.291A HUMERAL HEAD FRACTURE, RIGHT, CLOSED, INITIAL ENCOUNTER: Primary | ICD-10-CM

## 2024-01-24 LAB
ANION GAP SERPL CALCULATED.3IONS-SCNC: 11 MMOL/L (ref 9–17)
BUN SERPL-MCNC: 19 MG/DL (ref 8–23)
CALCIUM SERPL-MCNC: 9.8 MG/DL (ref 8.6–10.4)
CHLORIDE SERPL-SCNC: 97 MMOL/L (ref 98–107)
CO2 SERPL-SCNC: 26 MMOL/L (ref 20–31)
CREAT SERPL-MCNC: 1.2 MG/DL (ref 0.7–1.2)
ERYTHROCYTE [DISTWIDTH] IN BLOOD BY AUTOMATED COUNT: 14 % (ref 11.5–14.9)
GFR SERPL CREATININE-BSD FRML MDRD: >60 ML/MIN/1.73M2
GLUCOSE SERPL-MCNC: 194 MG/DL (ref 70–99)
HCT VFR BLD AUTO: 42.5 % (ref 41–53)
HGB BLD-MCNC: 14.6 G/DL (ref 13.5–17.5)
MCH RBC QN AUTO: 32.2 PG (ref 26–34)
MCHC RBC AUTO-ENTMCNC: 34.3 G/DL (ref 31–37)
MCV RBC AUTO: 93.9 FL (ref 80–100)
PLATELET # BLD AUTO: 282 K/UL (ref 150–450)
PMV BLD AUTO: 8.6 FL (ref 6–12)
POTASSIUM SERPL-SCNC: 3.8 MMOL/L (ref 3.7–5.3)
RBC # BLD AUTO: 4.53 M/UL (ref 4.5–5.9)
SODIUM SERPL-SCNC: 134 MMOL/L (ref 135–144)
WBC OTHER # BLD: 12.1 K/UL (ref 3.5–11)

## 2024-01-24 PROCEDURE — 6360000002 HC RX W HCPCS: Performed by: STUDENT IN AN ORGANIZED HEALTH CARE EDUCATION/TRAINING PROGRAM

## 2024-01-24 PROCEDURE — 85027 COMPLETE CBC AUTOMATED: CPT

## 2024-01-24 PROCEDURE — 36415 COLL VENOUS BLD VENIPUNCTURE: CPT

## 2024-01-24 PROCEDURE — 73060 X-RAY EXAM OF HUMERUS: CPT

## 2024-01-24 PROCEDURE — 6360000004 HC RX CONTRAST MEDICATION: Performed by: STUDENT IN AN ORGANIZED HEALTH CARE EDUCATION/TRAINING PROGRAM

## 2024-01-24 PROCEDURE — 2580000003 HC RX 258: Performed by: STUDENT IN AN ORGANIZED HEALTH CARE EDUCATION/TRAINING PROGRAM

## 2024-01-24 PROCEDURE — 96374 THER/PROPH/DIAG INJ IV PUSH: CPT

## 2024-01-24 PROCEDURE — 99285 EMERGENCY DEPT VISIT HI MDM: CPT

## 2024-01-24 PROCEDURE — 96375 TX/PRO/DX INJ NEW DRUG ADDON: CPT

## 2024-01-24 PROCEDURE — 73206 CT ANGIO UPR EXTRM W/O&W/DYE: CPT

## 2024-01-24 PROCEDURE — 80048 BASIC METABOLIC PNL TOTAL CA: CPT

## 2024-01-24 PROCEDURE — 73030 X-RAY EXAM OF SHOULDER: CPT

## 2024-01-24 RX ORDER — 0.9 % SODIUM CHLORIDE 0.9 %
100 INTRAVENOUS SOLUTION INTRAVENOUS ONCE
Status: COMPLETED | OUTPATIENT
Start: 2024-01-24 | End: 2024-01-24

## 2024-01-24 RX ORDER — ACETAMINOPHEN 500 MG
1000 TABLET ORAL 3 TIMES DAILY
Qty: 42 TABLET | Refills: 0 | Status: SHIPPED | OUTPATIENT
Start: 2024-01-24 | End: 2024-01-31

## 2024-01-24 RX ORDER — IBUPROFEN 600 MG/1
600 TABLET ORAL EVERY 8 HOURS PRN
Qty: 15 TABLET | Refills: 0 | Status: SHIPPED | OUTPATIENT
Start: 2024-01-24 | End: 2024-01-29

## 2024-01-24 RX ORDER — METHOCARBAMOL 750 MG/1
750 TABLET, FILM COATED ORAL 3 TIMES DAILY PRN
Qty: 30 TABLET | Refills: 0 | Status: SHIPPED | OUTPATIENT
Start: 2024-01-24 | End: 2024-02-03

## 2024-01-24 RX ORDER — OXYCODONE HYDROCHLORIDE 5 MG/1
10 TABLET ORAL EVERY 6 HOURS PRN
Qty: 24 TABLET | Refills: 0 | Status: SHIPPED | OUTPATIENT
Start: 2024-01-24 | End: 2024-01-27

## 2024-01-24 RX ORDER — METHOTREXATE 2.5 MG/1
TABLET ORAL
Qty: 96 TABLET | Refills: 3 | Status: SHIPPED | OUTPATIENT
Start: 2024-01-24

## 2024-01-24 RX ORDER — ONDANSETRON 4 MG/1
4 TABLET, FILM COATED ORAL EVERY 8 HOURS PRN
Qty: 20 TABLET | Refills: 0 | Status: SHIPPED | OUTPATIENT
Start: 2024-01-24

## 2024-01-24 RX ORDER — SODIUM CHLORIDE 0.9 % (FLUSH) 0.9 %
10 SYRINGE (ML) INJECTION PRN
Status: DISCONTINUED | OUTPATIENT
Start: 2024-01-24 | End: 2024-01-24 | Stop reason: HOSPADM

## 2024-01-24 RX ORDER — FENTANYL CITRATE 50 UG/ML
100 INJECTION, SOLUTION INTRAMUSCULAR; INTRAVENOUS ONCE
Status: COMPLETED | OUTPATIENT
Start: 2024-01-24 | End: 2024-01-24

## 2024-01-24 RX ORDER — KETOROLAC TROMETHAMINE 30 MG/ML
15 INJECTION, SOLUTION INTRAMUSCULAR; INTRAVENOUS ONCE
Status: COMPLETED | OUTPATIENT
Start: 2024-01-24 | End: 2024-01-24

## 2024-01-24 RX ADMIN — KETOROLAC TROMETHAMINE 15 MG: 30 INJECTION INTRAMUSCULAR; INTRAVENOUS at 08:33

## 2024-01-24 RX ADMIN — SODIUM CHLORIDE 100 ML: 9 INJECTION, SOLUTION INTRAVENOUS at 09:21

## 2024-01-24 RX ADMIN — IOPAMIDOL 75 ML: 755 INJECTION, SOLUTION INTRAVENOUS at 09:21

## 2024-01-24 RX ADMIN — FENTANYL CITRATE 100 MCG: 50 INJECTION INTRAMUSCULAR; INTRAVENOUS at 07:48

## 2024-01-24 RX ADMIN — HYDROMORPHONE HYDROCHLORIDE 0.5 MG: 1 INJECTION, SOLUTION INTRAMUSCULAR; INTRAVENOUS; SUBCUTANEOUS at 08:33

## 2024-01-24 RX ADMIN — SODIUM CHLORIDE, PRESERVATIVE FREE 10 ML: 5 INJECTION INTRAVENOUS at 09:21

## 2024-01-24 ASSESSMENT — LIFESTYLE VARIABLES
HOW OFTEN DO YOU HAVE A DRINK CONTAINING ALCOHOL: NEVER
HOW MANY STANDARD DRINKS CONTAINING ALCOHOL DO YOU HAVE ON A TYPICAL DAY: PATIENT DOES NOT DRINK

## 2024-01-24 ASSESSMENT — ENCOUNTER SYMPTOMS
NAUSEA: 0
SHORTNESS OF BREATH: 0
TROUBLE SWALLOWING: 0
VOMITING: 0
COUGH: 0
BACK PAIN: 0
ABDOMINAL PAIN: 0

## 2024-01-24 ASSESSMENT — PAIN SCALES - GENERAL
PAINLEVEL_OUTOF10: 10
PAINLEVEL_OUTOF10: 10

## 2024-01-24 ASSESSMENT — PAIN - FUNCTIONAL ASSESSMENT: PAIN_FUNCTIONAL_ASSESSMENT: 0-10

## 2024-01-24 ASSESSMENT — PAIN DESCRIPTION - LOCATION
LOCATION: ARM
LOCATION: ARM

## 2024-01-24 ASSESSMENT — PAIN DESCRIPTION - DESCRIPTORS
DESCRIPTORS: SHARP
DESCRIPTORS: SHARP

## 2024-01-24 ASSESSMENT — PAIN DESCRIPTION - ORIENTATION
ORIENTATION: RIGHT
ORIENTATION: RIGHT;UPPER

## 2024-01-24 NOTE — TELEPHONE ENCOUNTER
Patient's wife called office back and I discussed with her Dr. Armstrong's recommendations. She stated that the patient was amendable to Dr. Armstrong's plan and an appt with Dr. Armstrong was scheduled for 1/29 at 4:45pm. Wife was instructed to have patient arrive at 4:30pm. I also informed her of Dr. Armstrong's request for medical clearance from PCP. They were going to contact PCP office in order to see about medical clearance.

## 2024-01-24 NOTE — ED TRIAGE NOTES
Mode of arrival (squad #, walk in, police, etc) : Walk in        Chief complaint(s): Fall, shoulder pain        Arrival Note (brief scenario, treatment PTA, etc).: Pt arrives to ED c/o right arm and shoulder pain following a fall that occurred yesterday. Patient states that he was walking down his porch steps when he slipped on some ice and fell. Patient denies hitting his head and denies loss of consciousness. Patient states that he tried icing his arm and shoulder with minimal relief. Patient is noted to have significant bruising to his right upper arm. Patient is not on any blood thinners.

## 2024-01-24 NOTE — DISCHARGE INSTRUCTIONS
Please keep your arm in a sling at all times even when sleeping.  Please follow-up with Dr. Armstrong as soon as possible and call his office today  For pain control, please start with the Tylenol and Motrin as prescribed  If the Tylenol and Motrin do not alleviate your pain, please take the Robaxin as prescribed  If the Tylenol, Motrin, and Robaxin do not control your pain, then please take the oxycodone as prescribed  If you have any nausea, please take the Zofran as prescribed  Please ice your shoulder, 15 to 20 minutes every hour while awake to help with the swelling  If you have worsening of pain despite taking all of your home medications, return to the ER  If you begin having any weakness or numbness then return to the ER  If you reinjure yourself, return to the ER immediately  If you have any severe worsening of symptoms at all, please return to the ER immediately

## 2024-01-24 NOTE — TELEPHONE ENCOUNTER
Per Dr. Armstrong; patient should be scheduled for Monday 1/29 @ 4:45pm.    Attempted to contact patient's wife (Nigel) who is listed on 11/3/23 Communication WALE OROURKE requesting that either she or patient call [facility] back to schedule an appt with Dr. Armstrong for right shoulder injury.

## 2024-01-24 NOTE — TELEPHONE ENCOUNTER
Per Dr. Armstrong-  He is recommending sx on patient's right shoulder/arm early next week. If patient is amendable to proceeding with surgery by Dr. Armstrong then Dr. Armstrong would like patient to call his PCP to see about obtaining medical clearance for surgery and also complete a CBC and BMP. CBC and BMP orders were completed earlier today at ED therefore I did not order another set of labs.     Attempted to contact wife again. No answer but was able to LVM requesting that she call office asap to discuss the option of sx with Dr. Armstrong and steps necessary to get ready for sx.

## 2024-01-24 NOTE — TELEPHONE ENCOUNTER
Pt left VM stating he needs a refill for methotrexate. Refill placed on 1/15 with 3 refills. Attempted to call pt back but VM not set up.    Adequate: hears normal conversation without difficulty

## 2024-01-24 NOTE — ED PROVIDER NOTES
[facility]  Emergency Department Encounter  Emergency Medicine Physician     Pt Name: Sonido Willard  MRN: [id number]  Birthdate 1950  Date of evaluation: 1/24/24  PCP:  Giovanna Bowers MD    CHIEF COMPLAINT       Chief Complaint   Patient presents with    Fall    Shoulder Injury       HISTORY OF PRESENT ILLNESS  (Location/Symptom, Timing/Onset, Context/Setting, Quality, Duration, Modifying Factors, Severity.)    Sonido Willard is a 73 y.o. male who presents with presents with right shoulder pain.  Patient states he fell yesterday on the ice.  States that he slipped on his stairs outside and then his right arm extended over his head and then he fell backwards.  States he has been having worsening pain to the right shoulder and bruising.  States he is never had an injury there before.  Endorses pain about the shoulder and the arm.  States he has some pain in his posterior shoulder.  No pain in his head or neck.  States he did not hit his head or neck.  Did not lose consciousness.        PAST MEDICAL / SURGICAL / SOCIAL / FAMILY HISTORY    has a past medical history of Arthritis, Cancer (HCC), Colon polyps, History of colon polyps, Hypothyroidism, Iron deficiency, Neutropenia (HCC), Osteoarthritis, Prediabetes, Prominent ileocecal valve, and Unspecified essential hypertension.     has a past surgical history that includes Colonoscopy (06-11-14); Upper gastrointestinal endoscopy (6/11/2014); Colonoscopy (07/03/2017); bone marrow biopsy; Colonoscopy (N/A, 12/4/2020); and Colonoscopy (N/A, 11/1/2023).    Social History     Socioeconomic History    Marital status:      Spouse name: Not on file    Number of children: Not on file    Years of education: Not on file    Highest education level: Not on file   Occupational History    Not on file   Tobacco Use    Smoking status: Every Day     Current packs/day: 0.00     Average packs/day: 0.3 packs/day for 30.0 years (7.5 ttl pk-yrs)     Types:

## 2024-01-24 NOTE — ED NOTES
IV fentanyl ordered earlier did not improve patient's pain rating per patient, just made him feel unsteady and sweaty. Orders received for Toradol and Dilaudid for pain management. Right arm support by arm sling per Dr. Vázquez to help stabilize arm injury pending CTA.

## 2024-01-25 ENCOUNTER — TELEPHONE (OUTPATIENT)
Dept: INTERNAL MEDICINE CLINIC | Age: 74
End: 2024-01-25

## 2024-01-25 ENCOUNTER — TELEPHONE (OUTPATIENT)
Dept: INFUSION THERAPY | Age: 74
End: 2024-01-25

## 2024-01-25 NOTE — TELEPHONE ENCOUNTER
Medical surgical clearance request received 01/25/24    Surgeon: Dr Armstrong     Procedure: Right humerus ORIF     Date of Procedure: 1/30/24    Last appt: 9/13/23    Next appt: Visit date not found    PATs received:    [] yes   [x] no

## 2024-01-25 NOTE — TELEPHONE ENCOUNTER
Patients wife called stating that the patient is having shoulder surgery Tuesday 1/30/24 and was asking if he should do anything differently with his methotrexate.  RN spoke to Dr. Narayanan and he said that he can continue with no interruptions.  Nigel updated and verbalized understanding.

## 2024-01-26 ENCOUNTER — ANESTHESIA EVENT (OUTPATIENT)
Dept: OPERATING ROOM | Age: 74
End: 2024-01-26
Payer: COMMERCIAL

## 2024-01-29 ENCOUNTER — OFFICE VISIT (OUTPATIENT)
Dept: ORTHOPEDIC SURGERY | Age: 74
End: 2024-01-29
Payer: COMMERCIAL

## 2024-01-29 ENCOUNTER — OFFICE VISIT (OUTPATIENT)
Dept: INTERNAL MEDICINE CLINIC | Age: 74
End: 2024-01-29
Payer: COMMERCIAL

## 2024-01-29 VITALS — HEIGHT: 64 IN | WEIGHT: 175 LBS | RESPIRATION RATE: 14 BRPM | BODY MASS INDEX: 29.88 KG/M2

## 2024-01-29 VITALS
OXYGEN SATURATION: 97 % | HEART RATE: 84 BPM | WEIGHT: 179 LBS | DIASTOLIC BLOOD PRESSURE: 70 MMHG | HEIGHT: 64 IN | SYSTOLIC BLOOD PRESSURE: 132 MMHG | BODY MASS INDEX: 30.56 KG/M2

## 2024-01-29 DIAGNOSIS — M25.511 RIGHT SHOULDER PAIN, UNSPECIFIED CHRONICITY: Primary | ICD-10-CM

## 2024-01-29 DIAGNOSIS — Z01.818 PREOP TESTING: Primary | ICD-10-CM

## 2024-01-29 PROCEDURE — 99215 OFFICE O/P EST HI 40 MIN: CPT | Performed by: INTERNAL MEDICINE

## 2024-01-29 PROCEDURE — 3075F SYST BP GE 130 - 139MM HG: CPT | Performed by: INTERNAL MEDICINE

## 2024-01-29 PROCEDURE — 1123F ACP DISCUSS/DSCN MKR DOCD: CPT | Performed by: INTERNAL MEDICINE

## 2024-01-29 PROCEDURE — 99204 OFFICE O/P NEW MOD 45 MIN: CPT | Performed by: ORTHOPAEDIC SURGERY

## 2024-01-29 PROCEDURE — 93000 ELECTROCARDIOGRAM COMPLETE: CPT | Performed by: INTERNAL MEDICINE

## 2024-01-29 PROCEDURE — 1123F ACP DISCUSS/DSCN MKR DOCD: CPT | Performed by: ORTHOPAEDIC SURGERY

## 2024-01-29 PROCEDURE — 3078F DIAST BP <80 MM HG: CPT | Performed by: INTERNAL MEDICINE

## 2024-01-29 RX ORDER — KETOROLAC TROMETHAMINE 10 MG/1
10 TABLET, FILM COATED ORAL
Qty: 9 TABLET | Refills: 0 | Status: CANCELLED | OUTPATIENT
Start: 2024-01-29 | End: 2024-02-01

## 2024-01-29 RX ORDER — SODIUM CHLORIDE 9 MG/ML
INJECTION, SOLUTION INTRAVENOUS PRN
Status: CANCELLED | OUTPATIENT
Start: 2024-01-29

## 2024-01-29 RX ORDER — SODIUM CHLORIDE 0.9 % (FLUSH) 0.9 %
5-40 SYRINGE (ML) INJECTION EVERY 12 HOURS SCHEDULED
Status: CANCELLED | OUTPATIENT
Start: 2024-01-29

## 2024-01-29 RX ORDER — SODIUM CHLORIDE 0.9 % (FLUSH) 0.9 %
5-40 SYRINGE (ML) INJECTION PRN
Status: CANCELLED | OUTPATIENT
Start: 2024-01-29

## 2024-01-29 RX ORDER — DOXYCYCLINE HYCLATE 100 MG
100 TABLET ORAL 2 TIMES DAILY
Qty: 14 TABLET | Refills: 0 | Status: CANCELLED | OUTPATIENT
Start: 2024-01-29 | End: 2024-02-05

## 2024-01-29 RX ORDER — HYDROCODONE BITARTRATE AND ACETAMINOPHEN 5; 325 MG/1; MG/1
1 TABLET ORAL EVERY 4 HOURS PRN
Qty: 42 TABLET | Refills: 0 | Status: CANCELLED | OUTPATIENT
Start: 2024-01-29 | End: 2024-02-05

## 2024-01-29 RX ORDER — ONDANSETRON 4 MG/1
4 TABLET, FILM COATED ORAL DAILY PRN
Qty: 20 TABLET | Refills: 0 | Status: CANCELLED | OUTPATIENT
Start: 2024-01-29

## 2024-01-29 RX ORDER — ACETAMINOPHEN 325 MG/1
1000 TABLET ORAL ONCE
Status: CANCELLED | OUTPATIENT
Start: 2024-01-29 | End: 2024-01-29

## 2024-01-29 ASSESSMENT — PATIENT HEALTH QUESTIONNAIRE - PHQ9
SUM OF ALL RESPONSES TO PHQ QUESTIONS 1-9: 0
SUM OF ALL RESPONSES TO PHQ9 QUESTIONS 1 & 2: 0
2. FEELING DOWN, DEPRESSED OR HOPELESS: 0
1. LITTLE INTEREST OR PLEASURE IN DOING THINGS: 0
SUM OF ALL RESPONSES TO PHQ QUESTIONS 1-9: 0

## 2024-01-29 NOTE — PROGRESS NOTES
DAY OF SURGERY/PROCEDURE  GUIDELINES    As a patient at the [facility], you can expect quality medical and nursing care that is centered on your individual needs. It is our goal to make your surgical experience as comfortable and excellent as possible.  ________________________________________________________________________    The following instructions are general guidelines, if any information on this sheet is different from what your doctor has instructed you to do, please follow your doctor's instructions.    Please arrive on 1/30/2024 @ 1100      Enter through entrance C. Check in at registration     Upon arrival you will be taken to the pre-operative area to get ready for surgery, your family will stay in the waiting room and visit with you once you are ready for surgery. Due to special limitations please limit visitation to 1-2 members of your family at a time. When it is time for surgery your family will return to the waiting room.    Nothing to eat, drink, smoke, suck or chew after midnight (no water, gum, mints, cigarettes, cigars, pipes, snuff, chewing tobacco, etc.) or your surgery may be canceled.     Take a shower or bath on the morning of your surgery/procedure (Hibiclens if directed) Do not apply any lotions.    Brush your teeth, but do not swallow any water    IN CASE OF ILLNESS - If you have a cold or flu symptoms (high fever, runny nose, sore throat, cough, etc.) rash, nausea, vomiting, loose stools, and/or recent contact with someone who has a contagious disease (chick pox, measles, etc.) please call your doctor before coming to the surgery center    Take a small sip of water with heart, blood pressure, and/or seizure medication the morning of surgery.     If applicable bring your:  Inhaler (s)  Hearing aid(s)  Eyeglasses and Case (If you wear contacts they have to be removed before surgery, bring case and solution)  CPAP     DO NOT take anticoagulants (blood thinners,

## 2024-01-29 NOTE — PROGRESS NOTES
Cleared for surgery with no further cardiac work up deemed necessary, moderate risk for intermediate risk procedure.

## 2024-01-29 NOTE — PROGRESS NOTES
discussion and would like to proceed with surgery.  All questions were appropriately answered    This note is created with the assistance of a speech recognition program.  While intending to generate adocument that actually reflects the content of the visit, the document can still have some errors including those of syntax and sound a like substitutions which may escape proof reading.  It such instances, actual meaningcan be extrapolated by contextual diversion.

## 2024-01-29 NOTE — PROGRESS NOTES
[facility]  [address] [address]  Dept: [phone]  Dept Fax: [phone]    Office Progress/Follow Up Note  Date of patient's visit: 1/29/2024  Patient's Name:  Sonido Willard YOB: 1950            Patient Care Team:  Giovanna Bowers MD as PCP - General (Internal Medicine)  Jimmy Nair MD as PCP - Internal Medicine (Otolaryngology)  Giovanna Bowers MD as PCP - Empaneled Provider  Kvng Mccauley MD as Consulting Physician (Gastroenterology)  Ignacio Narayanan MD as Consulting Physician (Hematology and Oncology)  Nabila Diaz RN as Registered Nurse (Oncology)    REASON FOR VISIT: Preop visit    HISTORY OF PRESENT ILLNESS:      Chief Complaint   Patient presents with    Pre-op Exam        History was obtained from the patient. Sonido Willard is a 73 y.o. is here for a preoperative visit.    The patient had a mechanical fall a week back, broke his right humerus and planned for ORIF tomorrow.  Apart from pain in the right upper extremity obese the patient is wearing a sling, pain on the right anterior aspect of his torso where he fell he reports having swelling in the right arm, no pain more distal to the elbow.    No fevers no chills, reported constipation however was able to have a bowel movement earlier today nonbloody/melanotic.    Patient denies any history of CAD, can walk 2 blocks without getting short of breath or without chest pain.  He reports that it is right knee pain that causes him to stop.  He can climb a flight of stairs with no difficulty.      Patient Active Problem List   Diagnosis    Essential hypertension    Hypothyroidism    Neutropenia (HCC)    Iron deficiency    History of colon polyps    Type 2 diabetes mellitus without complication, without long-term current use of insulin (HCC)    Prominent ileocecal valve    Colon polyps    Pharyngitis    Lymphoid hyperplasia    Malignant immunoproliferative disease (HCC)

## 2024-01-30 ENCOUNTER — APPOINTMENT (OUTPATIENT)
Dept: GENERAL RADIOLOGY | Age: 74
End: 2024-01-30
Attending: ORTHOPAEDIC SURGERY
Payer: COMMERCIAL

## 2024-01-30 ENCOUNTER — ANESTHESIA (OUTPATIENT)
Dept: OPERATING ROOM | Age: 74
End: 2024-01-30
Payer: COMMERCIAL

## 2024-01-30 ENCOUNTER — HOSPITAL ENCOUNTER (OUTPATIENT)
Age: 74
Setting detail: OUTPATIENT SURGERY
Discharge: HOME | End: 2024-01-30
Attending: ORTHOPAEDIC SURGERY | Admitting: ORTHOPAEDIC SURGERY
Payer: COMMERCIAL

## 2024-01-30 VITALS
SYSTOLIC BLOOD PRESSURE: 133 MMHG | HEIGHT: 65 IN | HEART RATE: 90 BPM | TEMPERATURE: 97.4 F | DIASTOLIC BLOOD PRESSURE: 78 MMHG | BODY MASS INDEX: 29.66 KG/M2 | WEIGHT: 178 LBS | OXYGEN SATURATION: 97 % | RESPIRATION RATE: 23 BRPM

## 2024-01-30 DIAGNOSIS — Z96.611 S/P REVERSE TOTAL SHOULDER ARTHROPLASTY, RIGHT: Primary | ICD-10-CM

## 2024-01-30 LAB
EKG ATRIAL RATE: 83 BPM
EKG P AXIS: 44 DEGREES
EKG P-R INTERVAL: 176 MS
EKG Q-T INTERVAL: 402 MS
EKG QRS DURATION: 82 MS
EKG QTC CALCULATION (BAZETT): 472 MS
EKG R AXIS: 8 DEGREES
EKG T AXIS: 11 DEGREES
EKG VENTRICULAR RATE: 83 BPM
GLUCOSE BLD-MCNC: 125 MG/DL (ref 75–110)
GLUCOSE BLD-MCNC: 127 MG/DL (ref 75–110)

## 2024-01-30 PROCEDURE — 7100000000 HC PACU RECOVERY - FIRST 15 MIN: Performed by: ORTHOPAEDIC SURGERY

## 2024-01-30 PROCEDURE — 64415 NJX AA&/STRD BRCH PLXS IMG: CPT | Performed by: ANESTHESIOLOGY

## 2024-01-30 PROCEDURE — 2500000003 HC RX 250 WO HCPCS: Performed by: NURSE ANESTHETIST, CERTIFIED REGISTERED

## 2024-01-30 PROCEDURE — 7100000011 HC PHASE II RECOVERY - ADDTL 15 MIN: Performed by: ORTHOPAEDIC SURGERY

## 2024-01-30 PROCEDURE — 6360000002 HC RX W HCPCS

## 2024-01-30 PROCEDURE — 7100000010 HC PHASE II RECOVERY - FIRST 15 MIN: Performed by: ORTHOPAEDIC SURGERY

## 2024-01-30 PROCEDURE — 73020 X-RAY EXAM OF SHOULDER: CPT

## 2024-01-30 PROCEDURE — 2580000003 HC RX 258: Performed by: ORTHOPAEDIC SURGERY

## 2024-01-30 PROCEDURE — 6360000002 HC RX W HCPCS: Performed by: ORTHOPAEDIC SURGERY

## 2024-01-30 PROCEDURE — C9290 INJ, BUPIVACAINE LIPOSOME: HCPCS | Performed by: ANESTHESIOLOGY

## 2024-01-30 PROCEDURE — 3700000000 HC ANESTHESIA ATTENDED CARE: Performed by: ORTHOPAEDIC SURGERY

## 2024-01-30 PROCEDURE — C1713 ANCHOR/SCREW BN/BN,TIS/BN: HCPCS | Performed by: ORTHOPAEDIC SURGERY

## 2024-01-30 PROCEDURE — 3600000014 HC SURGERY LEVEL 4 ADDTL 15MIN: Performed by: ORTHOPAEDIC SURGERY

## 2024-01-30 PROCEDURE — C1776 JOINT DEVICE (IMPLANTABLE): HCPCS | Performed by: ORTHOPAEDIC SURGERY

## 2024-01-30 PROCEDURE — 6370000000 HC RX 637 (ALT 250 FOR IP)

## 2024-01-30 PROCEDURE — 3700000001 HC ADD 15 MINUTES (ANESTHESIA): Performed by: ORTHOPAEDIC SURGERY

## 2024-01-30 PROCEDURE — 93005 ELECTROCARDIOGRAM TRACING: CPT

## 2024-01-30 PROCEDURE — 2709999900 HC NON-CHARGEABLE SUPPLY: Performed by: ORTHOPAEDIC SURGERY

## 2024-01-30 PROCEDURE — 7100000001 HC PACU RECOVERY - ADDTL 15 MIN: Performed by: ORTHOPAEDIC SURGERY

## 2024-01-30 PROCEDURE — 6360000002 HC RX W HCPCS: Performed by: ANESTHESIOLOGY

## 2024-01-30 PROCEDURE — 82947 ASSAY GLUCOSE BLOOD QUANT: CPT

## 2024-01-30 PROCEDURE — 2720000010 HC SURG SUPPLY STERILE: Performed by: ORTHOPAEDIC SURGERY

## 2024-01-30 PROCEDURE — 3600000004 HC SURGERY LEVEL 4 BASE: Performed by: ORTHOPAEDIC SURGERY

## 2024-01-30 PROCEDURE — 6360000002 HC RX W HCPCS: Performed by: NURSE ANESTHETIST, CERTIFIED REGISTERED

## 2024-01-30 DEVICE — AR, SMALL SOCKET INSERT, 32MM NEUTRAL EPLUS
Type: IMPLANTABLE DEVICE | Site: SHOULDER | Status: FUNCTIONAL
Brand: DJO SURGICAL

## 2024-01-30 DEVICE — ALTIVATE REVERSE, HUMERAL STEM, SMALL SHELL, SZ 8X108MM
Type: IMPLANTABLE DEVICE | Site: SHOULDER | Status: FUNCTIONAL
Brand: DJO SURGICAL

## 2024-01-30 DEVICE — SCREW, LOCKING BONE, RSP, 5X22
Type: IMPLANTABLE DEVICE | Site: SHOULDER | Status: FUNCTIONAL
Brand: DJO SURGICAL

## 2024-01-30 DEVICE — GLENOID, HEAD W/RETAINING SCREW, RSP, 32MM/NEUTRAL
Type: IMPLANTABLE DEVICE | Site: SHOULDER | Status: FUNCTIONAL
Brand: DJO SURGICAL

## 2024-01-30 DEVICE — PLUG, CEMENT SZ. 10.0
Type: IMPLANTABLE DEVICE | Site: SHOULDER | Status: FUNCTIONAL
Brand: DJO SURGICAL

## 2024-01-30 DEVICE — SCREW, LOCKING BONE, RSP, 5X14
Type: IMPLANTABLE DEVICE | Site: SHOULDER | Status: FUNCTIONAL
Brand: DJO SURGICAL

## 2024-01-30 DEVICE — RSP BASEPLATE, 30MM, W/P2 COATING
Type: IMPLANTABLE DEVICE | Site: SHOULDER | Status: FUNCTIONAL
Brand: DJO SURGICAL

## 2024-01-30 DEVICE — CEMENT BNE 40GM W/ GENT HI VISC RADPQ FOR REV SURG: Type: IMPLANTABLE DEVICE | Site: SHOULDER | Status: FUNCTIONAL

## 2024-01-30 RX ORDER — SODIUM CHLORIDE 9 MG/ML
INJECTION, SOLUTION INTRAVENOUS PRN
Status: DISCONTINUED | OUTPATIENT
Start: 2024-01-30 | End: 2024-01-30 | Stop reason: HOSPADM

## 2024-01-30 RX ORDER — MIDAZOLAM HYDROCHLORIDE 2 MG/2ML
2 INJECTION, SOLUTION INTRAMUSCULAR; INTRAVENOUS
Status: DISCONTINUED | OUTPATIENT
Start: 2024-01-30 | End: 2024-01-30 | Stop reason: HOSPADM

## 2024-01-30 RX ORDER — LABETALOL HYDROCHLORIDE 5 MG/ML
10 INJECTION, SOLUTION INTRAVENOUS
Status: DISCONTINUED | OUTPATIENT
Start: 2024-01-30 | End: 2024-01-30 | Stop reason: HOSPADM

## 2024-01-30 RX ORDER — MIDAZOLAM HYDROCHLORIDE 2 MG/2ML
2 INJECTION, SOLUTION INTRAMUSCULAR; INTRAVENOUS ONCE
Status: COMPLETED | OUTPATIENT
Start: 2024-01-30 | End: 2024-01-30

## 2024-01-30 RX ORDER — ROCURONIUM BROMIDE 10 MG/ML
INJECTION, SOLUTION INTRAVENOUS PRN
Status: DISCONTINUED | OUTPATIENT
Start: 2024-01-30 | End: 2024-01-30 | Stop reason: SDUPTHER

## 2024-01-30 RX ORDER — METOCLOPRAMIDE HYDROCHLORIDE 5 MG/ML
10 INJECTION INTRAMUSCULAR; INTRAVENOUS
Status: DISCONTINUED | OUTPATIENT
Start: 2024-01-30 | End: 2024-01-30 | Stop reason: HOSPADM

## 2024-01-30 RX ORDER — IPRATROPIUM BROMIDE AND ALBUTEROL SULFATE 2.5; .5 MG/3ML; MG/3ML
1 SOLUTION RESPIRATORY (INHALATION)
Status: DISCONTINUED | OUTPATIENT
Start: 2024-01-30 | End: 2024-01-30 | Stop reason: HOSPADM

## 2024-01-30 RX ORDER — ONDANSETRON 2 MG/ML
INJECTION INTRAMUSCULAR; INTRAVENOUS PRN
Status: DISCONTINUED | OUTPATIENT
Start: 2024-01-30 | End: 2024-01-30 | Stop reason: SDUPTHER

## 2024-01-30 RX ORDER — SODIUM CHLORIDE 0.9 % (FLUSH) 0.9 %
5-40 SYRINGE (ML) INJECTION PRN
Status: DISCONTINUED | OUTPATIENT
Start: 2024-01-30 | End: 2024-01-30 | Stop reason: HOSPADM

## 2024-01-30 RX ORDER — DIPHENHYDRAMINE HYDROCHLORIDE 50 MG/ML
12.5 INJECTION INTRAMUSCULAR; INTRAVENOUS
Status: DISCONTINUED | OUTPATIENT
Start: 2024-01-30 | End: 2024-01-30 | Stop reason: HOSPADM

## 2024-01-30 RX ORDER — SODIUM CHLORIDE 0.9 % (FLUSH) 0.9 %
5-40 SYRINGE (ML) INJECTION EVERY 12 HOURS SCHEDULED
Status: DISCONTINUED | OUTPATIENT
Start: 2024-01-30 | End: 2024-01-30 | Stop reason: HOSPADM

## 2024-01-30 RX ORDER — LIDOCAINE HYDROCHLORIDE 10 MG/ML
INJECTION, SOLUTION INFILTRATION; PERINEURAL PRN
Status: DISCONTINUED | OUTPATIENT
Start: 2024-01-30 | End: 2024-01-30 | Stop reason: SDUPTHER

## 2024-01-30 RX ORDER — HYDRALAZINE HYDROCHLORIDE 20 MG/ML
10 INJECTION INTRAMUSCULAR; INTRAVENOUS
Status: DISCONTINUED | OUTPATIENT
Start: 2024-01-30 | End: 2024-01-30 | Stop reason: HOSPADM

## 2024-01-30 RX ORDER — DEXAMETHASONE SODIUM PHOSPHATE 10 MG/ML
10 INJECTION, SOLUTION INTRAMUSCULAR; INTRAVENOUS ONCE
Status: DISCONTINUED | OUTPATIENT
Start: 2024-01-30 | End: 2024-01-30 | Stop reason: HOSPADM

## 2024-01-30 RX ORDER — DEXAMETHASONE SODIUM PHOSPHATE 10 MG/ML
INJECTION, SOLUTION INTRAMUSCULAR; INTRAVENOUS PRN
Status: DISCONTINUED | OUTPATIENT
Start: 2024-01-30 | End: 2024-01-30 | Stop reason: SDUPTHER

## 2024-01-30 RX ORDER — ACETAMINOPHEN 500 MG
1000 TABLET ORAL ONCE
Status: COMPLETED | OUTPATIENT
Start: 2024-01-30 | End: 2024-01-30

## 2024-01-30 RX ORDER — KETOROLAC TROMETHAMINE 30 MG/ML
INJECTION, SOLUTION INTRAMUSCULAR; INTRAVENOUS PRN
Status: DISCONTINUED | OUTPATIENT
Start: 2024-01-30 | End: 2024-01-30 | Stop reason: SDUPTHER

## 2024-01-30 RX ORDER — ACETAMINOPHEN 500 MG
TABLET ORAL
Status: COMPLETED
Start: 2024-01-30 | End: 2024-01-30

## 2024-01-30 RX ORDER — CEFAZOLIN 2 G/1
INJECTION, POWDER, FOR SOLUTION INTRAMUSCULAR; INTRAVENOUS
Status: DISCONTINUED
Start: 2024-01-30 | End: 2024-01-30 | Stop reason: HOSPADM

## 2024-01-30 RX ORDER — FENTANYL CITRATE 50 UG/ML
100 INJECTION, SOLUTION INTRAMUSCULAR; INTRAVENOUS ONCE
Status: COMPLETED | OUTPATIENT
Start: 2024-01-30 | End: 2024-01-30

## 2024-01-30 RX ORDER — ONDANSETRON 2 MG/ML
4 INJECTION INTRAMUSCULAR; INTRAVENOUS
Status: DISCONTINUED | OUTPATIENT
Start: 2024-01-30 | End: 2024-01-30 | Stop reason: HOSPADM

## 2024-01-30 RX ORDER — FENTANYL CITRATE 50 UG/ML
INJECTION, SOLUTION INTRAMUSCULAR; INTRAVENOUS
Status: COMPLETED
Start: 2024-01-30 | End: 2024-01-30

## 2024-01-30 RX ORDER — PHENYLEPHRINE HCL IN 0.9% NACL 1 MG/10 ML
SYRINGE (ML) INTRAVENOUS PRN
Status: DISCONTINUED | OUTPATIENT
Start: 2024-01-30 | End: 2024-01-30 | Stop reason: SDUPTHER

## 2024-01-30 RX ORDER — SODIUM CHLORIDE, SODIUM LACTATE, POTASSIUM CHLORIDE, CALCIUM CHLORIDE 600; 310; 30; 20 MG/100ML; MG/100ML; MG/100ML; MG/100ML
INJECTION, SOLUTION INTRAVENOUS CONTINUOUS
Status: DISCONTINUED | OUTPATIENT
Start: 2024-01-30 | End: 2024-01-30 | Stop reason: HOSPADM

## 2024-01-30 RX ORDER — HYDROCODONE BITARTRATE AND ACETAMINOPHEN 5; 325 MG/1; MG/1
1 TABLET ORAL EVERY 4 HOURS PRN
Qty: 42 TABLET | Refills: 0 | Status: SHIPPED | OUTPATIENT
Start: 2024-01-30 | End: 2024-02-06

## 2024-01-30 RX ORDER — BUPIVACAINE HYDROCHLORIDE 5 MG/ML
INJECTION, SOLUTION EPIDURAL; INTRACAUDAL
Status: COMPLETED | OUTPATIENT
Start: 2024-01-30 | End: 2024-01-30

## 2024-01-30 RX ORDER — MIDAZOLAM HYDROCHLORIDE 1 MG/ML
INJECTION INTRAMUSCULAR; INTRAVENOUS
Status: COMPLETED
Start: 2024-01-30 | End: 2024-01-30

## 2024-01-30 RX ORDER — BUPIVACAINE HYDROCHLORIDE 5 MG/ML
INJECTION, SOLUTION EPIDURAL; INTRACAUDAL
Status: COMPLETED
Start: 2024-01-30 | End: 2024-01-30

## 2024-01-30 RX ORDER — TRANEXAMIC ACID 100 MG/ML
INJECTION, SOLUTION INTRAVENOUS PRN
Status: DISCONTINUED | OUTPATIENT
Start: 2024-01-30 | End: 2024-01-30 | Stop reason: SDUPTHER

## 2024-01-30 RX ORDER — PROPOFOL 10 MG/ML
INJECTION, EMULSION INTRAVENOUS PRN
Status: DISCONTINUED | OUTPATIENT
Start: 2024-01-30 | End: 2024-01-30 | Stop reason: SDUPTHER

## 2024-01-30 RX ADMIN — HYDROMORPHONE HYDROCHLORIDE 0.5 MG: 1 INJECTION, SOLUTION INTRAMUSCULAR; INTRAVENOUS; SUBCUTANEOUS at 14:54

## 2024-01-30 RX ADMIN — CEFAZOLIN 2000 MG: 2 INJECTION, POWDER, FOR SOLUTION INTRAMUSCULAR; INTRAVENOUS at 14:32

## 2024-01-30 RX ADMIN — SODIUM CHLORIDE: 9 INJECTION, SOLUTION INTRAVENOUS at 12:47

## 2024-01-30 RX ADMIN — KETOROLAC TROMETHAMINE 15 MG: 30 INJECTION, SOLUTION INTRAMUSCULAR; INTRAVENOUS at 16:55

## 2024-01-30 RX ADMIN — FENTANYL CITRATE 100 MCG: 50 INJECTION, SOLUTION INTRAMUSCULAR; INTRAVENOUS at 12:54

## 2024-01-30 RX ADMIN — SODIUM CHLORIDE: 9 INJECTION, SOLUTION INTRAVENOUS at 16:25

## 2024-01-30 RX ADMIN — DEXAMETHASONE SODIUM PHOSPHATE 10 MG: 10 INJECTION INTRAMUSCULAR; INTRAVENOUS at 14:32

## 2024-01-30 RX ADMIN — ROCURONIUM BROMIDE 20 MG: 10 SOLUTION INTRAVENOUS at 14:54

## 2024-01-30 RX ADMIN — HYDROMORPHONE HYDROCHLORIDE 0.5 MG: 1 INJECTION, SOLUTION INTRAMUSCULAR; INTRAVENOUS; SUBCUTANEOUS at 17:04

## 2024-01-30 RX ADMIN — Medication 100 MCG: at 15:17

## 2024-01-30 RX ADMIN — ROCURONIUM BROMIDE 50 MG: 10 SOLUTION INTRAVENOUS at 14:25

## 2024-01-30 RX ADMIN — ONDANSETRON 4 MG: 2 INJECTION INTRAMUSCULAR; INTRAVENOUS at 16:50

## 2024-01-30 RX ADMIN — TRANEXAMIC ACID 1000 MG: 100 INJECTION INTRAVENOUS at 14:50

## 2024-01-30 RX ADMIN — MIDAZOLAM HYDROCHLORIDE 2 MG: 2 INJECTION, SOLUTION INTRAMUSCULAR; INTRAVENOUS at 12:53

## 2024-01-30 RX ADMIN — ACETAMINOPHEN 1000 MG: 500 TABLET ORAL at 12:47

## 2024-01-30 RX ADMIN — Medication 1000 MG: at 12:47

## 2024-01-30 RX ADMIN — PROPOFOL 150 MG: 10 INJECTION, EMULSION INTRAVENOUS at 14:25

## 2024-01-30 RX ADMIN — ROCURONIUM BROMIDE 10 MG: 10 SOLUTION INTRAVENOUS at 15:17

## 2024-01-30 RX ADMIN — LIDOCAINE HYDROCHLORIDE 40 MG: 10 INJECTION, SOLUTION INFILTRATION; PERINEURAL at 14:25

## 2024-01-30 RX ADMIN — MIDAZOLAM HYDROCHLORIDE 2 MG: 1 INJECTION, SOLUTION INTRAMUSCULAR; INTRAVENOUS at 12:53

## 2024-01-30 RX ADMIN — BUPIVACAINE 10 ML: 13.3 INJECTION, SUSPENSION, LIPOSOMAL INFILTRATION at 12:55

## 2024-01-30 RX ADMIN — BUPIVACAINE HYDROCHLORIDE 10 ML: 5 INJECTION, SOLUTION EPIDURAL; INTRACAUDAL; PERINEURAL at 12:55

## 2024-01-30 ASSESSMENT — PAIN DESCRIPTION - LOCATION
LOCATION: SHOULDER
LOCATION: SHOULDER

## 2024-01-30 ASSESSMENT — PAIN SCALES - GENERAL
PAINLEVEL_OUTOF10: 10
PAINLEVEL_OUTOF10: 8

## 2024-01-30 ASSESSMENT — PAIN - FUNCTIONAL ASSESSMENT
PAIN_FUNCTIONAL_ASSESSMENT: 0-10
PAIN_FUNCTIONAL_ASSESSMENT: PREVENTS OR INTERFERES WITH ALL ACTIVE AND SOME PASSIVE ACTIVITIES
PAIN_FUNCTIONAL_ASSESSMENT: NONE - DENIES PAIN

## 2024-01-30 ASSESSMENT — PAIN DESCRIPTION - ORIENTATION
ORIENTATION: RIGHT
ORIENTATION: RIGHT

## 2024-01-30 ASSESSMENT — PAIN DESCRIPTION - DESCRIPTORS: DESCRIPTORS: JABBING;SHARP

## 2024-01-30 NOTE — H&P
Update History & Physical    The patient's History and Physical of January 29, 2024 was reviewed with the patient and I examined the patient. There was no change. The surgical site was confirmed by the patient and me.       Plan: The risks, benefits, expected outcome, and alternative to the recommended procedure have been discussed with the patient. Patient understands and wants to proceed with the procedure.     Electronically signed by Cathi Armstrong MD on 1/30/2024 at 2:16 PM

## 2024-01-30 NOTE — OP NOTE
Operative Report     Date of Procedure: 1/30/2024     Preop Diagnosis:        1. Right 4- part proximal humerus fracture      Postop Diagnosis:      Right 4-part proximal humerus fracture     Operation:                    1. Right Reverse Shoulder Arthroplasty (27285)       Surgeon: Cathi Armstrong MD     Surgical Assistant:   No resident present. Tobi Cartagena PA-C, medically necessary for patient positioning, surgical site exposure, wound closure, and efficient completion of case.       Anesthesia: General with right interscalene nerve block     EBL: 100 cc     Summary of findings: Displaced four-part proximal humerus fracture  Specimen: None     Implants used:  DJO Altivate Reverse Shoulder               - Uncemented baseplate with four 5.0 mm locking screws screws measuring 22 mm, 22 mm, 14 mm, 14 mm              -32 neutral glenosphere   - Cemented AltiVate size 8 humeral Stem   -32  standard Poly Insert     Indications: Mr. Willard is a 73-year-old gentleman who presents with a displaced comminuted fracture of the proximal humerus.  Recent radiographs and CT Scan revealed a displaced four-part proximal humerus fracture.  Following a discussion with regards to her treatment options including non-surgical treatment and operative intervention, he elected to forgo attempts at conservative treatment and proceed with surgery by way of a right reverse shoulder replacement. Risks, benefits, and alternatives were fully discussed.  Postoperative limitations and limitations of the procedure were discussed in detail.  The patient understood risks, alternatives, complications, & post-operative limitations and wishes to proceed.                Operative Report:  Following appropriate identification of the patient and his operative extremity in the preoperative area, consent was reviewed with patient. Risks, benefits, and alternatives were discussed. All questions were answered. The anesthesia team administered a right

## 2024-01-30 NOTE — BRIEF OP NOTE
Brief Postoperative Note      Patient: Sonido Willard  YOB: 1950  MRN: [id number]    Date of Procedure: 1/30/2024    Pre-Op Diagnosis Codes:     * Closed fracture of right upper extremity, initial encounter [S42.301A]    Post-Op Diagnosis: Same       Procedure(s):  RIGHT SHOULDER  REVERSE TOTAL ARTHROPLASTY WITH DJO AND PRE-OP INTERSCALENE BLOCK WITH EXPAREL    Surgeon(s):  Cathi Armstrong MD    Assistant:  * No surgical staff found *    Anesthesia: General    Estimated Blood Loss (mL): 100    Complications: None    Specimens:   * No specimens in log *    Implants:  Implant Name Type Inv. Item Serial No.  Lot No. LRB No. Used Action   CEMENT BNE 40GM W/ GENT HI VISC RADPQ FOR REV SURG - [id number]  CEMENT BNE 40GM W/ GENT HI VISC RADPQ FOR REV SURG  [employer]- [id number] Right 2 Implanted   BASEPLATE ERASTO 30 MM P2 COAT RSP - [id number]  BASEPLATE ERASTO 30 MM P2 COAT RSP  [facility] [id number] Right 1 Implanted   SPHERE ERASTO [id number] NEUT W/ RET SCR RSP - [id number]  SPHERE ERASTO [id number] NEUT W/ RET SCR RSP  [employer] [facility] [id number] Right 1 Implanted   SCREW BNE L14MM DIA5MM TI NONLOCKING FOR ERASTO BASEPLT FIX - [id number]  SCREW BNE L14MM DIA5MM TI NONLOCKING FOR ERASTO BASEPLT FIX  [facility] - [facility] [id number] Right 1 Implanted   SCREW BNE L22MM DIA5MM TI NONLOCKING FOR ERASTO BASEPLT FIX - [id number]  SCREW BNE L22MM DIA5MM TI NONLOCKING FOR ERASTO BASEPLT FIX  [facility] [id number] Right 1 Implanted   SCREW BNE L22MM DIA5MM TI NONLOCKING FOR ERASTO BASEPLT FIX - [id number]  SCREW BNE L22MM DIA5MM TI NONLOCKING FOR ERASTO BASEPLT FIX  [facility] [id number] Right 1 Implanted   SCREW BNE L14MM DIA5MM TI NONLOCKING FOR ERASTO BASEPLT FIX - [id number]  SCREW BNE L14MM DIA5MM TI NONLOCKING FOR ERASTO BASEPLT FIX  [facility] - DJO SURGICAL-WD [id number] Right 1 Implanted   [employer]OM[employer] [id number]

## 2024-01-30 NOTE — ANESTHESIA PRE PROCEDURE
Department of Anesthesiology  Preprocedure Note       Name:  Sonido Willard   Age:  73 y.o.  :  1950                                          MRN:  [id number]         Date:  2024      Surgeon: Surgeon(s):  Cathi Armstrong MD    Procedure: Procedure(s):  RIGHT SHOULDER  REVERSE TOTAL ARTHROPLASTY WITH DJO AND PRE-OP INTERSCALENE BLOCK WITH EXPAREL    Medications prior to admission:   Prior to Admission medications    Medication Sig Start Date End Date Taking? Authorizing Provider   ibuprofen (IBU) 600 MG tablet Take 1 tablet by mouth every 8 hours as needed for Pain 24  Don Vázquez DO   acetaminophen (TYLENOL) 500 MG tablet Take 2 tablets by mouth 3 times daily for 7 days 24  Don Vázquez DO   methocarbamol (ROBAXIN-750) 750 MG tablet Take 1 tablet by mouth 3 times daily as needed (muscle pain)  Patient not taking: Reported on 2024 1/24/24 2/3/24  Don Vázquez DO   ondansetron (ZOFRAN) 4 MG tablet Take 1 tablet by mouth every 8 hours as needed for Nausea 24   Don Vázquez DO   methotrexate (RHEUMATREX) 2.5 MG chemo tablet TAKE 8 TABLETS ONCE WEEKLY 24   Ignacio Narayanan MD   sildenafil (VIAGRA) 50 MG tablet Take 1 tablet by mouth as needed for Erectile Dysfunction 23   Giovanna Bowers MD   omeprazole (PRILOSEC) 40 MG delayed release capsule  10/22/23   Provider, MD Vonda   polyethylene glycol (GLYCOLAX) 17 GM/SCOOP powder Please follow instructions provided to you by your provider. 10/20/23   Bienvenido Handy APRN - NP   bisacodyl 5 MG EC tablet Please follow instructions given to you by your provider.  Patient not taking: Reported on 2024 10/20/23   Bienvenido Handy APRN - NP   hydroCHLOROthiazide (HYDRODIURIL) 25 MG tablet Take 1 tablet by mouth daily 23   Junaid Bang MD   enalapril (VASOTEC) 20 MG tablet Take 1 tablet by mouth 2 times daily 23   Giovanna Bowers MD   metFORMIN

## 2024-01-30 NOTE — DISCHARGE INSTRUCTIONS
Cathi Armstrong MD  [facility]  Specializing in Shoulder and Elbow Surgery      POSTOPERATIVE INSTRUCTIONS    Surgery: Right Shoulder Reverse Total Replacement    DIET  Begin with clear liquids and light foods (jellos, soups, etc.).  Progress to your normal diet if you are not nauseated.    WOUND CARE  Maintain your operative dressing, may loosen bandage if swelling occurs.  It is normal for joints to bleed and swell following surgery - if blood soaks onto the bandage, do not become alarmed - reinforce with additional dressing.  Surgical dressing is changed on the second post-operative day, and daily after that with clean gauze and tape. If it gets wet, it should be changed right away.   To avoid infection, keep surgical incisions clean and dry - you may shower by placing Saran wrap or Press-and-seal over the surgical site before showering, and afterwards take everything off and apply clean gauze dressings - NO immersion of operative site (i.e. bath, pool).    MEDICATIONS  You received a nerve block prior to surgery - this will typically wear off within 24-36 hours. Start taking your prescription pain medication before it does.  Most patients will require some narcotic pain medication for a short period of time - Start it before numbing medicine wears off, and use as directed on the bottle.  Common side effects of the pain medication are nausea, drowsiness, and constipation - to decrease the side effects, take medication with food - if constipation occurs, consider taking an over-the-counter laxative.  If you are having problems with nausea and vomiting, take your anti-emetic as prescribed, otherwise, contact the office to possibly have your medication changed ([phone]).  Do not drive a car or operate machinery while taking the narcotic medication.  Please resume all home medications, unless instructed otherwise.    ACTIVITY  Keep the limb elevated for several days

## 2024-01-30 NOTE — ANESTHESIA PROCEDURE NOTES
Peripheral Block    Patient location during procedure: pre-op  Reason for block: post-op pain management and at surgeon's request  Start time: 1/30/2024 12:55 PM  End time: 1/30/2024 12:56 PM  Staffing  Performed: anesthesiologist   Anesthesiologist: Lizzy Jiménez MD  Performed by: Lizzy Jiménez MD  Authorized by: Lizzy Jiménez MD    Preanesthetic Checklist  Completed: patient identified, IV checked, site marked, risks and benefits discussed, surgical/procedural consents, equipment checked, pre-op evaluation, timeout performed, anesthesia consent given, oxygen available, monitors applied/VS acknowledged, fire risk safety assessment completed and verbalized and blood product R/B/A discussed and consented  Peripheral Block   Patient position: supine  Prep: ChloraPrep  Provider prep: sterile gloves  Patient monitoring: cardiac monitor, continuous pulse ox, frequent blood pressure checks and responsive to questions  Block type: Brachial plexus  Interscalene  Laterality: right  Injection technique: single-shot  Guidance: ultrasound guided    Needle   Needle type: insulated echogenic nerve stimulator needle   Needle gauge: 20 G  Needle localization: anatomical landmarks and ultrasound guidance  Needle length: 5 cm  Assessment   Injection assessment: negative aspiration for heme, no paresthesia on injection, local visualized surrounding nerve on ultrasound and no intravascular symptoms  Paresthesia pain: none  Slow fractionated injection: yes  Hemodynamics: stable  Outcomes: uncomplicated    Medications Administered  BUPivacaine (MARCAINE) PF injection 0.5% - Perineural   10 mL - 1/30/2024 12:55:00 PM  BUPivacaine liposome (EXPAREL) injection 1.3% - Perineural   10 mL - 1/30/2024 12:55:00 PM

## 2024-01-31 ENCOUNTER — TELEPHONE (OUTPATIENT)
Dept: ORTHOPEDIC SURGERY | Age: 74
End: 2024-01-31

## 2024-01-31 NOTE — TELEPHONE ENCOUNTER
Pt's wife Nigel, listed on 11/3/23 KARINA, calls in requesting when pt should begin pendulum exercises. Nigel informed that pt should begin pendulum exercises the day after sx. Nigel voices understanding.

## 2024-01-31 NOTE — ANESTHESIA POSTPROCEDURE EVALUATION
Department of Anesthesiology  Postprocedure Note    Patient: Sonido Willard  MRN: [id number]  YOB: 1950  Date of evaluation: 1/30/2024    Procedure Summary       Date: 01/30/24 Room / Location: [address]    Anesthesia Start: 1419 Anesthesia Stop: 1750    Procedure: RIGHT SHOULDER  REVERSE TOTAL ARTHROPLASTY WITH DJO AND PRE-OP INTERSCALENE BLOCK WITH EXPAREL (Right: Shoulder) Diagnosis:       Closed fracture of right upper extremity, initial encounter      (Closed fracture of right upper extremity, initial encounter [S42.301A])    Surgeons: Cathi Armstrong MD Responsible Provider: Radha Pena MD    Anesthesia Type: general, regional ASA Status: 2            Anesthesia Type: No value filed.    James Phase I: James Score: 8    James Phase II:      Anesthesia Post Evaluation    Patient location during evaluation: PACU  Patient participation: complete - patient participated  Level of consciousness: awake and alert  Airway patency: patent  Nausea & Vomiting: no nausea and no vomiting  Cardiovascular status: blood pressure returned to baseline  Respiratory status: acceptable and room air  Hydration status: euvolemic  Pain management: adequate and satisfactory to patient    No notable events documented.

## 2024-02-01 RX ORDER — ATORVASTATIN CALCIUM 10 MG/1
10 TABLET, FILM COATED ORAL DAILY
Qty: 90 TABLET | Refills: 1 | Status: SHIPPED | OUTPATIENT
Start: 2024-02-01

## 2024-02-05 DIAGNOSIS — E03.9 HYPOTHYROIDISM, UNSPECIFIED TYPE: ICD-10-CM

## 2024-02-05 RX ORDER — LEVOTHYROXINE SODIUM 0.12 MG/1
125 TABLET ORAL DAILY
Qty: 90 TABLET | Refills: 3 | Status: SHIPPED | OUTPATIENT
Start: 2024-02-05

## 2024-02-05 RX ORDER — OMEPRAZOLE 40 MG/1
40 CAPSULE, DELAYED RELEASE ORAL DAILY
Qty: 90 CAPSULE | Refills: 2 | Status: SHIPPED | OUTPATIENT
Start: 2024-02-05

## 2024-02-05 RX ORDER — ATORVASTATIN CALCIUM 10 MG/1
10 TABLET, FILM COATED ORAL DAILY
Qty: 90 TABLET | Refills: 3 | Status: SHIPPED | OUTPATIENT
Start: 2024-02-05

## 2024-02-05 NOTE — TELEPHONE ENCOUNTER
LF 1/30/24.  Pt said he will run out tomorrow evening.  I suggested reducing med to q 6-8 hrs just in case he didn't get the script approved right away.

## 2024-02-07 DIAGNOSIS — M25.511 RIGHT SHOULDER PAIN, UNSPECIFIED CHRONICITY: Primary | ICD-10-CM

## 2024-02-07 RX ORDER — HYDROCODONE BITARTRATE AND ACETAMINOPHEN 5; 325 MG/1; MG/1
1 TABLET ORAL EVERY 6 HOURS PRN
Qty: 20 TABLET | Refills: 0 | Status: SHIPPED | OUTPATIENT
Start: 2024-02-07 | End: 2024-02-12

## 2024-02-07 RX ORDER — HYDROCODONE BITARTRATE AND ACETAMINOPHEN 5; 325 MG/1; MG/1
1 TABLET ORAL EVERY 6 HOURS PRN
Qty: 28 TABLET | Refills: 0 | OUTPATIENT
Start: 2024-02-07 | End: 2024-02-14

## 2024-02-08 ENCOUNTER — OFFICE VISIT (OUTPATIENT)
Dept: ONCOLOGY | Age: 74
End: 2024-02-08
Payer: COMMERCIAL

## 2024-02-08 ENCOUNTER — TELEPHONE (OUTPATIENT)
Dept: ONCOLOGY | Age: 74
End: 2024-02-08

## 2024-02-08 ENCOUNTER — HOSPITAL ENCOUNTER (OUTPATIENT)
Age: 74
Discharge: HOME | End: 2024-02-08
Payer: COMMERCIAL

## 2024-02-08 VITALS
TEMPERATURE: 97.8 F | HEART RATE: 89 BPM | WEIGHT: 177.1 LBS | DIASTOLIC BLOOD PRESSURE: 81 MMHG | SYSTOLIC BLOOD PRESSURE: 142 MMHG | BODY MASS INDEX: 29.47 KG/M2

## 2024-02-08 DIAGNOSIS — C84.00 MYCOSIS FUNGOIDES, UNSPECIFIED BODY REGION (HCC): Primary | ICD-10-CM

## 2024-02-08 DIAGNOSIS — C91.10 CHRONIC LARGE GRANULAR LYMPHOCYTIC LEUKEMIA (HCC): ICD-10-CM

## 2024-02-08 LAB
ALBUMIN SERPL-MCNC: 3.4 G/DL (ref 3.5–5.2)
ALBUMIN/GLOB SERPL: 1.2 {RATIO} (ref 1–2.5)
ALP SERPL-CCNC: 126 U/L (ref 40–129)
ALT SERPL-CCNC: 16 U/L (ref 5–41)
ANION GAP SERPL CALCULATED.3IONS-SCNC: 6 MMOL/L (ref 9–17)
AST SERPL-CCNC: 18 U/L
BASOPHILS # BLD: 0.1 K/UL (ref 0–0.2)
BASOPHILS NFR BLD: 1 % (ref 0–2)
BILIRUB SERPL-MCNC: 0.4 MG/DL (ref 0.3–1.2)
BUN SERPL-MCNC: 18 MG/DL (ref 8–23)
CALCIUM SERPL-MCNC: 9.3 MG/DL (ref 8.6–10.4)
CHLORIDE SERPL-SCNC: 98 MMOL/L (ref 98–107)
CO2 SERPL-SCNC: 33 MMOL/L (ref 20–31)
CREAT SERPL-MCNC: 0.9 MG/DL (ref 0.7–1.2)
EOSINOPHIL # BLD: 0.1 K/UL (ref 0–0.4)
EOSINOPHILS RELATIVE PERCENT: 1 % (ref 1–4)
ERYTHROCYTE [DISTWIDTH] IN BLOOD BY AUTOMATED COUNT: 13.9 % (ref 12.5–15.4)
GFR SERPL CREATININE-BSD FRML MDRD: >60 ML/MIN/1.73M2
GLUCOSE SERPL-MCNC: 191 MG/DL (ref 70–99)
HCT VFR BLD AUTO: 32.7 % (ref 41–53)
HGB BLD-MCNC: 11 G/DL (ref 13.5–17.5)
LYMPHOCYTES NFR BLD: 1.1 K/UL (ref 1–4.8)
LYMPHOCYTES RELATIVE PERCENT: 13 % (ref 24–44)
MCH RBC QN AUTO: 31.4 PG (ref 26–34)
MCHC RBC AUTO-ENTMCNC: 33.7 G/DL (ref 31–37)
MCV RBC AUTO: 93.2 FL (ref 80–100)
MONOCYTES NFR BLD: 0.5 K/UL (ref 0.1–1.2)
MONOCYTES NFR BLD: 6 % (ref 2–11)
NEUTROPHILS NFR BLD: 79 % (ref 36–66)
NEUTS SEG NFR BLD: 6.5 K/UL (ref 1.8–7.7)
PLATELET # BLD AUTO: 296 K/UL (ref 140–450)
PMV BLD AUTO: 7.9 FL (ref 6–12)
POTASSIUM SERPL-SCNC: 3.6 MMOL/L (ref 3.7–5.3)
PROT SERPL-MCNC: 6.2 G/DL (ref 6.4–8.3)
RBC # BLD AUTO: 3.51 M/UL (ref 4.5–5.9)
SODIUM SERPL-SCNC: 137 MMOL/L (ref 135–144)
WBC OTHER # BLD: 8.3 K/UL (ref 3.5–11)

## 2024-02-08 PROCEDURE — 99211 OFF/OP EST MAY X REQ PHY/QHP: CPT | Performed by: INTERNAL MEDICINE

## 2024-02-08 PROCEDURE — 3079F DIAST BP 80-89 MM HG: CPT | Performed by: INTERNAL MEDICINE

## 2024-02-08 PROCEDURE — 1123F ACP DISCUSS/DSCN MKR DOCD: CPT | Performed by: INTERNAL MEDICINE

## 2024-02-08 PROCEDURE — 36415 COLL VENOUS BLD VENIPUNCTURE: CPT

## 2024-02-08 PROCEDURE — 3077F SYST BP >= 140 MM HG: CPT | Performed by: INTERNAL MEDICINE

## 2024-02-08 PROCEDURE — 80053 COMPREHEN METABOLIC PANEL: CPT

## 2024-02-08 PROCEDURE — 85025 COMPLETE CBC W/AUTO DIFF WBC: CPT

## 2024-02-08 PROCEDURE — 99214 OFFICE O/P EST MOD 30 MIN: CPT | Performed by: INTERNAL MEDICINE

## 2024-02-08 NOTE — TELEPHONE ENCOUNTER
NO NOTE AT TIME OF CHECKOUT  **PT VERBALIZED INSTRUCTIONS OF A 6 MONTH F/U WITH LABS      Rv scheduled for 8/22/24 @ 9:00am with cbc drawn at md visit    PT was given AVS and appt schedule    Electronically signed by Elena Fox on 2/8/2024 at 9:58 AM

## 2024-02-08 NOTE — PROGRESS NOTES
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    He reports that he was seen by his PCP in October 2013, for routine follow up visit. His Lab work at that time revealed low WBC 3.0K. So his lab work was repeated by his PCP in  Jan 2014. Which again revealed WBC 2.6 and ANC of 0.5, so he was referred to our hematology clinic for further evaluation and recommendations.  His WBC have been stable for last few months, however his iron levels were mildly low so I started him on PO iron.    INTERVAL HISTORY:  Sonido is returning for follow visit and to discuss lab results and further recommendations.  He recently had a fall and hurt his right arm and had Right humerus fracture and he had Reverse Shoulder Arthroplasty on 1/30/24  He is on methotrexate and tolerating well denies any chest pain shortness of breath.  Denies any abdominal pain nausea vomiting.  His hemoglobin slightly dropped       During this visit patient's allergy, social, medical, surgical history and medications were reviewed and updated.      Current Outpatient Medications   Medication Sig Dispense Refill    HYDROcodone-acetaminophen (NORCO) 5-325 MG per tablet Take 1 tablet by mouth every 6 hours as needed for Pain for up to 5 days. Intended supply: 7 days. Take lowest dose possible to manage pain Max Daily Amount: 4 tablets 20 tablet 0    levothyroxine (SYNTHROID) 125 MCG tablet Take 1 tablet by mouth daily 90 tablet 3    atorvastatin (LIPITOR) 10 MG tablet Take 1 tablet by mouth daily 90 tablet 3    omeprazole (PRILOSEC) 40 MG

## 2024-02-14 ENCOUNTER — OFFICE VISIT (OUTPATIENT)
Dept: ORTHOPEDIC SURGERY | Age: 74
End: 2024-02-14

## 2024-02-14 VITALS — RESPIRATION RATE: 14 BRPM | HEIGHT: 65 IN | BODY MASS INDEX: 29.49 KG/M2 | WEIGHT: 177 LBS

## 2024-02-14 DIAGNOSIS — Z96.611 S/P REVERSE TOTAL SHOULDER ARTHROPLASTY, RIGHT: ICD-10-CM

## 2024-02-14 DIAGNOSIS — M25.511 RIGHT SHOULDER PAIN, UNSPECIFIED CHRONICITY: Primary | ICD-10-CM

## 2024-02-14 PROCEDURE — 99024 POSTOP FOLLOW-UP VISIT: CPT

## 2024-02-14 NOTE — PROGRESS NOTES
Procedure: Right Reverse Total Shoulder Arthroplasty  Date of Procedure: 1/30/2024    HPI: Sonido Willard is approximately 2 weeks status post the aforementioned procedure. He is doing relatively well clinically.  His pain is appropriately controlled with Tylenol. He has been compliant with his immobilizer and pendulum exercises. He denies having any fevers, chills, sweats, or any constitutional symptoms.  Patient states that his pain is well-controlled at this time, he does state he has some soreness in the forearm.    Physical examination:  There were no vitals taken for this visit.  General Appearance: alert, well appearing, and in no distress  Mental Status: alert, oriented to person, place, and time    Evaluation of the right shoulder and upper extremity demonstrates his shoulder incision to be clean, dry, and intact. No wound dehiscence or active drainage is appreciated.  There was presence of some scabbing/abrasion around the border where they have been putting tape for dressing changes. There is mild to moderate amount of swelling present. Sensation is grossly intact light touch in all dermatomes and he has a 2+ radial pulse with brisk capillary refill in all his fingers. he can actively flex and extend the wrist and flex, extend, abduct, and adduct all of his fingers.     Imaging Studies: X-rays of the right shoulder completed on 2/14/2024 were independently reviewed demonstrating a right reverse total shoulder implant to be in acceptable alignment and well fixed without any obvious fracture, dislocation, or subluxation.    Impression and plan: Sonido Willard is approximately 2 weeks status post a right reverse total shoulder arthroplasty. He is doing relatively well clinically at this time. Sutures were taken out today and Steri-Strips and clean dressings applied. He is to continue with daily dressing changes for the next week, his Steri-Strips that were applied in office today should fall off on their own

## 2024-03-11 ENCOUNTER — OFFICE VISIT (OUTPATIENT)
Dept: ORTHOPEDIC SURGERY | Age: 74
End: 2024-03-11

## 2024-03-11 ENCOUNTER — OFFICE VISIT (OUTPATIENT)
Dept: INTERNAL MEDICINE CLINIC | Age: 74
End: 2024-03-11
Payer: COMMERCIAL

## 2024-03-11 VITALS
BODY MASS INDEX: 29.45 KG/M2 | WEIGHT: 177 LBS | OXYGEN SATURATION: 96 % | SYSTOLIC BLOOD PRESSURE: 132 MMHG | HEART RATE: 71 BPM | DIASTOLIC BLOOD PRESSURE: 80 MMHG

## 2024-03-11 VITALS — BODY MASS INDEX: 29.49 KG/M2 | HEIGHT: 65 IN | RESPIRATION RATE: 14 BRPM | WEIGHT: 177 LBS

## 2024-03-11 DIAGNOSIS — R73.03 PREDIABETES: ICD-10-CM

## 2024-03-11 DIAGNOSIS — Z96.611 S/P REVERSE TOTAL SHOULDER ARTHROPLASTY, RIGHT: Primary | ICD-10-CM

## 2024-03-11 DIAGNOSIS — N52.9 ERECTILE DYSFUNCTION, UNSPECIFIED ERECTILE DYSFUNCTION TYPE: ICD-10-CM

## 2024-03-11 DIAGNOSIS — D64.89 ANEMIA DUE TO OTHER CAUSE, NOT CLASSIFIED: ICD-10-CM

## 2024-03-11 DIAGNOSIS — Z12.5 SCREENING FOR PROSTATE CANCER: Primary | ICD-10-CM

## 2024-03-11 PROCEDURE — 1123F ACP DISCUSS/DSCN MKR DOCD: CPT | Performed by: INTERNAL MEDICINE

## 2024-03-11 PROCEDURE — 3075F SYST BP GE 130 - 139MM HG: CPT | Performed by: INTERNAL MEDICINE

## 2024-03-11 PROCEDURE — 3079F DIAST BP 80-89 MM HG: CPT | Performed by: INTERNAL MEDICINE

## 2024-03-11 PROCEDURE — 99024 POSTOP FOLLOW-UP VISIT: CPT | Performed by: ORTHOPAEDIC SURGERY

## 2024-03-11 PROCEDURE — 99214 OFFICE O/P EST MOD 30 MIN: CPT | Performed by: INTERNAL MEDICINE

## 2024-03-11 RX ORDER — SILDENAFIL 50 MG/1
50 TABLET, FILM COATED ORAL PRN
Qty: 6 TABLET | Refills: 0 | Status: SHIPPED | OUTPATIENT
Start: 2024-03-11

## 2024-03-11 NOTE — PROGRESS NOTES
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 He reports that he was seen by his PCP in October 2013, for routine follow up visit. His Lab work at that time revealed low WBC 3.0K. So his lab work was repeated by his PCP in  Jan 2014. Which again revealed WBC 2.6 and ANC of 0.5, so he was referred to our hematology clinic for further evaluation and recommendations. His WBC have been stable for last few months, however his iron levels were mildly low so I started him on PO iron. INTERVAL HISTORY:  Grzegorz Triplett is returning for follow-up visit and to discuss labs and further recommendations. He is on methotrexate and tolerating well. Currently he appears to be in hematologic remission. His WBC is, hemoglobin and platelets are within normal limits. He denies any side effects from methotrexate. He denies any unintentional weight loss, drenching night sweats, swallowing difficulty or fever chills. During this visit patient's allergy, social, medical, surgical history and medications were reviewed and updated.       Current Outpatient Medications   Medication Sig Dispense Refill    methotrexate (RHEUMATREX) 2.5 MG chemo tablet TAKE 8 TABLETS BY MOUTH ONCE A WEEK 32 tablet 0    OneTouch Delica Lancets 58S MISC USE TO TEST BLOOD SUGAR THREE TO FOUR TIMES DAILY AND AS NEEDED 200 each 0    levothyroxine (SYNTHROID) 150 MCG tablet TAKE 1 TABLET DAILY EXCEPT ON MONDAY AND THURSDAY TAKE1 AND 1/2 TABLETS 104 tablet 1    enalapril (VASOTEC) 20 MG tablet TAKE 1 TABLET TWICE A  tablet 1  hydroCHLOROthiazide (HYDRODIURIL) 25 MG tablet TAKE 1 TABLET ONCE DAILY 90 tablet 1    omeprazole (PRILOSEC) 40 MG delayed release capsule TAKE 1 CAPSULE DAILY 90 capsule 1    atorvastatin (LIPITOR) 10 MG tablet TAKE 1 TABLET DAILY 90 tablet 3    blood glucose test strips (ONETOUCH ULTRA) strip USE 1 STRIP TO CHECK GLUCOSE  each 2    sildenafil (VIAGRA) 50 MG tablet Take 1-2 tablets by mouth daily as needed for Erectile Dysfunction 30 tablet 0    metFORMIN (GLUCOPHAGE) 500 MG tablet TAKE 1 TABLET TWICE DAILY  WITH MEALS 180 tablet 3    hydrocortisone 2.5 % ointment Apply topically 2 times daily. 28.35 g 0    fluticasone (FLONASE) 50 MCG/ACT nasal spray 2 sprays by Nasal route daily (Patient taking differently: 2 sprays by Nasal route daily Using PRN) 1 Bottle 3     No current facility-administered medications for this visit. REVIEW OF SYSTEM:     Constitutional: No fever or chills. No night sweats, no weight loss, some fatigue late during the day  Eyes: No eye discharge, double vision, or eye pain   HEENT: negative for sore mouth, sore throat, hoarseness and voice change   Respiratory: negative for cough , sputum, dyspnea, wheezing, hemoptysis, chest pain   Cardiovascular: negative for chest pain, dyspnea, palpitations, orthopnea, PND   Gastrointestinal: negative for nausea, vomiting, diarrhea, constipation, abdominal pain, Dysphagia, hematemesis and hematochezia, +rectal pain.   Genitourinary: negative for frequency, dysuria, nocturia, urinary incontinence, and hematuria   Integument: ++ Edematous macular skin rash over torso, upper and lower extremity  Hematologic/Lymphatic: negative for easy bruising, bleeding, lymphadenopathy, petechiae and swelling/edema   Endocrine: negative for heat or cold intolerance, tremor, weight changes, change in bowel habits and hair loss   Musculoskeletal: negative for myalgias, arthralgias, pain, joint swelling,and bone pain   Neurological: negative for headaches, dizziness, seizures, weakness, numbness     OBJECTIVE:         Vitals:    06/15/21 1122   BP: 135/84   Pulse: 81   Resp: 18   Temp: 98.9 °F (37.2 °C)      PHYSICAL EXAM:   General appearance - well appearing, no in pain or distress   Mental status - alert and cooperative   Eyes - pupils equal and reactive, extraocular eye movements intact   Ears - bilateral TM's and external ear canals normal   Mouth - mucous membranes moist, pharynx normal without lesions   Neck - supple, no significant adenopathy   Lymphatics - no palpable lymphadenopathy, no hepatosplenomegaly   Chest - clear to auscultation, no wheezes, rales or rhonchi, symmetric air entry   Heart - normal rate, regular rhythm, normal S1, S2, no murmurs, rubs, clicks or gallops   Abdomen - soft, nontender, nondistended, no masses or organomegaly   Neurological - alert, oriented, normal speech, no focal findings or movement disorder noted   Musculoskeletal - no joint tenderness, deformity or swelling   Extremities - peripheral pulses normal, no pedal edema, no clubbing or cyanosis   Skin - ++ Edematous macular skin rash over torso, upper and lower extremity    LABORATORY DATA:     Lab Results   Component Value Date    WBC 7.7 06/03/2021    HGB 14.2 06/03/2021    HCT 43.1 06/03/2021    MCV 92.7 06/03/2021     06/03/2021       Chemistry        Component Value Date/Time     06/03/2021 0756    K 3.9 06/03/2021 0756     06/03/2021 0756    CO2 26 06/03/2021 0756    BUN 12 06/03/2021 0756    CREATININE 0.76 06/03/2021 0756        Component Value Date/Time    CALCIUM 9.2 06/03/2021 0756    ALKPHOS 94 06/03/2021 0756    AST 21 06/03/2021 0756    ALT 30 06/03/2021 0756    BILITOT 0.36 06/03/2021 0756        PATHOLOGY DATA:   Final Diagnosis  SPECIMEN \"A\": DUODENUM, BIOPSY:    DUODENAL TYPE MUCOSA DEMONSTRATING NO SIGNIFICANT HISTOPATHOLOGIC  FEATURES    THE FEATURES OF SPRUE ARE NOT IDENTIFIED   SPECIMEN \"B\": LOWER RIGHT COLON, BIOPSY:  TUBULAR ADENOMA   SPECIMEN \"C\": ILEOCECAL VALVE, BIOPSY:    BENIGN SMALL INTESTINAL TYPE MUCOSA WITH FEATURES OF  PSEUDOLIPOMATOSIS  SCANT FRAGMENT OF BENIGN FIBROADIPOSE TISSUE     Final Diagnosis   7/30/18 LEFT POSTERIOR ILIAC CREST, BONE NEEDLE BIOPSY, SEDIMENT AND REED   STAINED SMEARS OF BONE MARROW ASPIRATE SHOWING:         PATCHY MARKED HYPERCELLULARITY WITH PRESENCE OF IRON STORES     RELATIVE LYMPHOCYTOSIS (46%) WITH T-LYMPHOCYTE HYPERPLASIA     ERYTHROID HYPERPLASIA (ME RATIO IS 1.2:1)     MARKED DECREASE IN BANDS AND SEGMENTED NEUTROPHILS (5 AND 1% RESPECTIVELY)     MEGAKARYOCYTES ARE PRESENT     NO FOREIGN CELLS OR GRANULOMATA IDENTIFIED   ADDENDUM COMMENT   Additional immunostains are performed (CD4, CD8, CD5 and CD7).  These   immunostains fail to demonstrate significant aberrant staining (controls adequate).  There is a slight increased number of CD8 positive T-cells as compared to CD4 positive cells.  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 This is of uncertain clinical significance. A portion of the bone marrow was sent to [facility] for cytogenetic studies including FISH. [facility] results are as follows. (Please see their separate report [id number] for complete results). Modal # of Chromosomes:     46          No. of Cells Counted:     20   Staining Method:          GTG;FISH     No. of Cells Analyzed:     20   No. and/or Type of Cultures:     24MF;48CM     Hypomodal Cells:     2   No. of Karyograms:     6          Hypermodal Cells:     1     Karyotype:   46,XY[20]. nuc   tiffany(D5S23,EGR1)x2[200],(CEP7,U6C189)x2[200],(H7D133,CEP8)x2[200],   (TEL20p,L47A120)x2[200]     Cytogenetic Diagnosis   A.     Normal male chromosome complement   B.      FISH: No evidence of -5/del(5q), -7/del(7q) or -20/del(20q)   C.      FISH: No evidence of +8     Interpretation:   Cytogenetic examination of twenty metaphase cells showed a normal karyotype with no consistent numerical or structural chromosome   aberrations observed.       Fluorescence in situ hybridization (FISH) analysis was performed using   1) the LSI 5q EGR1 (5q31, [employer])/D5S23,M7F833 (5p15.2,   SpectrumGreen) Dual Color DNA probe set ([employer]) which detects deletions of 5q31 containing the EGR1 locus and can be used to identify loss of chromosome 5; 2) the LSI V4F388 (7q31, SpectrumOrange)/CEP7 (7 centromere, SpectrumGreen) Dual Color DNA probe set (Vysis) which detects deletions of 7q31 and may be used to identify loss of chromosome 7;  3) the LSI CEP 8 (SpectrumOrange)/K2Y829 (8p telomere, SpectrumGreen) probe combination which allows enumeration of chromosome 8 centromere and may be used to detect gain or loss of chromosome 8; and 4) the 20p TEL (20p13, SpectrumGreen)/LSI T39C169 (20q12, SpectrumOrange) probe combination which detects deletion of 20q. Examination of 200 interphase nuclei with each probe revealed normal hybridization patterns. Please note that this analysis does not eliminate the possibility of single gene defects, chromosomal mosaicism involving abnormal cell lines of low frequency, small structural chromosome abnormalities, or failure to sample any malignant clone(s) that may be present.     FLOW CYTOMETRIC ANALYSIS OF PERIPHERAL BLOOD PERFORMED AT [email or web address] (T-CELL CLONALITY BY FLOW CYTOMETRY OF TCR V-BETA)   REPORTS NO EVIDENCE OF A MONOCLONAL T-CELL POPULATION.      BONE MARROW, ASPIRATE, BIOPSY, AND CLOT SECTIONS DEMONSTRATING:          HYPERCELLULAR HEMATOPOIETIC MARROW (80%) WITH INCREASED   T-LYMPHOCYTES (20-40%)     FLOW CYTOMETRIC ANALYSIS OF THE BONE MARROW FOR T-CELL CLONALITY   PERFORMED AT [email or web address] (T-CELL CLONALITY BY FLOW CYTOMETRY TCR   V-BETA) REPORTS NO EVIDENCE OF A MONOCLONAL T-CELL POPULATION   IMAGING DATA:    Reviewed  PET scan 8/27/18  Impression   1. No evidence of metabolically active lymphadenopathy.    2. Mild generalized uptake within the osseous structures without   corresponding CT abnormality as well as splenomegaly which are likely due to   history of  immunoproliferative disease. 3. Focal uptake in the right perianal region with associated mild soft tissue   prominence, please correlate with direct physical examination. 4. Chronic findings as described including cholelithiasis. ASSESSMENT:    Mr Charles Sanz is 72 y.o.  YOM with chronic neutropenia. Other work-up including at [facility] clinic suggested adult T-cell lymphoma. He was started on methotrexate and so far responded very well. Neutropenia resolved. His fatigue has improved  Patient's questions were sought and answered to his satisfaction. He agreed with the plan. PLAN:   I reviewed recent lab work with patient and discussed the treatment plan   Based on history, physical examination and lab work no evidence of recurrence   Plan to continue methotrexate as planned   Return to clinic in 6 months with labs prior     I spent more than 20 minutes examining, evaluating, reviewing data and counseling the patient. Greater than 50% of that time was spent face-to-face with the patient in counseling and coordinating her care.     Bear Narayanan MD  Hematology/Oncology    CC: Ana María Lopez, MELISSA - CNP fall/impaired judgment

## 2024-03-11 NOTE — PROGRESS NOTES
Procedure: Right reverse shoulder arthroplasty for fracture  Date of procedure: 1/30/24    HPI: Mr. Willard is a 73-year-old gentleman who is approximately 6 weeks status post the aforementioned procedure.  He indicates that he is doing relatively well.  He still rates his pain as a 5/10.  He is kept up with his home exercise program i.e. pendulum exercises as well as sling wear.    Physical examination: Evaluation of the patient's right shoulder and upper extremity demonstrates his incision to be clean, dry, and intact and appropriately healed.  Sensation is grossly intact light touch in all dermatomes and he has a 2+ radial pulse with brisk cap refill in his fingers.  No crepitation with gentle range of motion through the shoulder.    Imaging studies: 4 x-ray views of the right shoulder completed on 3/11/2024 reviewed independently demonstrating a reverse shoulder arthroplasty in acceptable alignment.  It appears to be well-fixed.  No dislocation or subluxation.  Tuberosity fragments appear to be in acceptable alignment without evidence of migration.    Impression and plan: Mr. Willard is a 73-year-old gentleman approximately 6 weeks status post a right reverse shoulder arthroplasty for fracture.  He is doing fairly well at this time.  Today he was encouraged to wean out of the sling and start using this arm for light activities of daily living limiting any lifting pushing or pulling to 1 to 2 pounds.  A prescription for physical therapy was provided to begin working on active and active assisted range of motion.  No strengthening at this time.  I will see him back for reevaluation in 6 weeks but he was encouraged to return or call earlier with questions and or concerns

## 2024-03-13 NOTE — PROGRESS NOTES
[facility]  [address] [address]  Dept: [phone]  Dept Fax: [phone]    Office Progress/Follow Up Note  Date of patient's visit: 3/13/2024  Patient's Name:  Sonido Willard YOB: 1950            Patient Care Team:  Giovanna Bowers MD as PCP - General (Internal Medicine)  Jimmy Nair MD as PCP - Internal Medicine (Otolaryngology)  Giovanna Bowers MD as PCP - Empaneled Provider  Kvng Mccauley MD as Consulting Physician (Gastroenterology)  Ignacio Narayanan MD as Consulting Physician (Hematology and Oncology)  Nabila Diaz RN as Registered Nurse (Oncology)    REASON FOR VISIT: Preop visit    HISTORY OF PRESENT ILLNESS:      Chief Complaint   Patient presents with    Follow-up     6 week f/u        History was obtained from the patient. Sonido Willard is a 73 y.o. is here for a preoperative visit.    The patient had a mechanical fall a week back, broke his right humerus and planned for ORIF tomorrow.  Apart from pain in the right upper extremity obese the patient is wearing a sling, pain on the right anterior aspect of his torso where he fell he reports having swelling in the right arm, no pain more distal to the elbow.    No fevers no chills, reported constipation however was able to have a bowel movement earlier today nonbloody/melanotic.    Patient denies any history of CAD, can walk 2 blocks without getting short of breath or without chest pain.  He reports that it is right knee pain that causes him to stop.  He can climb a flight of stairs with no difficulty.      Patient Active Problem List   Diagnosis    Essential hypertension    Hypothyroidism    Neutropenia (HCC)    Iron deficiency    History of colon polyps    Type 2 diabetes mellitus without complication, without long-term current use of insulin (HCC)    Prominent ileocecal valve    Colon polyps    Pharyngitis    Lymphoid hyperplasia    Malignant immunoproliferative

## 2024-03-18 ENCOUNTER — HOSPITAL ENCOUNTER (OUTPATIENT)
Dept: PHYSICAL THERAPY | Age: 74
Setting detail: THERAPIES SERIES
Discharge: HOME | End: 2024-03-18
Payer: COMMERCIAL

## 2024-03-18 PROCEDURE — 97161 PT EVAL LOW COMPLEX 20 MIN: CPT

## 2024-03-18 PROCEDURE — 97110 THERAPEUTIC EXERCISES: CPT

## 2024-03-18 NOTE — CONSULTS
[facility] Fall Risk Assessment    Patient Name:  Sonido Willard  : 1950    Risk Factor Scale  Score   History of Falls [] Yes  [x] No 25  0 0   Secondary Diagnosis [] Yes  [x] No 15  0 0   Ambulatory Aid [] Furniture  [] Crutches/cane/walker  [x] None/bedrest/wheelchair/nurse 30  15  0 0   IV/Heparin Lock [] Yes  [x] No 20  0 0   Gait/Transferring [] Impaired  [] Weak  [x] Normal/bedrest/immobile 20  10  0 0   Mental Status [] Forgets limitations  [x] Oriented to own ability 15  0 0      Total:  0     Based on the Assessment score: check the appropriate box.    [x]  No intervention needed   Low =   Score of 0-24    []  Use standard prevention interventions Moderate =  Score of 24-44   [] Give patient handout and discuss fall prevention strategies   [] Establish goal of education for patient/family RE: fall prevention strategies    []  Use high risk prevention interventions High = Score of 45 and higher   [] Give patient handout and discuss fall prevention strategies   [] Establish goal of education for patient/family Re: fall prevention strategies   [] Discuss lifeline / other resources    Electronically signed by:   Osiel Torres PT  Date: 3/18/2024      
exercise program as demonstrated by performance with correct form without cues.    LTG: (to be met in 36 treatments)   Improve right shoulder ROM to wfl to facilitate yard work and driving  Improve right shoulder strength to wfl to facilitate cleaning house  Improve SPADI score to 20% impaired at worst to facilitate yard work    Patient goals:  Decrease pain;  improve use of shoulder    Rehab Potential:  [x] Good  [] Fair  [] Poor   Suggested Professional Referral:  [x] No  [] Yes:  Barriers to Goal Achievement:  [x] No  [] Yes:  Domestic Concerns:  [x] No  [] Yes:    Pt. Education:  [x] Plans/Goals, Risks/Benefits discussed  [x] Home exercise program  Method of Education: [x] Verbal  [x] Demo  [x] Written  Comprehension of Education:  [x] Verbalizes understanding.  [] Demonstrates understanding.  [x] Needs Review.  [] Demonstrates/verbalizes understanding of HEP/Ed previously given.    Access Code: [email or web address]  URL: [email or web address]  Date: 03/18/2024  Prepared by: Osiel Torres    Exercises  - Standing Shoulder Shrugs  - 3 x daily - 7 x weekly - 1 sets - 10 reps  - Seated Scapular Retraction  - 3 x daily - 7 x weekly - 1 sets - 10 reps  - Circular Shoulder Pendulum with Table Support  - 3 x daily - 7 x weekly - 1 sets - 10 reps  - Standing Bent Over Single Arm Scapular Row with Table Support  - 3 x daily - 7 x weekly - 1 sets - 10 reps  - Standing Elbow Flexion Extension AROM  - 3 x daily - 7 x weekly - 1 sets - 10 reps  - Supine Shoulder Flexion AAROM with Hands Clasped  - 2 x daily - 7 x weekly - 1 sets - 10 reps  - Seated Shoulder Flexion Towel Slide at Table Top  - 2 x daily - 7 x weekly - 1 sets - 10 reps    Treatment Plan:  [x] Therapeutic Exercise   22427  [] Iontophoresis: 4 mg/mL Dexamethasone Sodium Phosphate  mAmin  40180   [] Therapeutic Activity  72881 [] Vasopneumatic cold with compression  86026    [] Gait Training   78816 [] Ultrasound   09418   [x] Neuromuscular Re-education  92521 []

## 2024-03-22 ENCOUNTER — HOSPITAL ENCOUNTER (OUTPATIENT)
Dept: PHYSICAL THERAPY | Age: 74
Setting detail: THERAPIES SERIES
Discharge: HOME | End: 2024-03-22
Payer: COMMERCIAL

## 2024-03-22 PROCEDURE — 97110 THERAPEUTIC EXERCISES: CPT

## 2024-03-22 PROCEDURE — 97140 MANUAL THERAPY 1/> REGIONS: CPT

## 2024-03-22 NOTE — FLOWSHEET NOTE
[facility]   Outpatient Rehabilitation & Therapy  [address] [facility] [address]  P: [phone]   F: [phone]    Physical Therapy Daily Treatment Note      Date:  3/22/2024  Patient Name:  Sonido Willard    :  1950  MRN: [id number]  Physician: Cathi Armstrong MD                                Insurance: [employer];    AUTH REQUIRED AFTER 12 VISITS   Medical Diagnosis: Z96.611 (ICD-10-CM) - S/P reverse total shoulder arthroplasty, right; Right shoulder A/AAROM.  No strengthening at this time                     Rehab Codes: M25.511, M25.611, M62.511  Onset Date: 24                                  Next 's appt: 24  Visit# / total visits:   Cancels/No Shows: 0/0    Subjective:  Patient arrived and states he was really sore after initial session but also thinks he over did it in HEP the next day so opted to rest and ice yesterday to allow soreness levels to dwindle.   Pain:  [x] Yes  [] No Location: R shoulder anterior/lateral Pain Rating: (0-10 scale) 6/10  Pain altered Tx:  [] No  [] Yes  Action:    Objective:  Modalities: ice pack applied to R shoulder in sitting 7' post exercise  Precautions: Work on A/AAROM; No strengthening yet  (per initial script);  Reverse TSA Right 24  Exercises:  Exercise Reps/ Time Weight/ Level Comments Completed today    Manual          STM  10'    anterior shoulder, lateral deltoid  x            Supine        PROM  20x ea    All planes  x   AAROM flex/abd/ER  20x ea dowel Gil   x            Seated pulleys  10x5\"   Flex/scaption  x   Seated scap squeeze  10x5\"   x   Seated posterior shoulder roll  10x   x          Standing ball rolls on mat table  10x5\" hold  Yellow ball  Flex/add/abd  x              Specific Instructions for next treatment: Review previous treatment and response to HEP;  Advance as able AROM/AAROM         Assessment:  3/22: initial minutes spent with manual intervention to lesson overall muscle tightness in R anterior and

## 2024-03-27 ENCOUNTER — HOSPITAL ENCOUNTER (OUTPATIENT)
Dept: PHYSICAL THERAPY | Age: 74
Setting detail: THERAPIES SERIES
Discharge: HOME | End: 2024-03-27
Payer: COMMERCIAL

## 2024-03-27 PROCEDURE — 97140 MANUAL THERAPY 1/> REGIONS: CPT

## 2024-03-27 PROCEDURE — 97110 THERAPEUTIC EXERCISES: CPT

## 2024-03-27 NOTE — FLOWSHEET NOTE
[facility]   Outpatient Rehabilitation & Therapy  [address] [facility] [address]  P: [phone]   F: [phone]    Physical Therapy Daily Treatment Note      Date:  3/27/2024  Patient Name:  Sonido Willard    :  1950  MRN: [id number]  Physician: Cathi Armstrong MD                                Insurance: [employer];    AUTH REQUIRED AFTER 12 VISITS   Medical Diagnosis: Z96.611 (ICD-10-CM) - S/P reverse total shoulder arthroplasty, right; Right shoulder A/AAROM.  No strengthening at this time                     Rehab Codes: M25.511, M25.611, M62.511  Onset Date: 24                                  Next 's appt: 24  Visit# / total visits: 3/36  Cancels/No Shows: 0/0    Subjective:  Patient arrived and states he was really sore after initial session but also thinks he over did it in HEP the next day so opted to rest and ice yesterday to allow soreness levels to dwindle.   Pain:  [x] Yes  [] No Location: R shoulder anterior/lateral Pain Rating: (0-10 scale) 6/10  Pain altered Tx:  [x] No  [] Yes  Action:    Objective:  Modalities: ice pack applied to R shoulder in sitting 7' post exercise  Precautions: Work on A/AAROM; No strengthening yet  (per initial script);  Reverse TSA Right 24  Exercises:  Exercise Reps/ Time Weight/ Level Comments Completed today    Manual          STM  10'    anterior shoulder, lateral deltoid  x            Supine        PROM  20x ea    All planes  x   AAROM flex/abd/ER  20x ea dowel Gil   x   Supine 2 hands clasped shld flex                       Seated pulleys  10x5\"   Flex/scaption  x   Seated scap squeeze  10x5\"   x   Seated posterior shoulder roll  10x   x          Standing towel scrubs 10x5\" hold   Flex/abd  x   Pendulum circles 10-12 each      Bent Over Rows 10x      Cane shoulder abd                                Specific Instructions for next treatment: Review previous treatment and response to HEP;  Advance as able AROM/AAROM      Right  ROM

## 2024-04-01 ENCOUNTER — HOSPITAL ENCOUNTER (OUTPATIENT)
Dept: PHYSICAL THERAPY | Age: 74
Setting detail: THERAPIES SERIES
Discharge: HOME | End: 2024-04-01
Payer: COMMERCIAL

## 2024-04-01 PROCEDURE — 97110 THERAPEUTIC EXERCISES: CPT

## 2024-04-01 NOTE — FLOWSHEET NOTE
Reviewed Prior HEP/Ed  Method of Education: [x] Verbal  [x] Demo  [] Written  Comprehension of Education:  [x] Verbalizes understanding.  [] Demonstrates understanding.  [x] Needs review.  [] Demonstrates/verbalizes HEP/Ed previously given.     Access Code: [email or web address]  URL: [email or web address]  Date: 03/18/2024  Prepared by: Osiel Torres     Exercises  - Standing Shoulder Shrugs  - 3 x daily - 7 x weekly - 1 sets - 10 reps  - Seated Scapular Retraction  - 3 x daily - 7 x weekly - 1 sets - 10 reps  - Circular Shoulder Pendulum with Table Support  - 3 x daily - 7 x weekly - 1 sets - 10 reps  - Standing Bent Over Single Arm Scapular Row with Table Support  - 3 x daily - 7 x weekly - 1 sets - 10 reps  - Standing Elbow Flexion Extension AROM  - 3 x daily - 7 x weekly - 1 sets - 10 reps  - Supine Shoulder Flexion AAROM with Hands Clasped  - 2 x daily - 7 x weekly - 1 sets - 10 reps  - Seated Shoulder Flexion Towel Slide at Table Top  - 2 x daily - 7 x weekly - 1 sets - 10 reps    Plan: [x] Continue per plan of care.   [] Other:      Treatment Charges: Mins Units   []  Modalities  ICE 10 1   [x]  Ther Exercise 40 3   []  Manual Therapy     []  Ther Activities     []  Aquatics     []  Neuromuscular     [] Vasocompression     [] Gait Training     [] Dry needling        [] 1 or 2 muscles        [] 3 or more muscles     []  Other ice pack      Total Billable time 40 3     Time In: 4:49 pm             Time Out: 5:45 pm     Electronically signed by:  Osiel Torres, PT

## 2024-04-03 ENCOUNTER — HOSPITAL ENCOUNTER (OUTPATIENT)
Dept: PHYSICAL THERAPY | Age: 74
Setting detail: THERAPIES SERIES
Discharge: HOME | End: 2024-04-03
Payer: COMMERCIAL

## 2024-04-03 PROCEDURE — 97110 THERAPEUTIC EXERCISES: CPT

## 2024-04-03 NOTE — FLOWSHEET NOTE
[facility]   Outpatient Rehabilitation & Therapy  [address] [facility] [address]  P: [phone]   F: [phone]    Physical Therapy Daily Treatment Note      Date:  4/3/2024  Patient Name:  Sonido Willard    :  1950  MRN: [id number]  Physician: Cathi Armstrong MD                                Insurance: [employer];    AUTH REQUIRED AFTER 12 VISITS   Medical Diagnosis: Z96.611 (ICD-10-CM) - S/P reverse total shoulder arthroplasty, right; Right shoulder A/AAROM.  No strengthening at this time                     Rehab Codes: M25.511, M25.611, M62.511  Onset Date: 24                                  Next 's appt: 24  Visit# / total visits:   ( approved)  Cancels/No Shows: 0/0    Subjective:  Patient reports he is still sore.  He reports shoulder keeps him from sleeping well.  He reports he can move the arm more but it is still very sore.  He is trying to use the right arm to wash his hair but is not able to do that yet.  Pain:  [x] Yes  [] No Location: R shoulder anterior/lateral Pain Rating: (0-10 scale) 8/10  Pain altered Tx:  [x] No  [] Yes  Action:    Objective:  Modalities: ice pack applied to R shoulder in supine 10' post exercise  (Pt deferred ice on 4/3/24)  Precautions: Work on A/AAROM; No strengthening yet  (per initial script);  Reverse TSA Right 24  Exercises:  Exercise Reps/ Time Weight/ Level Comments Completed today    Manual          STM                 Supine        PROM  20x ea    All planes  x   AAROM flex/abd/ER  20x ea dowel Gil   x   Supine 2 hands clasped shld flex 15x   x   Supine shld Abd/add 10x  Mod A x                        Side Lying shld Abd and flex 10x  each  Abd Max A  Flex   Mod A x                          Seated pulleys  15x5\"   Flex/scaption  x   Seated scap squeeze  10x5\"   x   Seated posterior shoulder roll  10x   x          Standing towel scrubs 10x5\"   Flex/abd  x   Pendulum circles 15 each   x   Bent Over Rows 10x   x   Cane

## 2024-04-08 ENCOUNTER — HOSPITAL ENCOUNTER (OUTPATIENT)
Dept: PHYSICAL THERAPY | Age: 74
Setting detail: THERAPIES SERIES
Discharge: HOME | End: 2024-04-08
Payer: COMMERCIAL

## 2024-04-08 PROCEDURE — 97110 THERAPEUTIC EXERCISES: CPT

## 2024-04-08 NOTE — FLOWSHEET NOTE
[facility]   Outpatient Rehabilitation & Therapy  [address] [facility] [address]  P: [phone]   F: [phone]    Physical Therapy Daily Treatment Note      Date:  2024  Patient Name:  Sonido Willard    :  1950  MRN: [id number]  Physician: Cathi Armstrong MD                                Insurance: [employer];    AUTH REQUIRED AFTER 12 VISITS   Medical Diagnosis: Z96.611 (ICD-10-CM) - S/P reverse total shoulder arthroplasty, right; Right shoulder A/AAROM.  No strengthening at this time                     Rehab Codes: M25.511, M25.611, M62.511  Onset Date: 24                                  Next 's appt: 24  Visit# / total visits:   ( approved)  Cancels/No Shows: 0/0    Subjective:  Patient reports shoulder is less sore but still painful.  He reports right knee is very sore today but he denies recent trauma.  He nots he is trying to use right UE more at home but he is still very limited  Pain:  [x] Yes  [] No Location: R shoulder anterior/lateral Pain Rating: (0-10 scale) 6/10  Pain altered Tx:  [x] No  [] Yes  Action:    Objective:  Modalities: ice pack applied to R shoulder in supine 10' post exercise  (Pt deferred ice on 4/3/24)  Precautions: Work on A/AAROM; No strengthening yet  (per initial script);  Reverse TSA Right 24  Exercises:  Exercise Reps/ Time Weight/ Level Comments Completed today    Manual          STM                 Supine        PROM  20x ea    All planes  x   AAROM flex/abd/ER  20x ea dowel Gil   x   Supine 2 hands clasped shld flex 15x   x   Supine shld Abd/add 10x  Mod A x   Supine shld ER/IR 10x  Added 4/8    Sidelying shld ER 10x  Added 4/8                         Side Lying shld Abd and flex 10x  each  Abd Max A  Flex   Mod A x                          Seated pulleys  4' total  Flex/scaption  x   Seated scap squeeze  10x5\"   x   Seated posterior shoulder roll  10x   x   Finger ladder 5x   New 24                  Standing towel

## 2024-04-10 ENCOUNTER — HOSPITAL ENCOUNTER (OUTPATIENT)
Dept: PHYSICAL THERAPY | Age: 74
Setting detail: THERAPIES SERIES
Discharge: HOME | End: 2024-04-10
Payer: COMMERCIAL

## 2024-04-10 PROCEDURE — 97110 THERAPEUTIC EXERCISES: CPT

## 2024-04-10 NOTE — FLOWSHEET NOTE
[facility]   Outpatient Rehabilitation & Therapy  [address] [facility] [address]  P: [phone]   F: [phone]    Physical Therapy Daily Treatment Note      Date:  4/10/2024  Patient Name:  Sonido Willard    :  1950  MRN: [id number]  Physician: Cathi Armstrong MD                                Insurance: [employer];    AUTH REQUIRED AFTER 12 VISITS   Medical Diagnosis: Z96.611 (ICD-10-CM) - S/P reverse total shoulder arthroplasty, right; Right shoulder A/AAROM.  No strengthening at this time                     Rehab Codes: M25.511, M25.611, M62.511  Onset Date: 24                                  Next 's appt: 24  Visit# / total visits:   ( approved)  Cancels/No Shows: 0/0    Subjective:  Patient reports shoulder is less sore but still painful.  He reports right knee is very sore today but he denies recent trauma.  He nots he is trying to use right UE more at home but he is still very limited  Pain:  [x] Yes  [] No Location: R shoulder anterior/lateral Pain Rating: (0-10 scale) 7/10  Pain altered Tx:  [x] No  [] Yes  Action:    Objective:  Modalities: ice pack applied to R shoulder in supine 10' post exercise  (Pt deferred ice on 4/3/24)  Precautions: Work on A/AAROM; No strengthening yet  (per initial script);  Reverse TSA Right 24  Exercises:  Exercise Reps/ Time Weight/ Level Comments Completed today    Manual          STM  5'  Right lateral/posterior upper arm             Supine        PROM  20x ea    All planes  x   AAROM flex/abd/ER  20x ea dowel Gil   x   Supine 2 hands clasped shld flex 15x   x   Supine shld Abd/add 10x  Mod A x   Supine shld ER/IR 15x  Added 4/8    Sidelying shld ER 10x  Added 4/8                         Side Lying shld Abd and flex 10x  2 each  Abd Max A  Flex   Mod A x                          Seated pulleys  4' total  Flex/scaption  x   Seated scap squeeze  10x5\"   x   Seated posterior shoulder roll  10x   x   Finger ladder 5x   New

## 2024-04-15 ENCOUNTER — HOSPITAL ENCOUNTER (OUTPATIENT)
Dept: PHYSICAL THERAPY | Age: 74
Setting detail: THERAPIES SERIES
Discharge: HOME | End: 2024-04-15
Payer: COMMERCIAL

## 2024-04-15 PROCEDURE — 97110 THERAPEUTIC EXERCISES: CPT

## 2024-04-17 ENCOUNTER — HOSPITAL ENCOUNTER (OUTPATIENT)
Dept: PHYSICAL THERAPY | Age: 74
Setting detail: THERAPIES SERIES
Discharge: HOME | End: 2024-04-17
Payer: COMMERCIAL

## 2024-04-17 PROCEDURE — 97110 THERAPEUTIC EXERCISES: CPT

## 2024-04-17 NOTE — FLOWSHEET NOTE
strength to wfl to facilitate cleaning house  Improve SPADI score to 20% impaired at worst to facilitate yard work     Patient goals:  Decrease pain;  improve use of shoulder    Pt. Education:  [x] Yes  [] No  [x] Reviewed Prior HEP/Ed  Method of Education: [x] Verbal  [x] Demo  [] Written  Comprehension of Education:  [x] Verbalizes understanding.  [] Demonstrates understanding.  [x] Needs review.  [] Demonstrates/verbalizes HEP/Ed previously given.     Access Code: [email or web address]  URL: [email or web address]  Date: 04/08/2024  Prepared by: Osiel Torres    Exercises    - Sidelying Shoulder External Rotation  - 2 x daily - 7 x weekly - 1 sets - 10 reps  - Supine Shoulder External and Internal Rotation in Abduction with Dumbbell  - 2 x daily - 7 x weekly - 1 sets - 10 reps    Access Code: [email or web address]  URL: [email or web address]  Date: 03/18/2024  Prepared by: Osiel Giest     Exercises  - Standing Shoulder Shrugs  - 3 x daily - 7 x weekly - 1 sets - 10 reps  - Seated Scapular Retraction  - 3 x daily - 7 x weekly - 1 sets - 10 reps  - Circular Shoulder Pendulum with Table Support  - 3 x daily - 7 x weekly - 1 sets - 10 reps  - Standing Bent Over Single Arm Scapular Row with Table Support  - 3 x daily - 7 x weekly - 1 sets - 10 reps  - Standing Elbow Flexion Extension AROM  - 3 x daily - 7 x weekly - 1 sets - 10 reps  - Supine Shoulder Flexion AAROM with Hands Clasped  - 2 x daily - 7 x weekly - 1 sets - 10 reps  - Seated Shoulder Flexion Towel Slide at Table Top  - 2 x daily - 7 x weekly - 1 sets - 10 reps    Plan: [x] Continue per plan of care.   [] Other:      Treatment Charges: Mins Units   []  Modalities  ICE     [x]  Ther Exercise 45 3   [x]  Manual Therapy 5 0   []  Ther Activities     []  Aquatics     []  Neuromuscular     [] Vasocompression     [] Gait Training     [] Dry needling        [] 1 or 2 muscles        [] 3 or more muscles     []  Other ice pack      Total Billable time 50 3     Time In: 4:45  pm

## 2024-04-22 ENCOUNTER — HOSPITAL ENCOUNTER (OUTPATIENT)
Dept: PHYSICAL THERAPY | Age: 74
Setting detail: THERAPIES SERIES
Discharge: HOME | End: 2024-04-22
Payer: COMMERCIAL

## 2024-04-22 ENCOUNTER — OFFICE VISIT (OUTPATIENT)
Dept: ORTHOPEDIC SURGERY | Age: 74
End: 2024-04-22

## 2024-04-22 VITALS — HEIGHT: 65 IN | BODY MASS INDEX: 29.49 KG/M2 | WEIGHT: 177 LBS | RESPIRATION RATE: 16 BRPM

## 2024-04-22 DIAGNOSIS — Z96.611 S/P REVERSE TOTAL SHOULDER ARTHROPLASTY, RIGHT: Primary | ICD-10-CM

## 2024-04-22 PROCEDURE — 97110 THERAPEUTIC EXERCISES: CPT

## 2024-04-22 PROCEDURE — 99024 POSTOP FOLLOW-UP VISIT: CPT

## 2024-04-22 NOTE — PROGRESS NOTES
[facility]   Outpatient Rehabilitation & Therapy  [address]  P: [phone]   F: [phone]    Physical Therapy Daily Treatment Note/PROGRESS NOTE      Date:  2024  Patient Name:  Sonido Willard    :  1950  MRN: [id number]  Physician: Cathi Armstrong MD                                Insurance: [employer];    AUTH REQUIRED AFTER 12 VISITS   Medical Diagnosis: Z96.611 (ICD-10-CM) - S/P reverse total shoulder arthroplasty, right; Right shoulder A/AAROM.  No strengthening at this time                     Rehab Codes: M25.511, M25.611, M62.511  Onset Date: 24                                  Next 's appt: 24  Visit# / total visits: 10/36  (10/12 approved) (Count corrected 24) Cancels/No Shows: 0/0    Subjective:  Patient reports he is sore all over again today.  He saw surgeon's PA today and was told he needs to get shoulder moving/stretched out;  He reports the shoulder pain gets up to 9/10 at worst  Pain:  [x] Yes  [] No Location: R shoulder anterior/lateral Pain Rating: (0-10 scale) 8/10  Pain altered Tx:  [x] No  [] Yes  Action:    Objective:  Modalities: ice pack applied to R shoulder in supine 10' post exercise  (Pt deferred ice on 4/3/24)  Precautions: Work on A/AAROM; No strengthening yet  (per initial script);  Reverse TSA Right 24  PER Surgeon's PA 24  not to lift, push, or pull over 3-4 pounds;  needs to work aggressively to regain AROM  Exercises:  Exercise Reps/ Time Weight/ Level Comments Completed today    Manual          STM  5'  Right lateral/posterior upper arm             Supine        PROM  20x ea    All planes  x   AAROM flex/abd/ER  20x ea dowel Gil   x   Supine 2 hands clasped shld flex 15x   x   Chest Press  10x  With cane  New     Supine shld Abd/add 10x  Mod A x   Supine shld ER/IR 15x  Added 8    Sidelying shld ER 10x  Added     Supine serratus 10x  Mod A to stabilize arm    Supine triceps 15x AROM Jorge to

## 2024-04-22 NOTE — PROGRESS NOTES
Procedure: Right shoulder reverse total Arthroplasty  Date of Procedure: 1/30/2024    HPI: Sonido Willard is approximately 3 months status post the aforementioned procedure. he is doing well clinically although he does report some lateral arm soreness and states that the soreness is present most of the time and is worse after physical therapy.  Patient has been going to formal physical therapy at this time and states that he believes that is going well but he does still have significant decrease in his range of motion at this time.  Continues to deny having any fevers, chills, or sweats.  States that he is having trouble lifting the right arm at this time without the use of his opposite arm.    Physical examination:  Resp 16   Ht 1.651 m (5' 5\")   Wt 80.3 kg (177 lb)   BMI 29.45 kg/m²   General Appearance: alert, well appearing, and in no distress  Mental Status: alert, oriented to person, place, and time  Evaluation of the right shoulder and upper extremity demonstrates his shoulder incision to be healed appropriately. No wound dehiscence, drainage, warmth or erythema is appreciated. Sensation is grossly intact light touch in all dermatomes and he has a 2+ radial pulse with brisk capillary refill in all his fingers.     ROM: (Degrees)    Right   A P      Elevation  35 80     Abduction  35 70     ER   30 60     IR   L4            No crepitation noted with range of motion of the right shoulder.    No specific muscle strengthening test were assessed at this point as his range of motion was fairly poor and unable to elicit certain testings due to the lack of range.    he can actively flex and extend the wrist and flex, extend, abduct, and adduct all of his fingers.     Imaging Studies: X-rays of the right shoulder completed on 4/22/2024 were independently reviewed demonstrating a right reverse total shoulder implants to be in acceptable alignment and well fixed without any obvious fracture, dislocation, or

## 2024-04-24 ENCOUNTER — TELEPHONE (OUTPATIENT)
Dept: ORTHOPEDIC SURGERY | Age: 74
End: 2024-04-24

## 2024-04-24 ENCOUNTER — HOSPITAL ENCOUNTER (OUTPATIENT)
Dept: PHYSICAL THERAPY | Age: 74
Setting detail: THERAPIES SERIES
Discharge: HOME | End: 2024-04-24
Payer: COMMERCIAL

## 2024-04-24 DIAGNOSIS — Z96.611 S/P REVERSE TOTAL SHOULDER ARTHROPLASTY, RIGHT: Primary | ICD-10-CM

## 2024-04-24 RX ORDER — DICLOFENAC SODIUM 75 MG/1
75 TABLET, DELAYED RELEASE ORAL 2 TIMES DAILY WITH MEALS
Qty: 28 TABLET | Refills: 0 | Status: SHIPPED | OUTPATIENT
Start: 2024-04-24 | End: 2024-05-08

## 2024-04-24 NOTE — TELEPHONE ENCOUNTER
Spoke with patient and informed him of the script and Tobi's recommendations. Patient voiced understanding.

## 2024-04-24 NOTE — FLOWSHEET NOTE
[] [facility]  Outpatient Rehabilitation &  Therapy  [address]  [phone]  [phone] [] [facility]  Outpatient Rehabilitation &  Therapy  [address]  P: [phone]  F: [phone] [] [facility]  Outpatient Rehabilitation &  Therapy  [address]  P: [phone]  F: [phone] [] [facility]  Outpatient Rehabilitation &  Therapy  [address]  [phone]  [phone] [] [facility]  Outpatient Rehabilitation &  Therapy  [address]   P: [phone]  F: [phone]  [] [facility]  Outpatient Rehabilitation &  Therapy  [address]  P: [phone]  F: [phone] [] [facility]  Outpatient Rehabilitation &  Therapy  [address]  P: [phone]  F: [phone] [] [facility]  Outpatient Rehabilitation &  Therapy  [address]  P: [phone]  F: [phone] [] [facility]  Outpatient Rehabilitation &  Therapy  [address]  P: [phone]  F: [phone]  [x] [facility] Outpatient Rehabilitation &  Therapy  [address]  P: [phone]  F: [phone]     Therapy Cancel/No Show note    Date: 2024  Patient: Sonido Willard  : 1950  MRN: [id number]    Cancels/No Shows to date: 0    For today's appointment patient:    [x]  Cancelled    [] Rescheduled appointment    [] No-show     Reason given by patient:    []  Patient ill    []  Conflicting appointment    [] No transportation      [] Conflict with work    [x] No reason given    [] Weather related    [] COVID-19    [] Other:      Comments:        [x] Next appointment was confirmed    Electronically signed by: Osiel Torres PT

## 2024-04-24 NOTE — TELEPHONE ENCOUNTER
Patient called to advise Tobi he received the OK from Dr Narayanan that he is able to take any anti-inflamatory for his shoulder pain.    Patients pharmacy listed on file has been verified and would like his script to be sent there.    Please return Sonido's call to confirm.    Thank you.

## 2024-04-24 NOTE — TELEPHONE ENCOUNTER
Pt is still waiting to see what antinflammatory Tobi wants him to take now that he has the approval from his other doctor to take one..

## 2024-04-29 ENCOUNTER — HOSPITAL ENCOUNTER (OUTPATIENT)
Dept: PHYSICAL THERAPY | Age: 74
Setting detail: THERAPIES SERIES
Discharge: HOME | End: 2024-04-29
Payer: COMMERCIAL

## 2024-04-29 PROCEDURE — 97110 THERAPEUTIC EXERCISES: CPT

## 2024-04-29 NOTE — PROGRESS NOTES
Side Lying shld Abd and flex 10x  2 each  Abd Max A  Flex   Mod A x                          Seated pulleys  4' total  Flex/scaption  x   Seated scap squeeze  10x5\"   x   Seated posterior shoulder roll  10x   x   Finger ladder 5x   New 4/8/24 x                 Standing table scrubs 10x5\"   Flex/abd  x   Pendulum circles 15 each   x   Bent Over Rows 15x  Increase reps 4/8 x   Cane shoulder abd and ER 15x each  Increased reps 4/10 x   Biceps 2#  x 20  New 4/22 x   Stand shld ER/IR  elbow flexed to 90 and straight 15x each  AROM  New 4/22 x   UBE                         Specific Instructions for next treatment: Review previous treatment and response to HEP;  Advance as able AROM/AAROM      Right  ROM 3/27/24 4/1/24 4/3/24 4/8/24 4/15/24 4/17/24 4/22/24 4/29/24   Shoulder Flex 86 90 94 97 99 98 102 106   Abd 71 77 82 85 89 92 94 96   Shld ER 25 30 34 38 44 47 49 52   Shld IR 38 38 39 42 44 46 51 55                     Assessment:  [x] Progressing toward goals. 4/29/24  Pt reports today his knee pain is limiting him more than shoulder is.  Still rates pain as 8/10 as he enters PT but reports if he sits at home he can get shoulder pain free.  He demonstrated improved right shoulder ROM today.  The shoulder is very stiff at end ROM with fairly firm end feel for flexion and abduction.  Pt also reports he is very sore at end ROM.  He still needs skilled PT due to recent fall and injury to right shoulder.  Injury needed reverse TSA  to repair.  He is now dealing with inflammation from injury and surgery but is progressing with PT.  He needs more PT to maximize his shoulder ROM, strength, and function which should also reduce his pain.     [] Other:    [x] Patient would continue to benefit from skilled physical therapy services in order to: reduce deficits and help him return to full prior level of function     STG: (to be met in 10 treatments)  ? Pain: right shoulder to 5/10 at worst to facilitate ADLs and cleaning house

## 2024-05-01 ENCOUNTER — HOSPITAL ENCOUNTER (OUTPATIENT)
Dept: PHYSICAL THERAPY | Age: 74
Setting detail: THERAPIES SERIES
Discharge: HOME | End: 2024-05-01
Payer: COMMERCIAL

## 2024-05-01 PROCEDURE — 97110 THERAPEUTIC EXERCISES: CPT

## 2024-05-01 NOTE — PROGRESS NOTES
sets - 10 reps  - Seated Scapular Retraction  - 3 x daily - 7 x weekly - 1 sets - 10 reps  - Circular Shoulder Pendulum with Table Support  - 3 x daily - 7 x weekly - 1 sets - 10 reps  - Standing Bent Over Single Arm Scapular Row with Table Support  - 3 x daily - 7 x weekly - 1 sets - 10 reps  - Standing Elbow Flexion Extension AROM  - 3 x daily - 7 x weekly - 1 sets - 10 reps  - Supine Shoulder Flexion AAROM with Hands Clasped  - 2 x daily - 7 x weekly - 1 sets - 10 reps  - Seated Shoulder Flexion Towel Slide at Table Top  - 2 x daily - 7 x weekly - 1 sets - 10 reps    Plan: [x] Continue per plan of care.   [] Other:      Treatment Charges: Mins Units   []  Modalities  ICE     [x]  Ther Exercise 40 3   [x]  Manual Therapy 5 0   []  Ther Activities     []  Aquatics     []  Neuromuscular     [] Vasocompression     [] Gait Training     [] Dry needling        [] 1 or 2 muscles        [] 3 or more muscles     []  Other ice pack      Total Billable time 45 3     Time In: 5:30  pm             Time Out: 6:20 pm     Electronically signed by:  Osiel Torres, PT

## 2024-05-06 ENCOUNTER — HOSPITAL ENCOUNTER (OUTPATIENT)
Dept: PHYSICAL THERAPY | Age: 74
Setting detail: THERAPIES SERIES
Discharge: HOME | End: 2024-05-06
Payer: COMMERCIAL

## 2024-05-06 NOTE — FLOWSHEET NOTE
[] [facility]  Outpatient Rehabilitation &  Therapy  [address]  [phone]  [phone] [] [facility]  Outpatient Rehabilitation &  Therapy  [address]  P: [phone]  F: [phone] [] [facility]  Outpatient Rehabilitation &  Therapy  [address]  P: [phone]  F: [phone] [] [facility]  Outpatient Rehabilitation &  Therapy  [address]  [phone]  [phone] [] [facility]  Outpatient Rehabilitation &  Therapy  [address]   P: [phone]  F: [phone]  [] [facility]  Outpatient Rehabilitation &  Therapy  [address]  P: [phone]  F: [phone] [] [facility]  Outpatient Rehabilitation &  Therapy  [address]  P: [phone]  F: [phone] [] [facility]  Outpatient Rehabilitation &  Therapy  [address]  P: [phone]  F: [phone] [] [facility]  Outpatient Rehabilitation &  Therapy  [address]  P: [phone]  F: [phone]  [x] [facility] Outpatient Rehabilitation &  Therapy  [address]  P: [phone]  F: [phone]     Therapy Cancel/No Show note    Date: 2024  Patient: Sonido Willard  : 1950  MRN: [id number]    Cancels/No Shows to date: 20    For today's appointment patient:    [x]  Cancelled    [] Rescheduled appointment    [] No-show     Reason given by patient:    []  Patient ill    []  Conflicting appointment    [] No transportation      [] Conflict with work    [] No reason given    [] Weather related    [] COVID-19    [] Other:      Comments:  Awaiting Insurance approval      [x] Next appointment was confirmed    Electronically signed by: Osiel Torres PT

## 2024-05-08 ENCOUNTER — TELEPHONE (OUTPATIENT)
Dept: ORTHOPEDIC SURGERY | Age: 74
End: 2024-05-08

## 2024-05-08 NOTE — TELEPHONE ENCOUNTER
Left message with Teresa to call office back. Ideally patient has 30 more visits approved since he is SP Rt RSA approximately 3 months ago and Tobi at last office visit on 4/22/24 recommended that the patient continue physical therapy.

## 2024-05-08 NOTE — TELEPHONE ENCOUNTER
Received call from Teresa morfin [place] requesting to speak to the office to see if 12 visits would be ok instead of 30 visits. Stated she can approve 12 visits right now but for 30 visits she would need to send the request to the doctor to review for 30 visits. Stated she recommends starting with 12 visits then sending another request in for additional visits after patient starts physical therapy.    Please advise - call back#: [phone]

## 2024-05-09 ENCOUNTER — HOSPITAL ENCOUNTER (OUTPATIENT)
Dept: PHYSICAL THERAPY | Age: 74
Setting detail: THERAPIES SERIES
End: 2024-05-09
Payer: COMMERCIAL

## 2024-05-09 DIAGNOSIS — N52.9 ERECTILE DYSFUNCTION, UNSPECIFIED ERECTILE DYSFUNCTION TYPE: ICD-10-CM

## 2024-05-09 RX ORDER — SILDENAFIL 50 MG/1
50 TABLET, FILM COATED ORAL PRN
Qty: 6 TABLET | Refills: 0 | Status: SHIPPED | OUTPATIENT
Start: 2024-05-09

## 2024-05-13 ENCOUNTER — HOSPITAL ENCOUNTER (OUTPATIENT)
Dept: PHYSICAL THERAPY | Age: 74
Setting detail: THERAPIES SERIES
Discharge: HOME | End: 2024-05-13
Payer: COMMERCIAL

## 2024-05-13 DIAGNOSIS — Z96.611 S/P REVERSE TOTAL SHOULDER ARTHROPLASTY, RIGHT: ICD-10-CM

## 2024-05-13 RX ORDER — DICLOFENAC SODIUM 75 MG/1
TABLET, DELAYED RELEASE ORAL
Qty: 28 TABLET | Refills: 0 | OUTPATIENT
Start: 2024-05-13

## 2024-05-13 RX ORDER — DICLOFENAC SODIUM 75 MG/1
TABLET, DELAYED RELEASE ORAL
Qty: 28 TABLET | Refills: 0 | Status: SHIPPED | OUTPATIENT
Start: 2024-05-13

## 2024-05-13 NOTE — FLOWSHEET NOTE
[] [facility]  Outpatient Rehabilitation &  Therapy  [address]  [phone]  [phone] [] [facility]  Outpatient Rehabilitation &  Therapy  [address]  P: [phone]  F: [phone] [] [facility]  Outpatient Rehabilitation &  Therapy  [address]  P: [phone]  F: [phone] [] [facility]  Outpatient Rehabilitation &  Therapy  [address]  [phone]  [phone] [] [facility]  Outpatient Rehabilitation &  Therapy  [address]   P: [phone]  F: [phone]  [] [facility]  Outpatient Rehabilitation &  Therapy  [address]  P: [phone]  F: [phone] [] [facility]  Outpatient Rehabilitation &  Therapy  [address]  P: [phone]  F: [phone] [] [facility]  Outpatient Rehabilitation &  Therapy  [address]  P: [phone]  F: [phone] [] [facility]  Outpatient Rehabilitation &  Therapy  [address]  P: [phone]  F: [phone]  [x] [facility] Outpatient Rehabilitation &  Therapy  [address]  P: [phone]  F: [phone]     Therapy Cancel/No Show note    Date: 2024  Patient: Sonido Willard  : 1950  MRN: [id number]    Cancels/No Shows to date: 3/0    For today's appointment patient:    [x]  Cancelled    [] Rescheduled appointment    [] No-show     Reason given by patient:    []  Patient ill    []  Conflicting appointment    [] No transportation      [] Conflict with work    [] No reason given    [] Weather related    [] COVID-19    [x] Other:      Comments:  Awaiting Insurance approval      [x] Next appointment was confirmed    Electronically signed by: Osiel Torres PT

## 2024-05-13 NOTE — TELEPHONE ENCOUNTER
Patient is requesting a refill of Voltaren. Last prescribed 4/24/24 for 14 days. Last office visit 47/22/24. Medication pended for review.

## 2024-05-14 ENCOUNTER — OFFICE VISIT (OUTPATIENT)
Dept: FAMILY MEDICINE CLINIC | Age: 74
End: 2024-05-14
Payer: COMMERCIAL

## 2024-05-14 VITALS
DIASTOLIC BLOOD PRESSURE: 85 MMHG | SYSTOLIC BLOOD PRESSURE: 145 MMHG | TEMPERATURE: 98 F | HEART RATE: 67 BPM | OXYGEN SATURATION: 97 %

## 2024-05-14 DIAGNOSIS — L03.012 CELLULITIS OF FINGER OF LEFT HAND: Primary | ICD-10-CM

## 2024-05-14 PROCEDURE — 3079F DIAST BP 80-89 MM HG: CPT | Performed by: NURSE PRACTITIONER

## 2024-05-14 PROCEDURE — 99213 OFFICE O/P EST LOW 20 MIN: CPT | Performed by: NURSE PRACTITIONER

## 2024-05-14 PROCEDURE — 3077F SYST BP >= 140 MM HG: CPT | Performed by: NURSE PRACTITIONER

## 2024-05-14 PROCEDURE — 1123F ACP DISCUSS/DSCN MKR DOCD: CPT | Performed by: NURSE PRACTITIONER

## 2024-05-14 RX ORDER — CEPHALEXIN 500 MG/1
500 CAPSULE ORAL 4 TIMES DAILY
Qty: 28 CAPSULE | Refills: 0 | Status: SHIPPED | OUTPATIENT
Start: 2024-05-14 | End: 2024-05-21

## 2024-05-14 ASSESSMENT — ENCOUNTER SYMPTOMS: BURN: 1

## 2024-05-14 NOTE — PROGRESS NOTES
[facility] PHYSICIANS [facility] WALK-IN FAMILY MEDICINE  [address]  [address]  [facility] [address]  Dept: [phone]  Dept Fax: [phone]    Sonido Willard is a 73 y.o. male who presents to the urgent care today for his medical conditions/complaints as notedbelow.  Sonido Willard is c/o of Burn (Left index finger onset 2 weeks ago, patient states he used silvadene yesterday and now seems worse )      HPI:     73 yr old male presents for left index finger burn that occurred 2 weeks ago on oven grate when accidentally brushed against it.   Blister filled with pus, he squeezed it, sx seemed better then worse. More painful, redder  Tried aloe with some relief  Had  silvadene cream at home, tried that with no relief, possibly made it worse.   No chills or fevers, feels fine  T2 NIDDM    Burn  The incident occurred more than 1 week ago. The burns occurred at home. The burns occurred while cooking. The burns were a result of contact with a hot surface. The burns are located on the left fingers. The pain is mild. He has tried salve for the symptoms. The treatment provided no relief.       Past Medical History:   Diagnosis Date    Arthritis     BPH (benign prostatic hyperplasia)     Cancer (HCC)     lymphoma- on Methotrexate    Colon polyps 2017    colonoscopy at [facility]    Diabetes mellitus (HCC)     History of colon polyps ,     Hypothyroidism 2012    Iron deficiency     Neutropenia (HCC)     Osteoarthritis     Prediabetes     has lost weight    Prominent ileocecal valve 2017    Unspecified essential hypertension 2012        Current Outpatient Medications   Medication Sig Dispense Refill    cephALEXin (KEFLEX) 500 MG capsule Take 1 capsule by mouth 4 times daily for 7 days 28 capsule 0    mupirocin (BACTROBAN) 2 % ointment Apply topically 3 times daily. 1 each 0    diclofenac (VOLTAREN) 75 MG EC tablet TAKE 1 TABLET BY MOUTH TWICE

## 2024-05-14 NOTE — PATIENT INSTRUCTIONS
Wash affected area with antibacterial soap daily  Keep open to air as able, cover when in dirty environment  Avoid dishwashing and submerging in standing water until well healed

## 2024-05-16 ENCOUNTER — HOSPITAL ENCOUNTER (OUTPATIENT)
Dept: PHYSICAL THERAPY | Age: 74
Setting detail: THERAPIES SERIES
Discharge: HOME | End: 2024-05-16
Payer: COMMERCIAL

## 2024-05-16 PROCEDURE — 97110 THERAPEUTIC EXERCISES: CPT

## 2024-05-16 NOTE — FLOWSHEET NOTE
- 7 x weekly - 1 sets - 10 reps  - Seated Scapular Retraction  - 3 x daily - 7 x weekly - 1 sets - 10 reps  - Circular Shoulder Pendulum with Table Support  - 3 x daily - 7 x weekly - 1 sets - 10 reps  - Standing Bent Over Single Arm Scapular Row with Table Support  - 3 x daily - 7 x weekly - 1 sets - 10 reps  - Standing Elbow Flexion Extension AROM  - 3 x daily - 7 x weekly - 1 sets - 10 reps  - Supine Shoulder Flexion AAROM with Hands Clasped  - 2 x daily - 7 x weekly - 1 sets - 10 reps  - Seated Shoulder Flexion Towel Slide at Table Top  - 2 x daily - 7 x weekly - 1 sets - 10 reps    Plan: [x] Continue per plan of care.   [] Other:      Treatment Charges: Mins Units   []  Modalities  ICE     [x]  Ther Exercise 40 3   [x]  Manual Therapy 5 0   []  Ther Activities     []  Aquatics     []  Neuromuscular     [] Vasocompression     [] Gait Training     [] Dry needling        [] 1 or 2 muscles        [] 3 or more muscles     []  Other ice pack      Total Billable time 45 3     Time In: 4:20  pm             Time Out: 5:20 pm   Pt only charged for 45 minutes as PT with another pt when Sonido first arrived  Electronically signed by:  Osiel Torres, PT

## 2024-05-20 ENCOUNTER — TELEPHONE (OUTPATIENT)
Dept: INTERNAL MEDICINE CLINIC | Age: 74
End: 2024-05-20

## 2024-05-21 DIAGNOSIS — E11.9 TYPE 2 DIABETES MELLITUS WITHOUT COMPLICATION, WITHOUT LONG-TERM CURRENT USE OF INSULIN (HCC): ICD-10-CM

## 2024-05-21 RX ORDER — BLOOD SUGAR DIAGNOSTIC
STRIP MISCELLANEOUS
Qty: 100 EACH | Refills: 5 | Status: SHIPPED | OUTPATIENT
Start: 2024-05-21

## 2024-05-29 ENCOUNTER — HOSPITAL ENCOUNTER (OUTPATIENT)
Dept: PHYSICAL THERAPY | Age: 74
Setting detail: THERAPIES SERIES
Discharge: HOME | End: 2024-05-29
Payer: COMMERCIAL

## 2024-05-29 PROCEDURE — 97110 THERAPEUTIC EXERCISES: CPT

## 2024-05-29 NOTE — FLOWSHEET NOTE
[facility]   Outpatient Rehabilitation & Therapy  [address]  P: [phone]   F: [phone]    Physical Therapy Daily Treatment Note      Date:  2024  Patient Name:  Sonido Willard    :  1950  MRN: [id number]  Physician: Cathi Armstrong MD                                Insurance: [employer];    AUTH REQUIRED AFTER 12 VISITS   Medical Diagnosis: Z96.611 (ICD-10-CM) - S/P reverse total shoulder arthroplasty, right; Right shoulder A/AAROM.  No strengthening at this time                     Rehab Codes: M25.511, M25.611, M62.511  Onset Date: 24                                  Next 's appt: 24  Visit# / total visits:   ( approved) (Count corrected 24) Cancels/No Shows: 1/0  Approved  12 visits  from 24--24     Subjective:  Patient reports right shoulder is more sore.  He reports he has been doing more with right UE  Pain:  [x] Yes  [] No Location: R shoulder anterior/lateral Pain Rating: (0-10 scale) 10/10  Pain altered Tx:  [x] No  [] Yes  Action:    Objective:  Modalities: ice pack applied to R shoulder in supine 10' post exercise  (Pt deferred ice on 4/3/24)  Precautions: Work on A/AAROM; No strengthening yet  (per initial script);  Reverse TSA Right 24  PER Surgeon's PA 24  not to lift, push, or pull over 3-4 pounds;  needs to work aggressively to regain AROM  Exercises:  Exercise Reps/ Time Weight/ Level Comments Completed today    Manual          STM  5'  Right lateral/posterior upper arm             Supine        PROM  20x ea    All planes  x   AAROM flex/abd/ER  20x ea cane  x   Supine 2 hands clasped shld flex 15x   x   Chest Press  10x 2  With cane  New     Supine shld Abd/add 10x 2  Mod A x   Supine shld ER/IR 15x 2  Added 4/8 x   Sidelying shld ER 10x  Added 4/8 x   Supine serratus 10x  Mod A to stabilize arm x   Supine triceps 15x AROM Jorge to stabilize upper arm  New  x   Rhythmic stabilization 20\" x 2

## 2024-06-03 ENCOUNTER — HOSPITAL ENCOUNTER (OUTPATIENT)
Dept: PHYSICAL THERAPY | Age: 74
Setting detail: THERAPIES SERIES
Discharge: HOME | End: 2024-06-03
Payer: COMMERCIAL

## 2024-06-03 PROCEDURE — 97161 PT EVAL LOW COMPLEX 20 MIN: CPT

## 2024-06-03 PROCEDURE — 97110 THERAPEUTIC EXERCISES: CPT

## 2024-06-03 NOTE — FLOWSHEET NOTE
[facility]   Outpatient Rehabilitation & Therapy  [address]  P: [phone]   F: [phone]    Physical Therapy Daily Treatment Note      Date:  6/3/2024  Patient Name:  Sonido Willard    :  1950  MRN: [id number]  Physician: Cathi Armstrong MD                                Insurance: [employer];    AUTH REQUIRED AFTER 12 VISITS   Medical Diagnosis: Z96.611 (ICD-10-CM) - S/P reverse total shoulder arthroplasty, right; Right shoulder A/AAROM.  No strengthening at this time                     Rehab Codes: M25.511, M25.611, M62.511  Onset Date: 24                                  Next 's appt: 24  Visit# / total visits: 15/36  (3/12 approved) (Count corrected 24) Cancels/No Shows: 1/0  Approved  12 visits  from 24--24     Subjective:  Patient reports right shoulder is very sore today and was over the weekend.  He reports right knee is also very sore  Pain:  [x] Yes  [] No Location: R shoulder anterior/lateral Pain Rating: (0-10 scale) 8/10  Pain altered Tx:  [x] No  [] Yes  Action:    Objective:  Modalities: ice pack applied to R shoulder in supine 10' post exercise  (Pt deferred ice on 4/3/24)  Precautions: Work on A/AAROM; No strengthening yet  (per initial script);  Reverse TSA Right 24  PER Surgeon's PA 24  not to lift, push, or pull over 3-4 pounds;  needs to work aggressively to regain AROM  Exercises:  Exercise Reps/ Time Weight/ Level Comments Completed today    Manual          STM  5'  Right lateral/posterior upper arm             Supine        PROM  20x ea    All planes  x   AAROM flex/abd/ER  20x ea cane  x   Supine 2 hands clasped shld flex 15x   x   Chest Press  10x 2  With cane  New     Supine shld Abd/add 10x 2  Mod A x   Supine shld ER/IR 15x 2  Added 4/8 x   Sidelying shld ER 10x  Added / x   Supine serratus 10x  Min A to stabilize arm x   Supine triceps 15x AROM Jorge to stabilize upper arm  New  x   Rhythmic

## 2024-06-04 DIAGNOSIS — Z96.611 S/P REVERSE TOTAL SHOULDER ARTHROPLASTY, RIGHT: ICD-10-CM

## 2024-06-05 ENCOUNTER — HOSPITAL ENCOUNTER (OUTPATIENT)
Dept: PHYSICAL THERAPY | Age: 74
Setting detail: THERAPIES SERIES
Discharge: HOME | End: 2024-06-05
Payer: COMMERCIAL

## 2024-06-05 PROCEDURE — 97110 THERAPEUTIC EXERCISES: CPT

## 2024-06-05 RX ORDER — DICLOFENAC SODIUM 75 MG/1
TABLET, DELAYED RELEASE ORAL
Qty: 28 TABLET | Refills: 0 | OUTPATIENT
Start: 2024-06-05

## 2024-06-05 NOTE — FLOWSHEET NOTE
reduce deficits and help him return to full prior level of function     STG: (to be met in 10 treatments)  ? Pain: right shoulder to 5/10 at worst to facilitate ADLs and cleaning house 4/22/24 Not MET, up to 9/10 at worst  ? ROM: right shoulder to 130 degrees of abduction and flexion; and 60 degrees of ER and IR to facilitate driving and yard work 4/22/24 Not Met but steadily progressing  ? Strength: right shoulder to 3+/5 to facilitate ADLs and cleaning house  4/22/24 Not met but improved to: shoulder flexion 1+/5, Abduction 1/5, Extension 2/5, ER 1/5, IR 1/5  ? Function: Pt to perform all ADLs without right shoulder pain 4/22/24 Not Met but Improving  Patient to be independent with home exercise program as demonstrated by performance with correct form without cues. 4/22/24 Not Met but progressing;  still needs verbal and tactile cues at times     LTG: (to be met in 36 treatments)   Improve right shoulder ROM to wfl to facilitate yard work and driving  Improve right shoulder strength to wfl to facilitate cleaning house  Improve SPADI score to 20% impaired at worst to facilitate yard work  4/22/24 Not Met but improved to 85/130  or 65% impaired;  originally 75% impaired on 3/18/24     Patient goals:  Decrease pain;  improve use of shoulder    Pt. Education:  [x] Yes  [] No  [x] Reviewed Prior HEP/Ed  Method of Education: [x] Verbal  [x] Demo  [] Written  Comprehension of Education:  [x] Verbalizes understanding.  [] Demonstrates understanding.  [x] Needs review.  [] Demonstrates/verbalizes HEP/Ed previously given.     Access Code: [email or web address]  URL: [email or web address]  Date: 04/08/2024  Prepared by: Osiel Torres    Exercises    - Sidelying Shoulder External Rotation  - 2 x daily - 7 x weekly - 1 sets - 10 reps  - Supine Shoulder External and Internal Rotation in Abduction with Dumbbell  - 2 x daily - 7 x weekly - 1 sets - 10 reps    Access Code: [email or web address]  URL: [email or web address]  Date:

## 2024-06-06 DIAGNOSIS — Z96.611 S/P REVERSE TOTAL SHOULDER ARTHROPLASTY, RIGHT: ICD-10-CM

## 2024-06-06 RX ORDER — DICLOFENAC SODIUM 75 MG/1
TABLET, DELAYED RELEASE ORAL
Qty: 28 TABLET | Refills: 0 | Status: SHIPPED | OUTPATIENT
Start: 2024-06-06

## 2024-06-06 NOTE — TELEPHONE ENCOUNTER
Pharmacy requested Voltaren refill for patient who is status post reverse shoulder arthroplasty 1/30/24. Last office visit was on 4/22/24

## 2024-06-07 DIAGNOSIS — Z96.611 S/P REVERSE TOTAL SHOULDER ARTHROPLASTY, RIGHT: ICD-10-CM

## 2024-06-07 RX ORDER — DICLOFENAC SODIUM 75 MG/1
TABLET, DELAYED RELEASE ORAL
Qty: 28 TABLET | Refills: 0 | OUTPATIENT
Start: 2024-06-07

## 2024-06-12 ENCOUNTER — APPOINTMENT (OUTPATIENT)
Dept: PHYSICAL THERAPY | Age: 74
End: 2024-06-12
Payer: COMMERCIAL

## 2024-06-13 ENCOUNTER — HOSPITAL ENCOUNTER (OUTPATIENT)
Dept: PHYSICAL THERAPY | Age: 74
Setting detail: THERAPIES SERIES
Discharge: HOME | End: 2024-06-13
Payer: COMMERCIAL

## 2024-06-13 NOTE — FLOWSHEET NOTE
[] [facility]  Outpatient Rehabilitation &  Therapy  [address]  [phone]  [phone] [] [facility]  Outpatient Rehabilitation &  Therapy  [address]  P: [phone]  F: [phone] [] [facility]  Outpatient Rehabilitation &  Therapy  [address]  P: [phone]  F: [phone] [] [facility]  Outpatient Rehabilitation &  Therapy  [address]  [phone]  [phone] [] [facility]  Outpatient Rehabilitation &  Therapy  [address]   P: [phone]  F: [phone]  [] [facility]  Outpatient Rehabilitation &  Therapy  [address]  P: [phone]  F: [phone] [] [facility]  Outpatient Rehabilitation &  Therapy  [address]  P: [phone]  F: [phone] [] [facility]  Outpatient Rehabilitation &  Therapy  [address]  P: [phone]  F: [phone] [] [facility]  Outpatient Rehabilitation &  Therapy  [address]  P: [phone]  F: [phone]  [x] [facility] Outpatient Rehabilitation &  Therapy  [address]  P: [phone]  F: [phone]     Therapy Cancel/No Show note    Date: 2024  Patient: Sonido Willard  : 1950  MRN: [id number]    Cancels/No Shows to date:     For today's appointment patient:    [x]  Cancelled    [] Rescheduled appointment    [] No-show     Reason given by patient:    []  Patient ill    []  Conflicting appointment    [] No transportation      [] Conflict with work    [x] No reason given    [] Weather related    [] COVID-19    [] Other:      Comments:        [x] Next appointment was confirmed    Electronically signed by: Osiel Torres PT

## 2024-06-17 ENCOUNTER — HOSPITAL ENCOUNTER (OUTPATIENT)
Dept: PHYSICAL THERAPY | Age: 74
Setting detail: THERAPIES SERIES
Discharge: HOME | End: 2024-06-17
Payer: COMMERCIAL

## 2024-06-17 PROCEDURE — 97110 THERAPEUTIC EXERCISES: CPT

## 2024-06-17 NOTE — FLOWSHEET NOTE
[email or web address]  Date: 03/18/2024  Prepared by: Osiel Torres     Exercises  - Standing Shoulder Shrugs  - 3 x daily - 7 x weekly - 1 sets - 10 reps  - Seated Scapular Retraction  - 3 x daily - 7 x weekly - 1 sets - 10 reps  - Circular Shoulder Pendulum with Table Support  - 3 x daily - 7 x weekly - 1 sets - 10 reps  - Standing Bent Over Single Arm Scapular Row with Table Support  - 3 x daily - 7 x weekly - 1 sets - 10 reps  - Standing Elbow Flexion Extension AROM  - 3 x daily - 7 x weekly - 1 sets - 10 reps  - Supine Shoulder Flexion AAROM with Hands Clasped  - 2 x daily - 7 x weekly - 1 sets - 10 reps  - Seated Shoulder Flexion Towel Slide at Table Top  - 2 x daily - 7 x weekly - 1 sets - 10 reps    Plan: [x] Continue per plan of care.   [] Other:      Treatment Charges: Mins Units   []  Modalities  ICE     [x]  Ther Exercise 42 3   [x]  Manual Therapy 5 0   []  Ther Activities     []  Aquatics     []  Neuromuscular     [] Vasocompression     [] Gait Training     [] Dry needling        [] 1 or 2 muscles        [] 3 or more muscles     []  Other ice pack      Total Billable time 57 3     Time In: 8:12  am             Time Out: 9:04  am     Electronically signed by:  Osiel Torres, PT

## 2024-06-19 ENCOUNTER — HOSPITAL ENCOUNTER (OUTPATIENT)
Dept: PHYSICAL THERAPY | Age: 74
Setting detail: THERAPIES SERIES
Discharge: HOME | End: 2024-06-19
Payer: COMMERCIAL

## 2024-06-19 ENCOUNTER — APPOINTMENT (OUTPATIENT)
Dept: PHYSICAL THERAPY | Age: 74
End: 2024-06-19
Payer: COMMERCIAL

## 2024-06-19 NOTE — FLOWSHEET NOTE
[x] [facility]   Outpatient Rehabilitation & Therapy  [address]  P: [phone]   F: [phone]     Physical Therapy Cancel/No Show note    Date: 2024  Patient: Sonido Willard  : 1950  MRN: [id number]    Visit Count:   Cancels/No Shows to date: 3/0    For today's appointment patient:    [x]  Cancelled    [] Rescheduled appointment    [] No-show     Reason given by patient:    []  Patient ill    []  Conflicting appointment    [] No transportation      [] Conflict with work    [x] No reason given    [] Weather related    [] COVID-19    [] Other:      Comments:        [] Next appointment was confirmed    Electronically signed by: Pushap Hall PTA

## 2024-06-24 ENCOUNTER — HOSPITAL ENCOUNTER (OUTPATIENT)
Dept: PHYSICAL THERAPY | Age: 74
Setting detail: THERAPIES SERIES
Discharge: HOME | End: 2024-06-24
Payer: COMMERCIAL

## 2024-06-24 PROCEDURE — 97110 THERAPEUTIC EXERCISES: CPT

## 2024-06-24 NOTE — FLOWSHEET NOTE
A to stabilize arm Increase reps 6/5 x   Supine triceps 20x AROM Jorge to stabilize;   Increase reps  6/5 x   Rhythmic stabilization 20\" x 2      Side Lying shld Abd and flex 10x  2 each  Abd Min A  Flex   Mod A x                          Seated pulleys  4' total  Flex/scaption  x   Seated scap squeeze  10x5\"   x   Seated posterior shoulder roll  10x   x   Finger ladder 5x   New 4/8/24 x                 Standing table scrubs 10x5\"   Flex/abd  x   Pendulum circles 15 each   x   Bent Over Rows 15x  2#  Increase weight 6/3 x   Cane shoulder abd and ER 15x each  Increased reps 4/10 x   Biceps 3#  x 20  Increased weight 5/29 x   Stand shld ER/IR  elbow flexed to 90 and straight 15x each  AROM  New 4/22 x   UBE  FWD/Retro 2' each  New 5/1 x   Wall scrubs 10x   New 5/29  Both hands on towel x   THERABAND       Rows 20 x yellow Increased reps 6/17 x   Shld Ext 20 x yellow Increased reps 6/17 x   Shld Add 20 x yellow Increased reps 6/17 x   Shld ER   20 x yellow Increased reps 6/24 x   Shld IR 20 x yellow Increased reps 6/24 x                                                Specific Instructions for next treatment: Review previous treatment and response to HEP;  Advance as able AROM/AAROM      Right  ROM 3/27/24 4/1/24 4/3/24 4/8/24 4/15/24 4/17/24 4/22/24 4/29/24 5/1/24 5/16/24 5/29/24 6/3/24 6/5/24 6/17/24 6/24/24   Shoulder Flex 86 90 94 97 99 98 102 106 113 108 119 126 132 136 141   Abd 71 77 82 85 89 92 94 96 98 93 104 109 114 115 123   Shld ER 25 30 34 38 44 47 49 52 54 59 58 64 66 65 67   Shld IR 38 38 39 42 44 46 51 55 61 65 68 72 73 75 76                            Assessment:  [x] Progressing toward goals. 6/24  Good progress again noted with shoulder ROM.  Pain still limits pt but strength is progressing.  AROM also improved but elevation of right UE is still very limited by strength.  Pt did note pain with PT today.  He performed increased reps of some of his theraband exercises today.  Pt demonstrates less painful

## 2024-06-25 DIAGNOSIS — E03.9 HYPOTHYROIDISM, UNSPECIFIED TYPE: ICD-10-CM

## 2024-06-26 ENCOUNTER — HOSPITAL ENCOUNTER (OUTPATIENT)
Dept: PHYSICAL THERAPY | Age: 74
Setting detail: THERAPIES SERIES
Discharge: HOME | End: 2024-06-26
Payer: COMMERCIAL

## 2024-06-26 ENCOUNTER — HOSPITAL ENCOUNTER (OUTPATIENT)
Age: 74
Setting detail: SPECIMEN
Discharge: HOME | End: 2024-06-26

## 2024-06-26 DIAGNOSIS — R73.03 PREDIABETES: ICD-10-CM

## 2024-06-26 DIAGNOSIS — Z12.5 SCREENING FOR PROSTATE CANCER: ICD-10-CM

## 2024-06-26 DIAGNOSIS — D64.89 ANEMIA DUE TO OTHER CAUSE, NOT CLASSIFIED: ICD-10-CM

## 2024-06-26 LAB
ERYTHROCYTE [DISTWIDTH] IN BLOOD BY AUTOMATED COUNT: 14.1 % (ref 11.8–14.4)
EST. AVERAGE GLUCOSE BLD GHB EST-MCNC: 120 MG/DL
HBA1C MFR BLD: 5.8 % (ref 4–6)
HCT VFR BLD AUTO: 40.7 % (ref 40.7–50.3)
HGB BLD-MCNC: 13.1 G/DL (ref 13–17)
MCH RBC QN AUTO: 31.1 PG (ref 25.2–33.5)
MCHC RBC AUTO-ENTMCNC: 32.2 G/DL (ref 28.4–34.8)
MCV RBC AUTO: 96.7 FL (ref 82.6–102.9)
NRBC BLD-RTO: 0 PER 100 WBC
PLATELET # BLD AUTO: 189 K/UL (ref 138–453)
PMV BLD AUTO: 11.3 FL (ref 8.1–13.5)
PSA SERPL-MCNC: 1.2 NG/ML (ref 0–4)
RBC # BLD AUTO: 4.21 M/UL (ref 4.21–5.77)
WBC OTHER # BLD: 7.2 K/UL (ref 3.5–11.3)

## 2024-06-26 PROCEDURE — 97110 THERAPEUTIC EXERCISES: CPT

## 2024-06-26 RX ORDER — LEVOTHYROXINE SODIUM 0.12 MG/1
125 TABLET ORAL DAILY
Qty: 90 TABLET | Refills: 3 | Status: SHIPPED | OUTPATIENT
Start: 2024-06-26

## 2024-06-26 NOTE — FLOWSHEET NOTE
[facility]   Outpatient Rehabilitation & Therapy  [address]  P: [phone]   F: [phone]    Physical Therapy Daily Treatment Note      Date:  2024  Patient Name:  Sonido Willard    :  1950  MRN: [id number]  Physician: Cathi Armstrong MD                                Insurance: [email or web address];    AUTH REQUIRED AFTER 12 VISITS   Medical Diagnosis: Z96.611 (ICD-10-CM) - S/P reverse total shoulder arthroplasty, right; Right shoulder A/AAROM.  No strengthening at this time                     Rehab Codes: M25.511, M25.611, M62.511  Onset Date: 24                                  Next 's appt: 24  Visit# / total visits:   ( approved) (Count corrected 24) Cancels/No Shows: 2/0  Approved  12 visits  from 24--24   Progress Note due by visit 20  Subjective:  Patient reports he has been working the right shoulder at home.  He reports he is still sore but feels a little stronger  Pain:  [x] Yes  [] No Location: R shoulder anterior/lateral Pain Rating: (0-10 scale) 6.5/10  Pain altered Tx:  [x] No  [] Yes  Action:    Objective:  Modalities:  Precautions: Work on A/AAROM; No strengthening yet  (per initial script);  Reverse TSA Right 24  PER Surgeon's PA 24  not to lift, push, or pull over 3-4 pounds;  needs to work aggressively to regain AROM  Exercises:  Exercise Reps/ Time Weight/ Level Comments Completed today    Manual          STM  5'  Right lateral/posterior upper arm             Supine        PROM  20x ea    All planes  x   AAROM flex/abd/ER  20x ea cane  x   Supine 2 hands clasped shld flex 15x   x   Chest Press  10x 2  With cane  New     Supine shld Abd/add 10x 2  Mod A x   Supine shld ER/IR 15x 2  Added 4/8 x   Sidelying shld ER 10x  2  Added 4/8 x   Supine serratus 15x  Min A to stabilize arm Increase reps 6/5 x   Supine triceps 20x AROM Jorge to stabilize;   Increase reps  6/5 x   Rhythmic stabilization 20\" x 2      Side

## 2024-07-02 ENCOUNTER — HOSPITAL ENCOUNTER (OUTPATIENT)
Dept: PHYSICAL THERAPY | Age: 74
Setting detail: THERAPIES SERIES
Discharge: HOME | End: 2024-07-02

## 2024-07-02 NOTE — FLOWSHEET NOTE
[x] [facility]   Outpatient Rehabilitation & Therapy  [address]  P: [phone]   F: [phone]     Physical Therapy Cancel/No Show note    Date: 2024  Patient: Sonido Willard  : 1950  MRN: [id number]    Visit Count:   Cancels/No Shows to date:     For today's appointment patient:    [x]  Cancelled    [] Rescheduled appointment    [] No-show     Reason given by patient:    [x]  Patient ill    []  Conflicting appointment    [] No transportation      [] Conflict with work    [] No reason given    [] Weather related    [] COVID-19    [] Other:      Comments:        [x] Next appointment was confirmed    Electronically signed by: Osiel Torres PT

## 2024-07-08 ENCOUNTER — HOSPITAL ENCOUNTER (OUTPATIENT)
Dept: PHYSICAL THERAPY | Age: 74
Setting detail: THERAPIES SERIES
Discharge: HOME | End: 2024-07-08
Payer: COMMERCIAL

## 2024-07-08 PROCEDURE — 97110 THERAPEUTIC EXERCISES: CPT

## 2024-07-08 NOTE — PROGRESS NOTES
home      Access Code: [email or web address]  URL: [email or web address]  Date: 04/08/2024  Prepared by: Osiel Torres    Exercises    - Sidelying Shoulder External Rotation  - 2 x daily - 7 x weekly - 1 sets - 10 reps  - Supine Shoulder External and Internal Rotation in Abduction with Dumbbell  - 2 x daily - 7 x weekly - 1 sets - 10 reps    Access Code: [email or web address]  URL: [email or web address]  Date: 03/18/2024  Prepared by: Osiel Torres     Exercises  - Standing Shoulder Shrugs  - 3 x daily - 7 x weekly - 1 sets - 10 reps  - Seated Scapular Retraction  - 3 x daily - 7 x weekly - 1 sets - 10 reps  - Circular Shoulder Pendulum with Table Support  - 3 x daily - 7 x weekly - 1 sets - 10 reps  - Standing Bent Over Single Arm Scapular Row with Table Support  - 3 x daily - 7 x weekly - 1 sets - 10 reps  - Standing Elbow Flexion Extension AROM  - 3 x daily - 7 x weekly - 1 sets - 10 reps  - Supine Shoulder Flexion AAROM with Hands Clasped  - 2 x daily - 7 x weekly - 1 sets - 10 reps  - Seated Shoulder Flexion Towel Slide at Table Top  - 2 x daily - 7 x weekly - 1 sets - 10 reps    Plan: [x] Continue per plan of care.   [] Other:      Treatment Charges: Mins Units   []  Modalities  ICE     [x]  Ther Exercise 40 3   [x]  Manual Therapy 5 0   []  Ther Activities     []  Aquatics     []  Neuromuscular     [] Vasocompression     [] Gait Training     [] Dry needling        [] 1 or 2 muscles        [] 3 or more muscles     []  Other ice pack      Total Billable time 45 3     Time In: 9:06  am             Time Out: 9:56  am     Electronically signed by:  Osiel Torres, PT

## 2024-07-09 DIAGNOSIS — Z96.611 S/P REVERSE TOTAL SHOULDER ARTHROPLASTY, RIGHT: ICD-10-CM

## 2024-07-09 RX ORDER — DICLOFENAC SODIUM 75 MG/1
TABLET, DELAYED RELEASE ORAL
Qty: 28 TABLET | Refills: 0 | OUTPATIENT
Start: 2024-07-09

## 2024-07-10 ENCOUNTER — HOSPITAL ENCOUNTER (OUTPATIENT)
Dept: PHYSICAL THERAPY | Age: 74
Setting detail: THERAPIES SERIES
Discharge: HOME | End: 2024-07-10
Payer: COMMERCIAL

## 2024-07-10 PROCEDURE — 97110 THERAPEUTIC EXERCISES: CPT

## 2024-07-10 NOTE — PROGRESS NOTES
[facility]   Outpatient Rehabilitation & Therapy  [address]  P: [phone]   F: [phone]    Physical Therapy Daily Treatment Note      Date:  7/10/2024  Patient Name:  Sonido Willard    :  1950  MRN: [id number]  Physician: Cathi Armstrong MD                                Insurance: [employer];      Medical Diagnosis: Z96.611 (ICD-10-CM) - S/P reverse total shoulder arthroplasty, right; Right shoulder A/AAROM.  No strengthening at this time                     Rehab Codes: M25.511, M25.611, M62.511  Onset Date: 24                                  Next 's appt: 24  Visit# / total visits:   ( approved) (Count corrected 24) Cancels/No Shows:4  Progress Note completed 24 covering from 24--24  Approved  12 visits  from 24--24  (24 total)   Progress Note due by visit 30  Subjective:  Patient reports he feels better today.  He notes he started to feel better yesterday but notes his right knee and shoulder are less painful today and belly also feels better  Pain:  [x] Yes  [] No Location: R shoulder anterior/lateral Pain Rating: (0-10 scale) 6/10  Pain altered Tx:  [x] No  [] Yes  Action:    Objective:  Modalities:  Precautions: Work on A/AAROM; No strengthening yet  (per initial script);  Reverse TSA Right 24  PER Surgeon's PA 24  not to lift, push, or pull over 3-4 pounds;  needs to work aggressively to regain AROM  Exercises:  Exercise Reps/ Time Weight/ Level Comments Completed today    Manual          STM  5'  Right lateral/posterior upper arm             Supine        PROM  20x ea    All planes  x   AAROM flex/abd/ER  20x ea cane  x   Supine 2 hands clasped shld flex 15x   x   Chest Press  15x 2  Increased reps 7/10    Supine shld Abd/add 10x 2  Mod A x   Supine shld ER/IR 15x 2  Added 4/8 x   Sidelying shld ER 10x  2  Added 4/8 x   Supine serratus 15x  Min A to stabilize arm Increase reps 6/5 x   Supine triceps

## 2024-07-13 DIAGNOSIS — Z96.611 S/P REVERSE TOTAL SHOULDER ARTHROPLASTY, RIGHT: ICD-10-CM

## 2024-07-15 ENCOUNTER — HOSPITAL ENCOUNTER (OUTPATIENT)
Dept: PHYSICAL THERAPY | Age: 74
Setting detail: THERAPIES SERIES
Discharge: HOME | End: 2024-07-15
Payer: COMMERCIAL

## 2024-07-15 RX ORDER — DICLOFENAC SODIUM 75 MG/1
TABLET, DELAYED RELEASE ORAL
Qty: 28 TABLET | Refills: 0 | OUTPATIENT
Start: 2024-07-15

## 2024-07-15 NOTE — FLOWSHEET NOTE
[x] [facility]   Outpatient Rehabilitation & Therapy  [address]  P: [phone]   F: [phone]     Physical Therapy Cancel/No Show note    Date: 7/15/2024  Patient: Sonido Willard  : 1950  MRN: [id number]    Visit Count:   Cancels/No Shows to date:     For today's appointment patient:    [x]  Cancelled    [] Rescheduled appointment    [] No-show     Reason given by patient:    []  Patient ill    []  Conflicting appointment    [] No transportation      [] Conflict with work    [] No reason given    [] Weather related    [] COVID-19    [x] Other:      Comments:  No reason given      [x] Next appointment was confirmed    Electronically signed by: Osiel Torres PT

## 2024-07-17 DIAGNOSIS — Z96.611 S/P REVERSE TOTAL SHOULDER ARTHROPLASTY, RIGHT: ICD-10-CM

## 2024-07-17 RX ORDER — DICLOFENAC SODIUM 75 MG/1
TABLET, DELAYED RELEASE ORAL
Qty: 28 TABLET | Refills: 0 | OUTPATIENT
Start: 2024-07-17

## 2024-07-18 ENCOUNTER — HOSPITAL ENCOUNTER (OUTPATIENT)
Dept: PHYSICAL THERAPY | Age: 74
Setting detail: THERAPIES SERIES
Discharge: HOME | End: 2024-07-18
Payer: COMMERCIAL

## 2024-07-18 PROCEDURE — 97110 THERAPEUTIC EXERCISES: CPT

## 2024-07-18 NOTE — FLOWSHEET NOTE
[facility]   Outpatient Rehabilitation & Therapy  [address] [facility] [address]  P: [phone]   F: [phone]    Physical Therapy Daily Treatment Note      Date:  2024  Patient Name:  Sonido Willard    :  1950  MRN: [id number]  Physician: Cathi Armstrong MD                                Insurance: [employer];      Medical Diagnosis: Z96.611 (ICD-10-CM) - S/P reverse total shoulder arthroplasty, right; Right shoulder A/AAROM.  No strengthening at this time                     Rehab Codes: M25.511, M25.611, M62.511  Onset Date: 24                                  Next 's appt: 24  Visit# / total visits:   (10/12 approved) (Count corrected 24) Cancels/No Shows:4  Progress Note completed 24 covering from 24--24  Approved  12 visits  from 24--24  (24 total)   Progress Note due by visit 30  Subjective:  Patient reports he has been hurting all over this week.  He reports his right knee has been very sore;  Also c/o left heel and foot being sore  Pain:  [x] Yes  [] No Location: R shoulder anterior/lateral Pain Rating: (0-10 scale) 7/10  Pain altered Tx:  [x] No  [] Yes  Action:    Objective:  Modalities:  Precautions: Work on A/AAROM; No strengthening yet  (per initial script);  Reverse TSA Right 24  PER Surgeon's PA 24  not to lift, push, or pull over 3-4 pounds;  needs to work aggressively to regain AROM  Exercises:  Exercise Reps/ Time Weight/ Level Comments Completed today    Manual          STM  5'  Right lateral/posterior upper arm             Supine        PROM  20x ea    All planes  x   AAROM flex/abd/ER  20x ea cane  x   Supine 2 hands clasped shld flex 15x   x   Chest Press  15x 2  Increased reps 7/10    Supine shld Abd/add 10x 2  Mod A x   Supine shld ER/IR 15x 2  Added 4/8 x   Sidelying shld ER 10x  2  Added 4/8 x   Supine serratus 15x  Min A to stabilize arm Increase reps 6/5 x   Supine triceps 20x AROM Jorge to stabilize;

## 2024-07-22 ENCOUNTER — OFFICE VISIT (OUTPATIENT)
Dept: ORTHOPEDIC SURGERY | Age: 74
End: 2024-07-22
Payer: COMMERCIAL

## 2024-07-22 VITALS — WEIGHT: 177.03 LBS | HEIGHT: 65 IN | BODY MASS INDEX: 29.49 KG/M2 | RESPIRATION RATE: 16 BRPM

## 2024-07-22 DIAGNOSIS — Z96.611 S/P REVERSE TOTAL SHOULDER ARTHROPLASTY, RIGHT: Primary | ICD-10-CM

## 2024-07-22 PROCEDURE — 99213 OFFICE O/P EST LOW 20 MIN: CPT

## 2024-07-22 PROCEDURE — 1123F ACP DISCUSS/DSCN MKR DOCD: CPT

## 2024-07-22 NOTE — PROGRESS NOTES
Procedure: Right shoulder reverse total arthroplasty due to fracture  Date of Procedure: 1/30/2024    HPI: Sonido Willard is approximately 6 months status post the aforementioned procedure. he is doing well clinically and states that he has seen a significant improvement in his pain.  States that he is still having pain through the shoulder although he is seeing some improvement in his functionality.  he has been attending formal physical therapy for the past several months.  States that therapy is going well and he is continuing to see improvement every day.  He continues to deny having any fevers, chills, or sweats. states his pain is about a 7 out of 10 at this point when it is bad but significantly improved from 3 months ago.    Of note patient does state he has some right knee pain and believes that this was probably from the same fall that caused his right shoulder pain.  I did inform him that I am more than happy to see him back for reevaluation of the right knee if it does continue to bother him.    Physical examination:  Resp 16   Ht 1.651 m (5' 5\")   Wt 80.3 kg (177 lb 0.5 oz)   BMI 29.46 kg/m²   General Appearance: alert, well appearing, and in no distress  Mental Status: alert, oriented to person, place, and time  Evaluation of the right shoulder and upper extremity demonstrates sensation to be grossly intact light touch in all dermatomes and he has a 2+ radial pulse with brisk capillary refill in all his fingers.     ROM: (Degrees)    Right   A P       Elevation  75 105     Abduction  70 105     ER   30 60     IR   L4      90 abd/ER        90 abd/IR        Crepitation  No         Muscle strength:    Right          Deltoid   4      Supraspinatus  3      ER   2     IR   2       Special tests    Right   Rotator Cuff       y   Painful arc       n   Pain with ER        y   Neer's         y   Hawkin's        n   Drop Arm          he can actively flex and extend the wrist and flex, extend, abduct, and

## 2024-07-24 ENCOUNTER — HOSPITAL ENCOUNTER (OUTPATIENT)
Dept: PHYSICAL THERAPY | Age: 74
Setting detail: THERAPIES SERIES
Discharge: HOME | End: 2024-07-24
Payer: COMMERCIAL

## 2024-07-24 PROCEDURE — 97110 THERAPEUTIC EXERCISES: CPT

## 2024-07-24 NOTE — FLOWSHEET NOTE
[facility]   Outpatient Rehabilitation & Therapy  [address]  P: [phone]   F: [phone]    Physical Therapy Daily Treatment Note      Date:  2024  Patient Name:  Sonido Willard    :  1950  MRN: [id number]  Physician: Cathi Armstrong MD                                Insurance: [employer];      Medical Diagnosis: Z96.611 (ICD-10-CM) - S/P reverse total shoulder arthroplasty, right; Right shoulder A/AAROM.  No strengthening at this time                     Rehab Codes: M25.511, M25.611, M62.511  Onset Date: 24                                  Next 's appt: 24  Visit# / total visits:   ( approved) (Count corrected 24) Cancels/No Shows:40  Progress Note completed 24 covering from 24--24  Approved  12 visits  from 24--24  (24 total)   Progress Note due by visit 30  Subjective:  Patient reports he saw MD office 24 and he was told he is progressing as he should.  Pt reports he is still very sore in the shoulder.  Pt reports he is progressing slowly as ADLs and other activities are getting easier.  He reports he still needs to lift right arm with help of left arm to get up to wash hair and face, etc.  He notes he would like to lift arm better on it's own  (AROM)    Received new orders from surgeon to continue PT  Pain:  [x] Yes  [] No Location: R shoulder anterior/lateral Pain Rating: (0-10 scale) 10  Pain altered Tx:  [x] No  [] Yes  Action:    Objective:  Modalities:  Precautions: Work on A/AAROM; No strengthening yet  (per initial script);  Reverse TSA Right 24  PER Surgeon's PA 24  not to lift, push, or pull over 3-4 pounds;  needs to work aggressively to regain AROM  Exercises:  Exercise Reps/ Time Weight/ Level Comments Completed today    Manual          STM  5'  Right lateral/posterior upper arm             Supine        PROM  20x ea    All planes  x   AAROM flex/abd/ER  20x ea cane  x   Supine 2

## 2024-07-31 ENCOUNTER — HOSPITAL ENCOUNTER (OUTPATIENT)
Dept: PHYSICAL THERAPY | Age: 74
Setting detail: THERAPIES SERIES
Discharge: HOME | End: 2024-07-31
Payer: COMMERCIAL

## 2024-07-31 PROCEDURE — 97110 THERAPEUTIC EXERCISES: CPT

## 2024-07-31 NOTE — PROGRESS NOTES
Improving;  Still painful but pt can perform all ADLs  Patient to be independent with home exercise program as demonstrated by performance with correct form without cues. 7/8/24  Goal Met     LTG: (to be met in 36 treatments)   Improve right shoulder ROM to wfl to facilitate yard work and driving  Improve right shoulder strength to wfl to facilitate cleaning house  Improve SPADI score to 20% impaired at worst to facilitate yard work 7/31/24  Not Met but improved to 56/130 or 43% impaired 7/8/24 Not Met but improved to 77/130  or 59% impaired;  originally 75% impaired on 3/18/24     Patient goals:  Decrease pain;  improve use of shoulder    Pt. Education:  [x] Yes  [] No  [x] Reviewed Prior HEP/Ed  Method of Education: [x] Verbal  [x] Demo  [] Written  Comprehension of Education:  [x] Verbalizes understanding.  [] Demonstrates understanding.  [x] Needs review.  [] Demonstrates/verbalizes HEP/Ed previously given.       7/8/24  red Theraband given for home      Access Code: [email or web address]  URL: [email or web address]  Date: 04/08/2024  Prepared by: Osiel Torres    Exercises    - Sidelying Shoulder External Rotation  - 2 x daily - 7 x weekly - 1 sets - 10 reps  - Supine Shoulder External and Internal Rotation in Abduction with Dumbbell  - 2 x daily - 7 x weekly - 1 sets - 10 reps    Access Code: [email or web address]  URL: [email or web address]  Date: 03/18/2024  Prepared by: Osiel Torres     Exercises  - Standing Shoulder Shrugs  - 3 x daily - 7 x weekly - 1 sets - 10 reps  - Seated Scapular Retraction  - 3 x daily - 7 x weekly - 1 sets - 10 reps  - Circular Shoulder Pendulum with Table Support  - 3 x daily - 7 x weekly - 1 sets - 10 reps  - Standing Bent Over Single Arm Scapular Row with Table Support  - 3 x daily - 7 x weekly - 1 sets - 10 reps  - Standing Elbow Flexion Extension AROM  - 3 x daily - 7 x weekly - 1 sets - 10 reps  - Supine Shoulder Flexion AAROM with Hands Clasped  - 2 x daily - 7 x weekly - 1 sets - 10

## 2024-08-02 DIAGNOSIS — E11.9 TYPE 2 DIABETES MELLITUS WITHOUT COMPLICATION, WITHOUT LONG-TERM CURRENT USE OF INSULIN (HCC): ICD-10-CM

## 2024-08-02 RX ORDER — BLOOD SUGAR DIAGNOSTIC
STRIP MISCELLANEOUS
Qty: 100 EACH | Refills: 5 | Status: SHIPPED | OUTPATIENT
Start: 2024-08-02

## 2024-08-02 RX ORDER — LANCETS 33 GAUGE
1 EACH MISCELLANEOUS DAILY
Qty: 100 EACH | Refills: 3 | Status: SHIPPED | OUTPATIENT
Start: 2024-08-02

## 2024-08-14 ENCOUNTER — HOSPITAL ENCOUNTER (OUTPATIENT)
Dept: PHYSICAL THERAPY | Age: 74
Setting detail: THERAPIES SERIES
End: 2024-08-14
Payer: COMMERCIAL

## 2024-08-19 ENCOUNTER — HOSPITAL ENCOUNTER (OUTPATIENT)
Dept: PHYSICAL THERAPY | Age: 74
Setting detail: THERAPIES SERIES
End: 2024-08-19
Payer: COMMERCIAL

## 2024-08-28 ENCOUNTER — HOSPITAL ENCOUNTER (OUTPATIENT)
Dept: PHYSICAL THERAPY | Age: 74
Setting detail: THERAPIES SERIES
Discharge: HOME | End: 2024-08-28
Payer: COMMERCIAL

## 2024-08-28 PROCEDURE — 97110 THERAPEUTIC EXERCISES: CPT

## 2024-08-28 NOTE — FLOWSHEET NOTE
[facility]   Outpatient Rehabilitation & Therapy  [address]  P: [phone]   F: [phone]    Physical Therapy Daily Treatment Note      Date:  2024  Patient Name:  Sonido Willard    :  1950  MRN: [id number]  Physician: Cathi Armstrong MD                                Insurance: [email or web address];     24  12 more visits approved 24--10/1/24   Medical Diagnosis: Z96.611 (ICD-10-CM) - S/P reverse total shoulder arthroplasty, right; Right shoulder A/AAROM.  No strengthening at this time                     Rehab Codes: M25.511, M25.611, M62.511  Onset Date: 24                                  Next 's appt: 25  Visit# / total visits: 25  ( approved) (Count corrected 24) Cancels/No Shows:4/0  Progress Note completed 24 covering from 24--24  12 more visits approved 24--10/1/24   (36 total)   Progress Note due by visit 34  Subjective:  Pt reports he is sore all over today.  He reports right shoulder is still very sore.  He notes both knees have been sore lately  Pt reports he has been doing some yard work at home but most activities like that cause shoulder pain.  He reports getting dressed and washing hair is easier    Received new orders from surgeon to continue PT on 24  Pain:  [x] Yes  [] No Location: R shoulder anterior/lateral Pain Rating: (0-10 scale) 7/10  Pain altered Tx:  [x] No  [] Yes  Action:    Objective:  Modalities:  Precautions: Work on A/AAROM; No strengthening yet  (per initial script);  Reverse TSA Right 24  PER Surgeon's PA 24  not to lift, push, or pull over 3-4 pounds;  needs to work aggressively to regain AROM  Exercises:  Exercise Reps/ Time Weight/ Level COMPLETED  TODAY Comments   Manual          STM  5'  x Right lateral/posterior upper arm            Supine        PROM  20x ea    x All planes    AAROM flex/abd/ER  20x ea cane     Supine 2 hands clasped shld flex 15x  x    Chest  to 77/130  or 59% impaired;  originally 75% impaired on 3/18/24     Patient goals:  Decrease pain;  improve use of shoulder    Pt. Education:  [x] Yes  [] No  [x] Reviewed Prior HEP/Ed  Method of Education: [x] Verbal  [x] Demo  [] Written  Comprehension of Education:  [x] Verbalizes understanding.  [] Demonstrates understanding.  [x] Needs review.  [] Demonstrates/verbalizes HEP/Ed previously given.       7/8/24  red Theraband given for home      Access Code: [email or web address]  URL: [email or web address]  Date: 04/08/2024  Prepared by: Osiel Torres    Exercises    - Sidelying Shoulder External Rotation  - 2 x daily - 7 x weekly - 1 sets - 10 reps  - Supine Shoulder External and Internal Rotation in Abduction with Dumbbell  - 2 x daily - 7 x weekly - 1 sets - 10 reps    Access Code: [email or web address]  URL: [email or web address]  Date: 03/18/2024  Prepared by: Osiel Giest     Exercises  - Standing Shoulder Shrugs  - 3 x daily - 7 x weekly - 1 sets - 10 reps  - Seated Scapular Retraction  - 3 x daily - 7 x weekly - 1 sets - 10 reps  - Circular Shoulder Pendulum with Table Support  - 3 x daily - 7 x weekly - 1 sets - 10 reps  - Standing Bent Over Single Arm Scapular Row with Table Support  - 3 x daily - 7 x weekly - 1 sets - 10 reps  - Standing Elbow Flexion Extension AROM  - 3 x daily - 7 x weekly - 1 sets - 10 reps  - Supine Shoulder Flexion AAROM with Hands Clasped  - 2 x daily - 7 x weekly - 1 sets - 10 reps  - Seated Shoulder Flexion Towel Slide at Table Top  - 2 x daily - 7 x weekly - 1 sets - 10 reps    Plan: [x] Continue per plan of care.   [] Other:      Treatment Charges: Mins Units   []  Modalities  ICE     [x]  Ther Exercise 45 3   [x]  Manual Therapy 5 0   []  Ther Activities     []  Aquatics     []  Neuromuscular     [] Vasocompression     [] Gait Training     [] Dry needling        [] 1 or 2 muscles        [] 3 or more muscles     []  Other ice pack      Total Billable time 50 3     Time In: 06:19  pm

## 2024-08-29 ENCOUNTER — HOSPITAL ENCOUNTER (OUTPATIENT)
Age: 74
Discharge: HOME | End: 2024-08-29

## 2024-08-29 ENCOUNTER — OFFICE VISIT (OUTPATIENT)
Dept: ONCOLOGY | Age: 74
End: 2024-08-29
Payer: COMMERCIAL

## 2024-08-29 VITALS
SYSTOLIC BLOOD PRESSURE: 122 MMHG | DIASTOLIC BLOOD PRESSURE: 85 MMHG | BODY MASS INDEX: 29.45 KG/M2 | HEART RATE: 82 BPM | OXYGEN SATURATION: 97 % | RESPIRATION RATE: 18 BRPM | TEMPERATURE: 96.9 F | WEIGHT: 177 LBS

## 2024-08-29 DIAGNOSIS — C84.00 MYCOSIS FUNGOIDES, UNSPECIFIED BODY REGION (HCC): Primary | ICD-10-CM

## 2024-08-29 DIAGNOSIS — C91.10 CHRONIC LARGE GRANULAR LYMPHOCYTIC LEUKEMIA (HCC): ICD-10-CM

## 2024-08-29 DIAGNOSIS — C91.10 CHRONIC LARGE GRANULAR LYMPHOCYTIC LEUKEMIA (HCC): Primary | ICD-10-CM

## 2024-08-29 PROCEDURE — 3074F SYST BP LT 130 MM HG: CPT | Performed by: INTERNAL MEDICINE

## 2024-08-29 PROCEDURE — 99214 OFFICE O/P EST MOD 30 MIN: CPT | Performed by: INTERNAL MEDICINE

## 2024-08-29 PROCEDURE — 3079F DIAST BP 80-89 MM HG: CPT | Performed by: INTERNAL MEDICINE

## 2024-08-29 PROCEDURE — 1123F ACP DISCUSS/DSCN MKR DOCD: CPT | Performed by: INTERNAL MEDICINE

## 2024-08-29 PROCEDURE — 99211 OFF/OP EST MAY X REQ PHY/QHP: CPT | Performed by: INTERNAL MEDICINE

## 2024-08-29 RX ORDER — ACETAMINOPHEN 80 MG/1
80 TABLET, CHEWABLE ORAL EVERY 4 HOURS PRN
COMMUNITY

## 2024-08-29 NOTE — PROGRESS NOTES
++ Edematous macular skin rash over torso, upper and lower extremity  Hematologic/Lymphatic: negative for easy bruising, bleeding, lymphadenopathy, petechiae and swelling/edema   Endocrine: negative for heat or cold intolerance, tremor, weight changes, change in bowel habits and hair loss   Musculoskeletal: negative for myalgias, arthralgias, pain, joint swelling,and bone pain   Neurological: negative for headaches, dizziness, seizures, weakness, numbness     OBJECTIVE:         There were no vitals filed for this visit.     PHYSICAL EXAM:   General appearance - well appearing, no in pain or distress   Mental status - alert and cooperative   Eyes - pupils equal and reactive, extraocular eye movements intact   Ears - bilateral TM's and external ear canals normal   Mouth - mucous membranes moist, pharynx normal without lesions   Neck - supple, no significant adenopathy   Lymphatics - no palpable lymphadenopathy, no hepatosplenomegaly   Chest - clear to auscultation, no wheezes, rales or rhonchi, symmetric air entry   Heart - normal rate, regular rhythm, normal S1, S2, no murmurs, rubs, clicks or gallops   Abdomen - soft, nontender, nondistended, no masses or organomegaly   Neurological - alert, oriented, normal speech, no focal findings or movement disorder noted   Musculoskeletal - no joint tenderness, deformity or swelling   Extremities - peripheral pulses normal, no pedal edema, no clubbing or cyanosis   Skin - ++ Edematous macular skin rash over torso, upper and lower extremity    LABORATORY DATA:     Lab Results   Component Value Date    WBC 7.2 06/26/2024    HGB 13.1 06/26/2024    HCT 40.7 06/26/2024    MCV 96.7 06/26/2024     06/26/2024       Chemistry        Component Value Date/Time     02/08/2024 0914    K 3.6 (L) 02/08/2024 0914    CL 98 02/08/2024 0914    CO2 33 (H) 02/08/2024 0914    BUN 18 02/08/2024 0914    CREATININE 0.9 02/08/2024 0914        Component Value Date/Time    CALCIUM 9.3    46,XY[20].nuc   tiffany(D5S23,EGR1)x2[200],(CEP7,H9V100)x2[200],(T4H979,CEP8)x2[200],   (TEL20p,I25H492)x2[200]     Cytogenetic Diagnosis   A.     Normal male chromosome complement   B.      FISH: No evidence of -5/del(5q), -7/del(7q) or -20/del(20q)   C.      FISH: No evidence of +8     Interpretation:   Cytogenetic examination of twenty metaphase cells showed a normal   karyotype with no consistent numerical or structural chromosome   aberrations observed.       Fluorescence in situ hybridization (FISH) analysis was performed using   1) the LSI 5q EGR1 (5q31, [employer])/D5S23,N8H973 (5p15.2,   [employer]rumGreen) Dual Color DNA probe set ([employer]) which detects deletions of 5q31 containing the EGR1 locus and can be used to identify loss of chromosome 5; 2) the LSI S1G037 (7q31, [employer])/CEP7 (7 centromere, SpectrumGreen) Dual Color DNA probe set ([employer]) which detects deletions of 7q31 and may be used to identify loss of chromosome 7;  3) the LSI CEP 8 (SpectrumOrange)/Q5P960 (8p telomere, SpectrumGreen) probe combination which allows enumeration of chromosome 8 centromere and may be used to detect gain or loss of chromosome 8; and 4) the 20p TEL (20p13, SpectrumGreen)/LSI [id number] (20q12, SpectrumOrange) probe combination which detects deletion of 20q. Examination of 200 interphase nuclei with each probe revealed normal hybridization patterns.   Please note that this analysis does not eliminate the possibility of single gene defects, chromosomal mosaicism involving abnormal cell lines of low frequency, small structural chromosome abnormalities, or failure to sample any malignant clone(s) that may be present.     FLOW CYTOMETRIC ANALYSIS OF PERIPHERAL BLOOD PERFORMED AT [employer] (T-CELL CLONALITY BY FLOW CYTOMETRY OF TCR V-BETA)   REPORTS NO EVIDENCE OF A MONOCLONAL T-CELL POPULATION.          BONE MARROW, ASPIRATE, BIOPSY, AND CLOT SECTIONS DEMONSTRATING:          HYPERCELLULAR HEMATOPOIETIC MARROW

## 2024-09-04 ENCOUNTER — HOSPITAL ENCOUNTER (OUTPATIENT)
Dept: PHYSICAL THERAPY | Age: 74
Setting detail: THERAPIES SERIES
Discharge: HOME | End: 2024-09-04
Payer: COMMERCIAL

## 2024-09-04 PROCEDURE — 97110 THERAPEUTIC EXERCISES: CPT

## 2024-09-04 NOTE — FLOWSHEET NOTE
[facility]   Outpatient Rehabilitation & Therapy  [address]  P: [phone]   F: [phone]    Physical Therapy Daily Treatment Note      Date:  2024  Patient Name:  Sonido Willard    :  1950  MRN: [id number]  Physician: Cathi Armstrong MD                                Insurance: [employer];     24  12 more visits approved 24--10/1/24   Medical Diagnosis: Z96.611 (ICD-10-CM) - S/P reverse total shoulder arthroplasty, right; Right shoulder A/AAROM.  No strengthening at this time                     Rehab Codes: M25.511, M25.611, M62.511  Onset Date: 24                                  Next 's appt: 25  Visit# / total visits: 26/36  ( approved) (Count corrected 24) Cancels/No Shows:4/0  Progress Note completed 24 covering from 24--24  12 more visits approved 24--10/1/24   (36 total)   Progress Note due by visit 34  Subjective:  Pt reports the shoulder is a little less sore today.  He reports he can use it more, especially when down by his side.  He reports he is very weak when he tries to use the arm at shoulder height     Received new orders from surgeon to continue PT on 24  Pain:  [x] Yes  [] No Location: R shoulder anterior/lateral Pain Rating: (0-10 scale) 6/10  Pain altered Tx:  [x] No  [] Yes  Action:    Objective:  Modalities:  Precautions: Work on A/AAROM; No strengthening yet  (per initial script);  Reverse TSA Right 24  PER Surgeon's PA 24  not to lift, push, or pull over 3-4 pounds;  needs to work aggressively to regain AROM  Exercises:  Exercise Reps/ Time Weight/ Level COMPLETED  TODAY Comments   Manual          STM  5'  x Right lateral/posterior upper arm            Supine        PROM  20x ea    x All planes    AAROM flex/abd/ER  20x ea cane     Supine 2 hands clasped shld flex 15x  x    Chest Press  15x 2 2# wand x Increased weight    Supine shld Abd/add 10x 2   Mod A   Supine

## 2024-09-06 ENCOUNTER — OFFICE VISIT (OUTPATIENT)
Dept: INTERNAL MEDICINE CLINIC | Age: 74
End: 2024-09-06

## 2024-09-06 VITALS
WEIGHT: 174 LBS | SYSTOLIC BLOOD PRESSURE: 132 MMHG | HEART RATE: 80 BPM | BODY MASS INDEX: 28.99 KG/M2 | OXYGEN SATURATION: 97 % | HEIGHT: 65 IN | DIASTOLIC BLOOD PRESSURE: 78 MMHG

## 2024-09-06 DIAGNOSIS — R73.03 PREDIABETES: ICD-10-CM

## 2024-09-06 DIAGNOSIS — Z13.220 SCREENING FOR HYPERLIPIDEMIA: Primary | ICD-10-CM

## 2024-09-06 SDOH — ECONOMIC STABILITY: FOOD INSECURITY: WITHIN THE PAST 12 MONTHS, THE FOOD YOU BOUGHT JUST DIDN'T LAST AND YOU DIDN'T HAVE MONEY TO GET MORE.: NEVER TRUE

## 2024-09-06 SDOH — ECONOMIC STABILITY: FOOD INSECURITY: WITHIN THE PAST 12 MONTHS, YOU WORRIED THAT YOUR FOOD WOULD RUN OUT BEFORE YOU GOT MONEY TO BUY MORE.: NEVER TRUE

## 2024-09-06 SDOH — ECONOMIC STABILITY: INCOME INSECURITY: HOW HARD IS IT FOR YOU TO PAY FOR THE VERY BASICS LIKE FOOD, HOUSING, MEDICAL CARE, AND HEATING?: NOT HARD AT ALL

## 2024-09-06 ASSESSMENT — PATIENT HEALTH QUESTIONNAIRE - PHQ9
SUM OF ALL RESPONSES TO PHQ QUESTIONS 1-9: 0
SUM OF ALL RESPONSES TO PHQ QUESTIONS 1-9: 0
1. LITTLE INTEREST OR PLEASURE IN DOING THINGS: NOT AT ALL
SUM OF ALL RESPONSES TO PHQ9 QUESTIONS 1 & 2: 0
SUM OF ALL RESPONSES TO PHQ QUESTIONS 1-9: 0
SUM OF ALL RESPONSES TO PHQ QUESTIONS 1-9: 0
2. FEELING DOWN, DEPRESSED OR HOPELESS: NOT AT ALL

## 2024-09-06 ASSESSMENT — LIFESTYLE VARIABLES
HOW MANY STANDARD DRINKS CONTAINING ALCOHOL DO YOU HAVE ON A TYPICAL DAY: PATIENT DOES NOT DRINK
HOW OFTEN DO YOU HAVE A DRINK CONTAINING ALCOHOL: NEVER

## 2024-09-06 NOTE — PROGRESS NOTES
Visit Information    Have you changed or started any medications since your last visit including any over-the-counter medicines, vitamins, or herbal medicines? no   Are you having any side effects from any of your medications? -  no  Have you stopped taking any of your medications? Is so, why? -  no    Have you seen any other physician or provider since your last visit? Yes - Records Obtained  Have you had any other diagnostic tests since your last visit? No  Have you been seen in the emergency room and/or had an admission to a hospital since we last saw you? No  Have you had your routine dental cleaning in the past 6 months? yes -     Have you activated your [employer] account? If not, what are your barriers? Yes     Patient Care Team:  Giovanna Bowers MD as PCP - General (Internal Medicine)  Jimmy Nair MD as PCP - Internal Medicine (Otolaryngology)  Giovanna Bowers MD as PCP - Empaneled Provider  Kvng Mccauley MD as Consulting Physician (Gastroenterology)  Ignacio Narayanan MD as Consulting Physician (Hematology and Oncology)  Nabila Diaz RN as Registered Nurse (Oncology)    Medical History Review  Past Medical, Family, and Social History reviewed and does contribute to the patient presenting condition    Health Maintenance   Topic Date Due    Diabetic retinal exam  Never done    Shingles vaccine (1 of 2) Never done    Respiratory Syncytial Virus (RSV) Pregnant or age 60 yrs+ (1 - 1-dose 60+ series) Never done    Diabetic foot exam  05/24/2023    Annual Wellness Visit (Medicare Advantage)  01/01/2024    Flu vaccine (1) 08/01/2024    COVID-19 Vaccine (5 - 2023-24 season) 09/01/2024    Diabetic Alb to Cr ratio (uACR) test  09/12/2024    Lipids  09/12/2024    DTaP/Tdap/Td vaccine (1 - Tdap) 12/05/2026 (Originally 8/30/1970)    Depression Screen  01/29/2025    GFR test (Diabetes, CKD 3-4, OR last GFR 15-59)  02/08/2025    A1C test (Diabetic or Prediabetic)  06/26/2025    Colorectal Cancer Screen

## 2024-09-06 NOTE — PROGRESS NOTES
[facility]  [address] [address]  Dept: [phone]  Dept Fax: [phone]    Office Progress/Follow Up Note  Date of patient's visit: 9/6/2024  Patient's Name:  Sonido Willard YOB: 1950            Patient Care Team:  Giovanna Bowers MD as PCP - General (Internal Medicine)  Jimmy Nair MD as PCP - Internal Medicine (Otolaryngology)  Giovanna Bowers MD as PCP - Empaneled Provider  Kvng Mccauley MD as Consulting Physician (Gastroenterology)  Ignacio Narayanan MD as Consulting Physician (Hematology and Oncology)  Nabila Diaz RN as Registered Nurse (Oncology)    REASON FOR VISIT: awv    HISTORY OF PRESENT ILLNESS:      Chief Complaint   Patient presents with    Medicare AWV        History was obtained from the patient. Sonido Willard is a 74 y.o. is here for an annual wellness visit.    Doing well, no  acute concerns.    Patient Active Problem List   Diagnosis    Essential hypertension    Hypothyroidism    Neutropenia (HCC)    Iron deficiency    History of colon polyps    Type 2 diabetes mellitus without complication, without long-term current use of insulin (HCC)    Prominent ileocecal valve    Colon polyps    Pharyngitis    Lymphoid hyperplasia    Malignant immunoproliferative disease (HCC)     Bone marrow hyperplasia    Lymphoproliferative disorder (HCC)    Mature NK/T-cell lymphoma (HCC)       Health Maintenance Due   Topic Date Due    Diabetic retinal exam  Never done    Shingles vaccine (1 of 2) Never done    Respiratory Syncytial Virus (RSV) Pregnant or age 60 yrs+ (1 - 1-dose 60+ series) Never done    Diabetic foot exam  05/24/2023    Flu vaccine (1) 08/01/2024    COVID-19 Vaccine (5 - 2023-24 season) 09/01/2024    Diabetic Alb to Cr ratio (uACR) test  09/12/2024    Lipids  09/12/2024       Allergies   Allergen Reactions    Morphine Other (See Comments)     Veins in arms turned blue         MEDICATIONS:     Current Outpatient

## 2024-09-11 ENCOUNTER — HOSPITAL ENCOUNTER (OUTPATIENT)
Dept: PHYSICAL THERAPY | Age: 74
Setting detail: THERAPIES SERIES
Discharge: HOME | End: 2024-09-11
Payer: COMMERCIAL

## 2024-09-11 PROCEDURE — 97110 THERAPEUTIC EXERCISES: CPT

## 2024-09-18 ENCOUNTER — HOSPITAL ENCOUNTER (OUTPATIENT)
Dept: PHYSICAL THERAPY | Age: 74
Setting detail: THERAPIES SERIES
Discharge: HOME | End: 2024-09-18
Payer: COMMERCIAL

## 2024-09-25 ENCOUNTER — HOSPITAL ENCOUNTER (OUTPATIENT)
Dept: PHYSICAL THERAPY | Age: 74
Setting detail: THERAPIES SERIES
Discharge: HOME | End: 2024-09-25
Payer: COMMERCIAL

## 2024-09-30 ENCOUNTER — HOSPITAL ENCOUNTER (OUTPATIENT)
Dept: PHYSICAL THERAPY | Age: 74
Setting detail: THERAPIES SERIES
Discharge: HOME | End: 2024-09-30
Payer: COMMERCIAL

## 2024-09-30 NOTE — FLOWSHEET NOTE
[x] [facility]   Outpatient Rehabilitation & Therapy  [address]  P: [phone]   F: [phone]     Physical Therapy Cancel/No Show note    Date: 2024  Patient: Sonido Willard  : 1950  MRN: [id number]    Visit Count:   Cancels/No Shows to date:     For today's appointment patient:    [x]  Cancelled    [] Rescheduled appointment    [] No-show     Reason given by patient:    []  Patient ill    []  Conflicting appointment    [] No transportation      [] Conflict with work    [] No reason given    [] Weather related    [] COVID-19    [x] Other:      Comments:  No reason given      [x] Next appointment was confirmed    Electronically signed by: Osiel Torres PT

## 2024-10-16 ENCOUNTER — HOSPITAL ENCOUNTER (OUTPATIENT)
Dept: PHYSICAL THERAPY | Age: 74
Setting detail: THERAPIES SERIES
Discharge: HOME | End: 2024-10-16
Payer: COMMERCIAL

## 2024-10-16 PROCEDURE — 97110 THERAPEUTIC EXERCISES: CPT

## 2024-10-16 NOTE — PROGRESS NOTES
cues. 7/8/24  Goal Met     LTG: (to be met in 36 treatments)   Improve right shoulder ROM to wfl to facilitate yard work and driving  10/16/24 Not Met but improving  Improve right shoulder strength to wfl to facilitate cleaning house 10/16/24 Progressing  Improve SPADI score to 20% impaired at worst to facilitate yard work   10/16/24  scored 69/130 or 53% impaired;  7/31/24  Not Met but improved to 56/130 or 43% impaired 7/8/24 Not Met but improved to 77/130  or 59% impaired;  originally 75% impaired on 3/18/24     Patient goals:  Decrease pain;  improve use of shoulder    Pt. Education:  [x] Yes  [] No  [x] Reviewed Prior HEP/Ed  Method of Education: [x] Verbal  [x] Demo  [] Written  Comprehension of Education:  [x] Verbalizes understanding.  [] Demonstrates understanding.  [x] Needs review.  [] Demonstrates/verbalizes HEP/Ed previously given.       7/8/24  red [employer] given for home      Access Code: [email or web address]  URL: [email or web address]  Date: 04/08/2024  Prepared by: Osiel Torres    Exercises    - Sidelying Shoulder External Rotation  - 2 x daily - 7 x weekly - 1 sets - 10 reps  - Supine Shoulder External and Internal Rotation in Abduction with Dumbbell  - 2 x daily - 7 x weekly - 1 sets - 10 reps    Access Code: [email or web address]  URL: [email or web address]  Date: 03/18/2024  Prepared by: Osiel Torres     Exercises  - Standing Shoulder Shrugs  - 3 x daily - 7 x weekly - 1 sets - 10 reps  - Seated Scapular Retraction  - 3 x daily - 7 x weekly - 1 sets - 10 reps  - Circular Shoulder Pendulum with Table Support  - 3 x daily - 7 x weekly - 1 sets - 10 reps  - Standing Bent Over Single Arm Scapular Row with Table Support  - 3 x daily - 7 x weekly - 1 sets - 10 reps  - Standing Elbow Flexion Extension AROM  - 3 x daily - 7 x weekly - 1 sets - 10 reps  - Supine Shoulder Flexion AAROM with Hands Clasped  - 2 x daily - 7 x weekly - 1 sets - 10 reps  - Seated Shoulder Flexion Towel Slide at Table Top  - 2 x daily - 7

## 2024-10-16 NOTE — CARE COORDINATION
[] [facility]  Outpatient Rehabilitation &  Therapy  [address]  [phone]  [phone] [] [facility]  Outpatient Rehabilitation &  Therapy  [address]  P: [phone]  F: [phone] [] [facility]  Outpatient Rehabilitation &  Therapy  [address]  P: [phone]  F: [phone] [] [facility]  Outpatient Rehabilitation &  Therapy  [address]  [phone]  [phone] [] [facility]  Outpatient Rehabilitation &  Therapy  [address] [place]   P: [phone]  F: [phone]  [] [facility]  Outpatient Rehabilitation &  Therapy  [address]  P: [phone]  F: [phone] [] [facility]  Outpatient Rehabilitation &  Therapy  [address]  P: [phone]  F: [phone] [] [facility]  Outpatient Rehabilitation &  Therapy  [address]  P: [phone]  F: [phone] [] [facility]  Outpatient Rehabilitation &  Therapy  [address]  P: [phone]  F: [phone]  [x] [facility] Outpatient Rehabilitation &  Therapy  [address]  P: [phone]  F: [phone]     THERAPY RESPONSIBILITY OF CARE TRANSFER FORM       PATIENT NAME: Sonido Willard  MRN: [id number]   : 1950      TRANSFERRING FACILITY:    [] [facility]   [x] [facility]   [] [facility]   [] [facility]   [] [facility] [] [place]   [] [facility]  [] [facility]  [] [facility]   [] [place]  [] [place]   [] Other:          ACCEPTING FACILITY  [] [place]   [x] [facility]   [] [facility]   [] [facility]   [] [facility] [] [place]   [] [facility]  [] [facility]  [] [facility]   [] [place]  [] [facility]   [] Other:         REASON FOR TRANSFER: Primary PT is having

## 2024-10-23 ENCOUNTER — HOSPITAL ENCOUNTER (OUTPATIENT)
Dept: PHYSICAL THERAPY | Age: 74
Setting detail: THERAPIES SERIES
Discharge: HOME | End: 2024-10-23
Payer: COMMERCIAL

## 2024-10-23 PROCEDURE — 97140 MANUAL THERAPY 1/> REGIONS: CPT

## 2024-10-23 PROCEDURE — 97110 THERAPEUTIC EXERCISES: CPT

## 2024-10-23 NOTE — FLOWSHEET NOTE
reps  - Seated Shoulder Flexion Towel Slide at Table Top  - 2 x daily - 7 x weekly - 1 sets - 10 reps    Plan: [x] Continue per plan of care.   [] Other:      Treatment Charges: Mins Units   []  Modalities  ICE     [x]  Ther Exercise 36 2   [x]  Manual Therapy 10 1   []  Ther Activities     []  Aquatics     []  Neuromuscular     [] Vasocompression     [] Gait Training     [] Dry needling        [] 1 or 2 muscles        [] 3 or more muscles     []  Other ice pack      Total Billable time 46 3     Time In: 1817            Time Out: 1903    Electronically signed by:  Kourtney Cartagena PTA

## 2024-10-30 ENCOUNTER — HOSPITAL ENCOUNTER (OUTPATIENT)
Dept: PHYSICAL THERAPY | Age: 74
Setting detail: THERAPIES SERIES
Discharge: HOME | End: 2024-10-30
Payer: COMMERCIAL

## 2024-10-30 NOTE — FLOWSHEET NOTE
[facility]   Outpatient Rehabilitation & Therapy  [address]  P: [phone]   F: [phone]     Physical Therapy Cancel/No Show note    Date: 10/30/2024  Patient: Sonido Willard  : 1950  MRN: [id number]    Visit Count:   Cancels/No Shows to date:     For today's appointment patient:    [x]  Cancelled    [] Rescheduled appointment    [] No-show     Reason given by patient:    []  Patient ill    []  Conflicting appointment    [] No transportation      [] Conflict with work    [] No reason given    [] Weather related    [] COVID-19    [x] Other:      Comments:  Per , pt called to cancel stating he would make it here by 6:45PM appt time at 6:15PM.  Appt was cx       [x] Next appointment was confirmed with     Electronically signed by: Kourtney Cartagena PTA

## 2024-11-04 DIAGNOSIS — N52.9 ERECTILE DYSFUNCTION, UNSPECIFIED ERECTILE DYSFUNCTION TYPE: ICD-10-CM

## 2024-11-04 RX ORDER — SILDENAFIL 50 MG/1
50 TABLET, FILM COATED ORAL PRN
Qty: 6 TABLET | Refills: 0 | Status: SHIPPED | OUTPATIENT
Start: 2024-11-04

## 2024-11-06 ENCOUNTER — HOSPITAL ENCOUNTER (OUTPATIENT)
Dept: PHYSICAL THERAPY | Age: 74
Setting detail: THERAPIES SERIES
Discharge: HOME | End: 2024-11-06

## 2024-11-06 NOTE — FLOWSHEET NOTE
[facility]   Outpatient Rehabilitation & Therapy  [address]  P: [phone]   F: [phone]     Physical Therapy Cancel/No Show note    Date: 2024  Patient: Sonido Willard  : 1950  MRN: [id number]    Visit Count:   Cancels/No Shows to date:     For today's appointment patient:    [x]  Cancelled    [] Rescheduled appointment    [] No-show     Reason given by patient:    []  Patient ill    []  Conflicting appointment    [] No transportation      [] Conflict with work    [x] No reason given    [] Weather related    [] COVID-19    [x] Other:      Comments:  States he will call back to reschedule soon.       [] Next appointment was confirmed with     Electronically signed by: Ry Bergeron PTA

## 2024-12-05 DIAGNOSIS — E11.9 TYPE 2 DIABETES MELLITUS WITHOUT COMPLICATION, WITHOUT LONG-TERM CURRENT USE OF INSULIN (HCC): ICD-10-CM

## 2024-12-05 RX ORDER — ENALAPRIL MALEATE 20 MG/1
20 TABLET ORAL 2 TIMES DAILY
Qty: 180 TABLET | Refills: 5 | Status: SHIPPED | OUTPATIENT
Start: 2024-12-05

## 2024-12-19 DIAGNOSIS — C91.10 CHRONIC LARGE GRANULAR LYMPHOCYTIC LEUKEMIA (HCC): ICD-10-CM

## 2024-12-19 RX ORDER — METHOTREXATE 2.5 MG/1
TABLET ORAL
Qty: 96 TABLET | Refills: 3 | Status: SHIPPED | OUTPATIENT
Start: 2024-12-19

## 2024-12-20 ENCOUNTER — HOSPITAL ENCOUNTER (OUTPATIENT)
Age: 74
Setting detail: SPECIMEN
Discharge: HOME | End: 2024-12-20

## 2024-12-20 DIAGNOSIS — R73.03 PREDIABETES: ICD-10-CM

## 2024-12-20 DIAGNOSIS — Z13.220 SCREENING FOR HYPERLIPIDEMIA: ICD-10-CM

## 2024-12-20 DIAGNOSIS — C84.00 MYCOSIS FUNGOIDES, UNSPECIFIED BODY REGION (HCC): Primary | ICD-10-CM

## 2024-12-20 LAB
CHOLEST SERPL-MCNC: 160 MG/DL (ref 0–199)
CHOLESTEROL/HDL RATIO: 1.9
CREAT UR-MCNC: 153 MG/DL (ref 39–259)
HDLC SERPL-MCNC: 83 MG/DL
LDLC SERPL CALC-MCNC: 65 MG/DL (ref 0–100)
MICROALBUMIN UR-MCNC: <12 MG/L (ref 0–20)
MICROALBUMIN/CREAT UR-RTO: NORMAL MCG/MG CREAT (ref 0–17)
TRIGL SERPL-MCNC: 60 MG/DL
VLDLC SERPL CALC-MCNC: 12 MG/DL (ref 1–30)

## 2024-12-23 ENCOUNTER — HOSPITAL ENCOUNTER (OUTPATIENT)
Age: 74
Setting detail: SPECIMEN
Discharge: HOME | End: 2024-12-23

## 2024-12-23 DIAGNOSIS — C84.00 MYCOSIS FUNGOIDES, UNSPECIFIED BODY REGION (HCC): ICD-10-CM

## 2024-12-23 LAB
ALBUMIN SERPL-MCNC: 4.1 G/DL (ref 3.5–5.2)
ALBUMIN/GLOB SERPL: 1.5 {RATIO} (ref 1–2.5)
ALP SERPL-CCNC: 115 U/L (ref 40–129)
ALT SERPL-CCNC: 23 U/L (ref 10–50)
ANION GAP SERPL CALCULATED.3IONS-SCNC: 10 MMOL/L (ref 9–16)
AST SERPL-CCNC: 24 U/L (ref 10–50)
BASOPHILS # BLD: 0.04 K/UL (ref 0–0.2)
BASOPHILS NFR BLD: 1 % (ref 0–2)
BILIRUB SERPL-MCNC: 0.4 MG/DL (ref 0–1.2)
BUN SERPL-MCNC: 16 MG/DL (ref 8–23)
CALCIUM SERPL-MCNC: 9.9 MG/DL (ref 8.6–10.4)
CHLORIDE SERPL-SCNC: 101 MMOL/L (ref 98–107)
CO2 SERPL-SCNC: 30 MMOL/L (ref 20–31)
CREAT SERPL-MCNC: 1 MG/DL (ref 0.7–1.2)
EOSINOPHIL # BLD: 0.07 K/UL (ref 0–0.44)
EOSINOPHILS RELATIVE PERCENT: 1 % (ref 1–4)
ERYTHROCYTE [DISTWIDTH] IN BLOOD BY AUTOMATED COUNT: 13.6 % (ref 11.8–14.4)
GFR, ESTIMATED: 79 ML/MIN/1.73M2
GLUCOSE SERPL-MCNC: 136 MG/DL (ref 74–99)
HCT VFR BLD AUTO: 44.9 % (ref 40.7–50.3)
HGB BLD-MCNC: 14.2 G/DL (ref 13–17)
IMM GRANULOCYTES # BLD AUTO: 0.03 K/UL (ref 0–0.3)
IMM GRANULOCYTES NFR BLD: 0 %
LYMPHOCYTES NFR BLD: 0.94 K/UL (ref 1.1–3.7)
LYMPHOCYTES RELATIVE PERCENT: 14 % (ref 24–43)
MCH RBC QN AUTO: 31 PG (ref 25.2–33.5)
MCHC RBC AUTO-ENTMCNC: 31.6 G/DL (ref 28.4–34.8)
MCV RBC AUTO: 98 FL (ref 82.6–102.9)
MONOCYTES NFR BLD: 0.63 K/UL (ref 0.1–1.2)
MONOCYTES NFR BLD: 9 % (ref 3–12)
NEUTROPHILS NFR BLD: 75 % (ref 36–65)
NEUTS SEG NFR BLD: 5.25 K/UL (ref 1.5–8.1)
NRBC BLD-RTO: 0 PER 100 WBC
PLATELET # BLD AUTO: 149 K/UL (ref 138–453)
PMV BLD AUTO: 10.9 FL (ref 8.1–13.5)
POTASSIUM SERPL-SCNC: 3.8 MMOL/L (ref 3.7–5.3)
PROT SERPL-MCNC: 6.8 G/DL (ref 6.6–8.7)
RBC # BLD AUTO: 4.58 M/UL (ref 4.21–5.77)
SODIUM SERPL-SCNC: 141 MMOL/L (ref 136–145)
WBC OTHER # BLD: 7 K/UL (ref 3.5–11.3)

## 2025-02-19 RX ORDER — HYDROCHLOROTHIAZIDE 25 MG/1
25 TABLET ORAL DAILY
Qty: 90 TABLET | Refills: 3 | Status: SHIPPED | OUTPATIENT
Start: 2025-02-19

## 2025-03-23 SDOH — HEALTH STABILITY: PHYSICAL HEALTH: ON AVERAGE, HOW MANY DAYS PER WEEK DO YOU ENGAGE IN MODERATE TO STRENUOUS EXERCISE (LIKE A BRISK WALK)?: 0 DAYS

## 2025-03-25 ENCOUNTER — OFFICE VISIT (OUTPATIENT)
Dept: ONCOLOGY | Age: 75
End: 2025-03-25
Payer: COMMERCIAL

## 2025-03-25 ENCOUNTER — TELEPHONE (OUTPATIENT)
Dept: ONCOLOGY | Age: 75
End: 2025-03-25

## 2025-03-25 VITALS
SYSTOLIC BLOOD PRESSURE: 145 MMHG | HEART RATE: 88 BPM | WEIGHT: 183.7 LBS | OXYGEN SATURATION: 96 % | RESPIRATION RATE: 18 BRPM | DIASTOLIC BLOOD PRESSURE: 96 MMHG | BODY MASS INDEX: 30.57 KG/M2 | TEMPERATURE: 96.9 F

## 2025-03-25 DIAGNOSIS — C88.90: Primary | ICD-10-CM

## 2025-03-25 PROCEDURE — 1123F ACP DISCUSS/DSCN MKR DOCD: CPT | Performed by: INTERNAL MEDICINE

## 2025-03-25 PROCEDURE — 1159F MED LIST DOCD IN RCRD: CPT | Performed by: INTERNAL MEDICINE

## 2025-03-25 PROCEDURE — 3077F SYST BP >= 140 MM HG: CPT | Performed by: INTERNAL MEDICINE

## 2025-03-25 PROCEDURE — 99204 OFFICE O/P NEW MOD 45 MIN: CPT | Performed by: INTERNAL MEDICINE

## 2025-03-25 PROCEDURE — 99211 OFF/OP EST MAY X REQ PHY/QHP: CPT | Performed by: INTERNAL MEDICINE

## 2025-03-25 PROCEDURE — 1126F AMNT PAIN NOTED NONE PRSNT: CPT | Performed by: INTERNAL MEDICINE

## 2025-03-25 PROCEDURE — 1160F RVW MEDS BY RX/DR IN RCRD: CPT | Performed by: INTERNAL MEDICINE

## 2025-03-25 PROCEDURE — 3080F DIAST BP >= 90 MM HG: CPT | Performed by: INTERNAL MEDICINE

## 2025-03-25 NOTE — PROGRESS NOTES
immunoproliferative disease.   3. Focal uptake in the right perianal region with associated mild soft tissue   prominence, please correlate with direct physical examination.   4. Chronic findings as described including cholelithiasis.     ASSESSMENT:    Mr Willard is 74 y.o.  YOM with chronic neutropenia.  Other work-up including at [facility] suggested adult T-cell lymphoma.   He was started on methotrexate and so far responded very well.   Neutropenia resolved.  His fatigue has improved  Patient's questions were sought and answered to his satisfaction.  He agreed with the plan.  PLAN:   I reviewed recent lab work, discussed diagnosis, prognosis and treatment recommendations   His WBC, hemoglobin and platelets are within normal limit   Continue methotrexate weekly   Appears to be in remission   Return to clinic in 6 months with labs prior or earlier if needed     I spent more than 40 minutes examining, evaluating, reviewing data and counseling the patient.  Greater than 50% of that time was spent face-to-face with the patient in counseling and coordinating her care.    Ignacio Narayanan MD  Hematology/Oncology    CC: Giovanna Bowers MD

## 2025-03-25 NOTE — TELEPHONE ENCOUNTER
Instructions   from Dr. Ignacio Narayanan MD    RTc in 6 months w labs    Labs ordered today  CBC with Auto Differential  Complete this on or around 3/25/2025.  CBC with Auto Differential  Complete this on or around 3/25/2025.  Comprehensive Metabolic Panel  Complete this on or around 3/25/2025.  Comprehensive Metabolic Panel  Complete this on or around 3/25/2025.    Next appointment scheduled for 9/23/2025 at 4pm. Pt given AVS and lab orders.

## 2025-03-26 ENCOUNTER — OFFICE VISIT (OUTPATIENT)
Dept: INTERNAL MEDICINE CLINIC | Age: 75
End: 2025-03-26
Payer: COMMERCIAL

## 2025-03-26 VITALS
WEIGHT: 185 LBS | SYSTOLIC BLOOD PRESSURE: 124 MMHG | DIASTOLIC BLOOD PRESSURE: 74 MMHG | HEIGHT: 65 IN | HEART RATE: 96 BPM | OXYGEN SATURATION: 97 % | BODY MASS INDEX: 30.82 KG/M2

## 2025-03-26 DIAGNOSIS — E03.9 HYPOTHYROIDISM, UNSPECIFIED TYPE: ICD-10-CM

## 2025-03-26 DIAGNOSIS — D47.9 LYMPHOPROLIFERATIVE DISORDER (HCC): ICD-10-CM

## 2025-03-26 DIAGNOSIS — E61.1 IRON DEFICIENCY: ICD-10-CM

## 2025-03-26 DIAGNOSIS — C84.00 MYCOSIS FUNGOIDES, UNSPECIFIED BODY REGION (HCC): ICD-10-CM

## 2025-03-26 DIAGNOSIS — E11.9 TYPE 2 DIABETES MELLITUS WITHOUT COMPLICATION, WITHOUT LONG-TERM CURRENT USE OF INSULIN: Primary | ICD-10-CM

## 2025-03-26 DIAGNOSIS — C88.90: ICD-10-CM

## 2025-03-26 DIAGNOSIS — I10 ESSENTIAL HYPERTENSION: ICD-10-CM

## 2025-03-26 LAB — HBA1C MFR BLD: 6.2 %

## 2025-03-26 PROCEDURE — 1123F ACP DISCUSS/DSCN MKR DOCD: CPT | Performed by: INTERNAL MEDICINE

## 2025-03-26 PROCEDURE — 99214 OFFICE O/P EST MOD 30 MIN: CPT | Performed by: INTERNAL MEDICINE

## 2025-03-26 PROCEDURE — 3044F HG A1C LEVEL LT 7.0%: CPT | Performed by: INTERNAL MEDICINE

## 2025-03-26 PROCEDURE — 3074F SYST BP LT 130 MM HG: CPT | Performed by: INTERNAL MEDICINE

## 2025-03-26 PROCEDURE — 1159F MED LIST DOCD IN RCRD: CPT | Performed by: INTERNAL MEDICINE

## 2025-03-26 PROCEDURE — 3078F DIAST BP <80 MM HG: CPT | Performed by: INTERNAL MEDICINE

## 2025-03-26 PROCEDURE — 83036 HEMOGLOBIN GLYCOSYLATED A1C: CPT | Performed by: INTERNAL MEDICINE

## 2025-03-26 RX ORDER — OMEPRAZOLE 40 MG/1
40 CAPSULE, DELAYED RELEASE ORAL DAILY
Qty: 90 CAPSULE | Refills: 2 | Status: SHIPPED | OUTPATIENT
Start: 2025-03-26

## 2025-03-26 SDOH — ECONOMIC STABILITY: FOOD INSECURITY: WITHIN THE PAST 12 MONTHS, THE FOOD YOU BOUGHT JUST DIDN'T LAST AND YOU DIDN'T HAVE MONEY TO GET MORE.: PATIENT DECLINED

## 2025-03-26 SDOH — ECONOMIC STABILITY: FOOD INSECURITY: WITHIN THE PAST 12 MONTHS, YOU WORRIED THAT YOUR FOOD WOULD RUN OUT BEFORE YOU GOT MONEY TO BUY MORE.: PATIENT DECLINED

## 2025-03-26 ASSESSMENT — PATIENT HEALTH QUESTIONNAIRE - PHQ9
2. FEELING DOWN, DEPRESSED OR HOPELESS: NOT AT ALL
SUM OF ALL RESPONSES TO PHQ QUESTIONS 1-9: 0
1. LITTLE INTEREST OR PLEASURE IN DOING THINGS: NOT AT ALL
SUM OF ALL RESPONSES TO PHQ QUESTIONS 1-9: 0

## 2025-03-26 NOTE — PROGRESS NOTES
[facility]  [place] [address] [facility] [address]  Dept: [phone]  Dept Fax: [phone]     Name: Sonido Willard  : 1950           Chief Complaint   Patient presents with    Established New Doctor       History of Present Illness:    JELANI Head is here for follow-up,  Was seeing Dr. Bowers in the past,  Has underlying history of high blood pressure,  T-cell lymphoma seen oncology yesterday, continue methotrexate,  All 3 cell lines are normal WBC, red blood cells and platelets all within normal limits,  Doing otherwise well,  Blood pressure running well,  Sugars running well at home, will check the A1c today in the office,  On the screening, colonoscopy is due in ,  Annual wellness visit will be due in September,  PSA was done in  was normal,  Does not drink alcohol does not smoke,  AAA screening was completed in  hepatitis C screening was done in  up to date on his other screenings,  Continue the same medicine regimen  Past Medical History:    Past Medical History:   Diagnosis Date    Arthritis     BPH (benign prostatic hyperplasia)     Cancer (HCC)     lymphoma- on Methotrexate    Colon polyps 2017    colonoscopy at [place]    Diabetes mellitus (HCC)     History of colon polyps , 2017    Hypothyroidism 2012    Iron deficiency     Neutropenia     Osteoarthritis     Prediabetes     has lost weight    Prominent ileocecal valve 2017    Unspecified essential hypertension 2012      Reviewed all health maintenance requirements and ordered appropriate tests  Health Maintenance Due   Topic Date Due    Diabetic retinal exam  Never done    Shingles vaccine (1 of 2) Never done    Respiratory Syncytial Virus (RSV) Pregnant or age 60 yrs+ (1 - Risk 60-74 years 1-dose series) Never done    Diabetic foot exam  2023    Flu vaccine (1) 2024    COVID-19 Vaccine ( season) 2024    Annual Wellness Visit (Medicare

## 2025-04-02 DIAGNOSIS — N52.9 ERECTILE DYSFUNCTION, UNSPECIFIED ERECTILE DYSFUNCTION TYPE: ICD-10-CM

## 2025-04-02 RX ORDER — SILDENAFIL 50 MG/1
50 TABLET, FILM COATED ORAL PRN
Qty: 6 TABLET | Refills: 0 | Status: SHIPPED | OUTPATIENT
Start: 2025-04-02

## 2025-04-18 ENCOUNTER — TELEPHONE (OUTPATIENT)
Dept: FAMILY MEDICINE CLINIC | Age: 75
End: 2025-04-18

## 2025-04-18 NOTE — H&P
HISTORY and [address]       NAME:  Taniya Garcia  MRN: [id number]   YOB: 1950   Date: 11/1/2023   Age: 68 y.o. Gender: male       COMPLAINT AND PRESENT HISTORY:     Taniya Garcia is 68 y.o.,  male, presents for COLONOSCOPY DIAGNOSTIC   Primary dx: History of colon polyps [Z86.010]. HPI:  Taniya Garcia is 68 y.o.,   male, having a Diagnostic Colonoscopy. Prior Colonoscopy was done 2020     Patient has hx of Colon Polyps, no  hx of Diverticulosis. Patient denies any  FH of Colon Cancer. Patient reports no changes in bowel habits. No GI /Rectal bleeding, experiencing red/ black/ BRBPR stools. Patient has a history of abd. pain , no N/V, no abdominal bloating, no weight loss. Patient denies any Dysphagia. Pt has no hx of GERD. Pt denies fever/chills, chest pain or SOB     Review of additional significant medical hx:  (See chart for additional detail, including current medications /see ROS for current S/S):   HTN, HLD  Current medication r/t condition :  hydrochlorothiazide, Lipitor    BP Readings from Last 3 Encounters:   10/10/23 (!) 137/91   09/13/23 122/78   08/10/23 (!) 163/83       NPO status: pt NPO since the past midnight   Medications taken TODAY (with sip of water): pt took his BP medication today with sip of water  Anticoagulation status: none  Prep fully completed: YES. Pt reports his last BM is liquid   Denies personal hx of blood clots. Denies personal hx of MRSA infection. Denies any personal or family hx of previous complications w/anesthesia.     PAST MEDICAL HISTORY     Past Medical History:   Diagnosis Date    Arthritis     Cancer [facility])     leukemia    Colon poly[place] 07/03/2017    [place] [facility]    History of colon polyps 2014, 2017    Hypothyroidism 11/2/2012    Iron deficiency     Neutropenia (HCC)     Osteoarthritis     Prediabetes     has lost weight    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    Unspecified essential hypertension ear normal.      Nose: Nose normal.      Mouth/Throat:      Mouth: Mucous membranes are dry. Eyes:      General:         Right eye: No discharge. Left eye: No discharge. Cardiovascular:      Rate and Rhythm: Normal rate and regular rhythm. Pulses: Normal pulses. Heart sounds: Normal heart sounds. No murmur heard. Pulmonary:      Effort: Pulmonary effort is normal.      Breath sounds: Normal breath sounds. No wheezing or rhonchi. Abdominal:      General: Bowel sounds are normal. There is no distension. Palpations: Abdomen is soft. Tenderness: There is no abdominal tenderness. Hernia: No hernia is present. Musculoskeletal:         General: Normal range of motion. Cervical back: Normal range of motion and neck supple. Right lower leg: No edema. Left lower leg: No edema. Skin:     General: Skin is warm and dry. Findings: No bruising or erythema. Neurological:      General: No focal deficit present. Mental Status: He is alert and oriented to person, place, and time. Motor: No weakness.       Gait: Gait normal.   Psychiatric:         Mood and Affect: Mood normal.         Behavior: Behavior normal.       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[address]) 09/06/2018    Lymphoid hyperplasia 08/17/2018    Malignant immunoproliferative disease ([address])  08/17/2018    Bone marrow hyperplasia 08/17/2018    Pharyngitis 07/05/2018    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    Colon polyps 07/03/2017    Type 2 diabetes mellitus without complication, without long-term current use of insulin ([address]) 06/12/2017    Iron deficiency 05/14/2014    Neutropenia ([address]) 02/12/2014    History of colon polyps 01/01/2014    Essential hypertension 11/02/2012    Hypothyroidism 11/02/2012 4 = No assist / stand by assistance

## 2025-04-25 RX ORDER — ATORVASTATIN CALCIUM 10 MG/1
10 TABLET, FILM COATED ORAL DAILY
Qty: 90 TABLET | Refills: 3 | Status: SHIPPED | OUTPATIENT
Start: 2025-04-25

## 2025-04-30 RX ORDER — ATORVASTATIN CALCIUM 10 MG/1
10 TABLET, FILM COATED ORAL DAILY
Qty: 90 TABLET | Refills: 3 | Status: SHIPPED | OUTPATIENT
Start: 2025-04-30

## 2025-05-23 DIAGNOSIS — N52.9 ERECTILE DYSFUNCTION, UNSPECIFIED ERECTILE DYSFUNCTION TYPE: ICD-10-CM

## 2025-05-23 RX ORDER — SILDENAFIL 50 MG/1
50 TABLET, FILM COATED ORAL PRN
Qty: 6 TABLET | Refills: 0 | Status: SHIPPED | OUTPATIENT
Start: 2025-05-23

## 2025-05-28 ENCOUNTER — TELEPHONE (OUTPATIENT)
Dept: INTERNAL MEDICINE CLINIC | Age: 75
End: 2025-05-28

## 2025-05-28 NOTE — TELEPHONE ENCOUNTER
----- Message from Yazan I sent at 5/28/2025 12:28 PM EDT -----  Regarding: ECC Message to Provider  ECC Message to Provider    Relationship to Patient: Self     Additional Information Patient's want to know if he can also do his Annual wellness visit on his appointment on 7/29/2025 with Fani Vaughn MD  --------------------------------------------------------------------------------------------------------------------------    Call Back Information: Do not leave any message, patient will call back for answer  Preferred Call Back Number: Phone [phone]-[facility] home phone

## 2025-06-04 NOTE — TELEPHONE ENCOUNTER
Lov 4/24/23    Has next apt with Dr. Ayleen Hinkle on 9/26/23    Dr. Ayleen Hinkle is on [facility] please advise
sudden onset

## 2025-07-07 DIAGNOSIS — N52.9 ERECTILE DYSFUNCTION, UNSPECIFIED ERECTILE DYSFUNCTION TYPE: ICD-10-CM

## 2025-07-07 RX ORDER — SILDENAFIL 50 MG/1
50 TABLET, FILM COATED ORAL PRN
Qty: 6 TABLET | Refills: 0 | Status: SHIPPED | OUTPATIENT
Start: 2025-07-07

## 2025-07-28 ENCOUNTER — OFFICE VISIT (OUTPATIENT)
Dept: INTERNAL MEDICINE CLINIC | Age: 75
End: 2025-07-28
Payer: COMMERCIAL

## 2025-07-28 VITALS
WEIGHT: 184.6 LBS | HEIGHT: 65 IN | DIASTOLIC BLOOD PRESSURE: 72 MMHG | BODY MASS INDEX: 30.75 KG/M2 | SYSTOLIC BLOOD PRESSURE: 124 MMHG | HEART RATE: 94 BPM | OXYGEN SATURATION: 96 %

## 2025-07-28 DIAGNOSIS — E03.9 HYPOTHYROIDISM, UNSPECIFIED TYPE: Primary | ICD-10-CM

## 2025-07-28 DIAGNOSIS — E11.9 TYPE 2 DIABETES MELLITUS WITHOUT COMPLICATION, WITHOUT LONG-TERM CURRENT USE OF INSULIN (HCC): ICD-10-CM

## 2025-07-28 LAB — HBA1C MFR BLD: 6.3 %

## 2025-07-28 PROCEDURE — 1159F MED LIST DOCD IN RCRD: CPT | Performed by: STUDENT IN AN ORGANIZED HEALTH CARE EDUCATION/TRAINING PROGRAM

## 2025-07-28 PROCEDURE — 3044F HG A1C LEVEL LT 7.0%: CPT | Performed by: STUDENT IN AN ORGANIZED HEALTH CARE EDUCATION/TRAINING PROGRAM

## 2025-07-28 PROCEDURE — 83036 HEMOGLOBIN GLYCOSYLATED A1C: CPT | Performed by: STUDENT IN AN ORGANIZED HEALTH CARE EDUCATION/TRAINING PROGRAM

## 2025-07-28 PROCEDURE — 1123F ACP DISCUSS/DSCN MKR DOCD: CPT | Performed by: STUDENT IN AN ORGANIZED HEALTH CARE EDUCATION/TRAINING PROGRAM

## 2025-07-28 PROCEDURE — 3074F SYST BP LT 130 MM HG: CPT | Performed by: STUDENT IN AN ORGANIZED HEALTH CARE EDUCATION/TRAINING PROGRAM

## 2025-07-28 PROCEDURE — 3078F DIAST BP <80 MM HG: CPT | Performed by: STUDENT IN AN ORGANIZED HEALTH CARE EDUCATION/TRAINING PROGRAM

## 2025-07-28 PROCEDURE — 1160F RVW MEDS BY RX/DR IN RCRD: CPT | Performed by: STUDENT IN AN ORGANIZED HEALTH CARE EDUCATION/TRAINING PROGRAM

## 2025-07-28 PROCEDURE — 99213 OFFICE O/P EST LOW 20 MIN: CPT | Performed by: STUDENT IN AN ORGANIZED HEALTH CARE EDUCATION/TRAINING PROGRAM

## 2025-07-28 ASSESSMENT — ENCOUNTER SYMPTOMS
DIARRHEA: 0
CONSTIPATION: 0
ABDOMINAL PAIN: 0
SHORTNESS OF BREATH: 0
COUGH: 0
WHEEZING: 0

## 2025-07-28 NOTE — PROGRESS NOTES
[facility]  [address] [address]  Dept: [phone]    [facility]    Progress Note    Date of patient's visit: 7/28/2025    Patient's Name:  Sonido Willard    YOB: 1950            Patient Care Team:  Fani Vaughn MD as PCP - General (Internal Medicine)  Jimmy Nair MD as PCP - Internal Medicine (Otolaryngology)  [facility]  Kvng Mccauley MD as Consulting Physician (Gastroenterology)  Ignacio Narayanan MD as Consulting Physician (Hematology and Oncology)  Nabila Diaz RN as Registered Nurse (Oncology)    REASON FOR VISIT:     Chief Complaint   Patient presents with    Diabetes         HISTORY OF PRESENT ILLNESS:    History was obtained from the patient. Sonido Willard is a 74 y.o. is here for     Diabetes mellitus  Hemoglobin A1c 6.3 from 6.2.  Compliant with metformin 500 mg  He has been eating sweets/carbs lately.  Will decrease the amount.    Hypothyroidism  Compliant with his Synthroid however, he does not take it on an empty stomach.  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

## 2025-07-28 NOTE — PROGRESS NOTES
Have you been to the ER, urgent care clinic since your last visit?  Hospitalized since your last visit?   NO    Have you seen or consulted any other health care providers outside our system since your last visit?   NO      “Have you had a diabetic eye exam?”    NO     No diabetic eye exam on file

## 2025-08-07 DIAGNOSIS — N52.9 ERECTILE DYSFUNCTION, UNSPECIFIED ERECTILE DYSFUNCTION TYPE: ICD-10-CM

## 2025-08-07 RX ORDER — SILDENAFIL 50 MG/1
50 TABLET, FILM COATED ORAL PRN
Qty: 6 TABLET | Refills: 0 | Status: SHIPPED | OUTPATIENT
Start: 2025-08-07

## 2025-09-05 ENCOUNTER — PATIENT MESSAGE (OUTPATIENT)
Dept: INTERNAL MEDICINE CLINIC | Age: 75
End: 2025-09-05

## (undated) DEVICE — SUTURE SUTTAPE FIBERLINK 1.3MM WHT BLU CLS LOOP AR7535

## (undated) DEVICE — DEFENDO AIR WATER SUCTION AND BIOPSY VALVE KIT FOR  OLYMPUS: Brand: DEFENDO AIR/WATER/SUCTION AND BIOPSY VALVE

## (undated) DEVICE — COVER,TABLE,HEAVY DUTY,50"X90",STRL: Brand: MEDLINE

## (undated) DEVICE — GOWN,SIRUS,POLYRNF,BRTHSLV,XL,30/CS: Brand: MEDLINE

## (undated) DEVICE — 3M™ IOBAN™ 2 ANTIMICROBIAL INCISE DRAPE 6651EZ: Brand: IOBAN™ 2

## (undated) DEVICE — GAUZE,SPONGE,FLUFF,6"X6.75",STRL,5/TRAY: Brand: MEDLINE

## (undated) DEVICE — POLYP TRAP: Brand: TRAPEASE®

## (undated) DEVICE — MARKER,SKIN,WI/RULER AND LABELS: Brand: MEDLINE

## (undated) DEVICE — DRAPE,EXTREMITY,89X128,STERILE: Brand: MEDLINE

## (undated) DEVICE — DRAPE,U/ SHT,SPLIT,PLAS,STERIL: Brand: MEDLINE

## (undated) DEVICE — 450 ML BOTTLE OF 0.05% CHLORHEXIDINE GLUCONATE IN 99.95% STERILE WATER FOR IRRIGATION, USP AND APPLICATOR.: Brand: IRRISEPT ANTIMICROBIAL WOUND LAVAGE

## (undated) DEVICE — SUTURE VCRL SZ 0 L36IN ABSRB UD CT-1 L36MM 1/2 CIR TAPR PNT VCP946H

## (undated) DEVICE — FORCEPS BX L240CM WRK CHN 2.8MM STD CAP W/ NDL MIC MESH

## (undated) DEVICE — SUTURE ETHBND EXCEL SZ 1 L30IN NONABSORBABLE GRN L36MM CT-1 X425H

## (undated) DEVICE — SUTURE ETHBND EXCEL SZ 2 L30IN NONABSORBABLE GRN L40MM V-37 MX69G

## (undated) DEVICE — GLOVE ORANGE PI 7   MSG9070

## (undated) DEVICE — SNARE ENDOSCP AD SM L240CM LOOP W1.3CM SHTH DIA2.4MM POLYP

## (undated) DEVICE — BLANKET WRM W40.2XL55.9IN IORT LO BODY + MISTRAL AIR

## (undated) DEVICE — ERBE NESSY® OMEGA PLATE USA (85+23)CM² , WITH CABLE 3 M: Brand: ERBE

## (undated) DEVICE — SUTURE ETHBND EXCEL SZ 5 L30IN NONABSORBABLE GRN L48MM V-40 MB46G

## (undated) DEVICE — DRESSING TRNSPAR W5XL4.5IN FLM SHT SEMIPERMEABLE WIND

## (undated) DEVICE — ENDO KIT W/SYRINGE: Brand: MEDLINE INDUSTRIES, INC.

## (undated) DEVICE — SOLUTION IRRIG 1000ML STRL H2O USP PLAS POUR BTL

## (undated) DEVICE — TOWEL,OR,DSP,ST,NATURAL,DLX,4/PK,20PK/CS: Brand: MEDLINE

## (undated) DEVICE — SUTURE VCRL + SZ 3-0 L36IN ABSRB UD L36MM CT-1 1/2 CIR VCP944H

## (undated) DEVICE — ROUND DIAMOND

## (undated) DEVICE — 3M™ STERI-DRAPE™ U-DRAPE 1015: Brand: STERI-DRAPE™

## (undated) DEVICE — SUTURE FIBERWIRE SZ 2 L38IN NONABSORBABLE BLU L36.6MM 1/2 AR7202

## (undated) DEVICE — 1010 S-DRAPE TOWEL DRAPE 10/BX: Brand: STERI-DRAPE™

## (undated) DEVICE — GLOVE ORANGE PI 8   MSG9080

## (undated) DEVICE — GLOVE ORANGE PI 7 1/2   MSG9075

## (undated) DEVICE — COVER,MAYO STAND,STERILE: Brand: MEDLINE

## (undated) DEVICE — STOCKINETTE,IMPERVIOUS,12X48,STERILE: Brand: MEDLINE

## (undated) DEVICE — MHPB GEN MINOR PACK: Brand: MEDLINE INDUSTRIES, INC.

## (undated) DEVICE — SUTURE FIBERTAPE SZ 2-0 L36IN NONABSORBABLE BLU 2MM EA END AR7237

## (undated) DEVICE — ELECTRODE ES L3IN S STL BLDE INSUL DISP VALLEYLAB EDGE

## (undated) DEVICE — GLOVE SURG SZ 8 L12IN THK75MIL DK GRN LTX FREE

## (undated) DEVICE — SUTURE PROL SZ 3-0 L18IN NONABSORBABLE BLU L30MM FS-1 3/8 8663G

## (undated) DEVICE — SOLUTION IRRIG 1000ML 0.9% SOD CHL USP POUR PLAS BTL

## (undated) DEVICE — CEMENT MIXING SYSTEM WITH FEMORAL BREAKWAY NOZZLE: Brand: REVOLUTION

## (undated) DEVICE — SHEET, ORTHO, SPLIT, STERILE: Brand: MEDLINE

## (undated) DEVICE — SUTURE TIGERTAPE TIGERWIRE SZ 2-0 L30IN NONABSORBABLE AR72377T

## (undated) DEVICE — SOLUTION IRRIG 3000ML 0.9% SOD CHL USP UROMATIC PLAS CONT

## (undated) DEVICE — HANDPIECE SET WITH COAXIAL HIGH FLOW TIP AND SUCTION TUBE: Brand: INTERPULSE

## (undated) DEVICE — DRESSING,GAUZE,XEROFORM,CURAD,1"X8",ST: Brand: CURAD

## (undated) DEVICE — SNARE ENDOSCP L240CM W15MM SHTH DIA2.4MM CHN 2.8MM STIFF

## (undated) DEVICE — DRAPE,REIN 53X77,STERILE: Brand: MEDLINE

## (undated) DEVICE — BANDAGE COBAN 4 IN COMPR W4INXL5YD FOAM COHESIVE QUIK STK SELF ADH SFT

## (undated) DEVICE — STRAP,POSITIONING,KNEE/BODY,FOAM,4X60": Brand: MEDLINE

## (undated) DEVICE — DUAL CUT SAGITTAL BLADE

## (undated) DEVICE — APPLICATOR MEDICATED 26 CC SOLUTION HI LT ORNG CHLORAPREP

## (undated) DEVICE — 1016 S-DRAPE IRRIG POUCH 10/BOX: Brand: STERI-DRAPE™

## (undated) DEVICE — STRIP,CLOSURE,WOUND,MEDI-STRIP,1/2X4: Brand: MEDLINE